# Patient Record
Sex: FEMALE | Race: WHITE | NOT HISPANIC OR LATINO | Employment: FULL TIME | URBAN - METROPOLITAN AREA
[De-identification: names, ages, dates, MRNs, and addresses within clinical notes are randomized per-mention and may not be internally consistent; named-entity substitution may affect disease eponyms.]

---

## 2019-01-18 ENCOUNTER — CLINICAL SUPPORT (OUTPATIENT)
Dept: URGENT CARE | Facility: CLINIC | Age: 49
End: 2019-01-18

## 2019-01-18 DIAGNOSIS — Z02.83 ENCOUNTER FOR DRUG SCREENING: Primary | ICD-10-CM

## 2019-01-18 PROCEDURE — 80305 PR COLLECTION ONLY DRUG SCREEN: ICD-10-PCS | Mod: S$GLB,,, | Performed by: EMERGENCY MEDICINE

## 2019-01-18 PROCEDURE — 80305 DRUG TEST PRSMV DIR OPT OBS: CPT | Mod: S$GLB,,, | Performed by: EMERGENCY MEDICINE

## 2019-01-25 ENCOUNTER — HOSPITAL ENCOUNTER (OUTPATIENT)
Dept: PSYCHIATRY | Facility: HOSPITAL | Age: 49
Discharge: HOME | End: 2019-01-25
Attending: PSYCHIATRY & NEUROLOGY
Payer: COMMERCIAL

## 2019-01-25 VITALS
DIASTOLIC BLOOD PRESSURE: 84 MMHG | BODY MASS INDEX: 31.34 KG/M2 | HEIGHT: 65 IN | WEIGHT: 188.13 LBS | TEMPERATURE: 98 F | HEART RATE: 88 BPM | SYSTOLIC BLOOD PRESSURE: 127 MMHG | RESPIRATION RATE: 16 BRPM

## 2019-01-25 DIAGNOSIS — F10.20 ALCOHOL USE DISORDER, SEVERE, DEPENDENCE: Primary | ICD-10-CM

## 2019-01-25 LAB
AMPHET+METHAMPHET UR QL: NEGATIVE
B-HCG UR QL: NEGATIVE
BARBITURATES UR QL SCN>200 NG/ML: NEGATIVE
BENZODIAZ UR QL SCN>200 NG/ML: NEGATIVE
BREATH ALCOHOL: 0
BZE UR QL SCN: NEGATIVE
CANNABINOIDS UR QL SCN: NEGATIVE
CREAT UR-MCNC: 38 MG/DL
ETHANOL UR-MCNC: <10 MG/DL
METHADONE UR QL SCN>300 NG/ML: NEGATIVE
OPIATES UR QL SCN: NEGATIVE
PCP UR QL SCN>25 NG/ML: NEGATIVE
TOXICOLOGY INFORMATION: NORMAL

## 2019-01-25 PROCEDURE — 80307 DRUG TEST PRSMV CHEM ANLYZR: CPT

## 2019-01-25 PROCEDURE — 99222 1ST HOSP IP/OBS MODERATE 55: CPT | Mod: ,,, | Performed by: PSYCHIATRY & NEUROLOGY

## 2019-01-25 PROCEDURE — 81025 URINE PREGNANCY TEST: CPT

## 2019-01-25 PROCEDURE — 90853 PR GROUP PSYCHOTHERAPY: ICD-10-PCS | Mod: ,,, | Performed by: PSYCHOLOGIST

## 2019-01-25 PROCEDURE — 90853 GROUP PSYCHOTHERAPY: CPT | Mod: ,,, | Performed by: PSYCHOLOGIST

## 2019-01-25 PROCEDURE — 90853 GROUP PSYCHOTHERAPY: CPT | Mod: 59,,, | Performed by: PSYCHOLOGIST

## 2019-01-25 PROCEDURE — 90853 PR GROUP PSYCHOTHERAPY: ICD-10-PCS | Mod: 59,,, | Performed by: PSYCHOLOGIST

## 2019-01-25 PROCEDURE — 90853 GROUP PSYCHOTHERAPY: CPT

## 2019-01-25 PROCEDURE — 90853 GROUP PSYCHOTHERAPY: CPT | Performed by: SOCIAL WORKER

## 2019-01-25 PROCEDURE — 99222 PR INITIAL HOSPITAL CARE,LEVL II: ICD-10-PCS | Mod: ,,, | Performed by: PSYCHIATRY & NEUROLOGY

## 2019-01-25 NOTE — PROGRESS NOTES
Group Psychotherapy (PhD/LCSW)    Site: [facility]    Clinical status of patient: Intensive Outpatient Program (IOP)    Date: 1/25/2019    Group Focus: Stress Management    Length of service: 31683 - 45-50 minutes    Number of patients in attendance: 10    Referred by: Addictive Behavior Unit Treatment Team    Target symptoms: Alcohol Abuse    Patient's response to treatment: Active Listening    Progress toward goals: Progressing adequately    Interval History: Group learned mindfulness techniques (breath, senses) to improve present-moment awareness, impulsive behavior tendencies, and tolerance of various emotional states.    Diagnosis: Alcohol Use Disorder    Plan: Continue treatment on ABU

## 2019-01-25 NOTE — PROGRESS NOTES
Group Psychotherapy (PhD/LCSW)    Site: [facility]    Clinical status of patient: Intensive Outpatient Program (IOP)    Date: 1/25/2019    Group Focus: The Dynamics of Relationship       Length of service: 38677 - 45-50 minutes    Number of patients in attendance: 6    Referred by: Addictive Behavior Unit Treatment Team    Target symptoms: Alcohol Abuse    Patient's response to treatment: Active Listening; Self-disclosure    Progress toward goals: Progressing adequately    Interval History: Discussed communication strategies for rebuilding trust with significant others.       Diagnosis: Alcohol Use Disorder, severe dependence     Plan: Continue treatment on ABU

## 2019-01-25 NOTE — PROGRESS NOTES
Group Psychotherapy (PhD/LCSW)    Site: [facility]    Clinical status of patient: Intensive Outpatient Program (IOP)    Date: 1/25/2019    Group Focus: Psychodynamic Group Therapy      Length of service: 80873 - 45-50 minutes    Number of patients in attendance: 6    Referred by: Addictive Behavior Unit Treatment Team    Target symptoms: Alcohol Abuse    Patient's response to treatment: Active Listening; Self-disclosure    Progress toward goals: Progressing adequately    Interval History: First day on the ABU program. Pt shared her story with the group.     Diagnosis: Alcohol Use Disorder, severe dependence     Plan: Continue treatment on ABU

## 2019-01-25 NOTE — PLAN OF CARE
01/25/19 1000   Activity/Group Therapy Checklist   Group Relapse Prevention   Attendance Attended   Follows Direction Followed directions   Group Interactions/Observations Interacted appropriately   Affect/Mood Range Normal range   Affect/Mood Display Appropriate   Goal Progression Progressing

## 2019-01-25 NOTE — H&P
1/25/2019 9:02 AM  Glo Flores  1970  [id number]    Addictive Behavior Unit Intensive Outpatient Program Admission History & Physical    STATUS:  Intensive Outpatient Program (IOP)    SUBJECTIVE:     History of Present Illness:   Glo Flores is a 48 y.o. female with a past psychiatric history of alcohol use, who presented to [facility] IOP due to same.     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 States that from age [age] had a partner that didn't drink etoh and neither did she, then their relationship ended. Mentions she was isolated during that time in her life from others. After that relationship ended, she made more social connections, developed a close group of friends, and her support system improved. She also notes that she began to have a glass of wine every so often, started going out with friends.     Prior to 2015, didn't drink except socially from age [age], about 3-4 glasses of wine a week, slowly increased over time.  At work in 2015, she experienced stress due to change in her supervisor. She felt her work performance remained the same, though in contrast to assessments with previous supervisor, she received negative evaluations. This was a large source of dissatisfaction and distress for her in the workplace.   Went on a cruise with her friends, met her current partner who drank heavily; per patient he has a problem with alcohol as well and has since stopped drinking.   Reports these two events correlated with when she started drinking more. 2 bottles of wine per night, though then noticed she would drink whatever was available.     She notes she began to experience dysfunction during that time, and friends told her she needed to stop drinking. Things came to a head when she was demoted from nursing educator in 2/2018; she was offered staff nurse position or severance package, went down to 32 hours a week but got a raise. Though states that she  "was "too far in" and started reporting to another supervisor.     End of July 2018 she was suspended from work due to issues with penmanship (due to tremulousness), missed work time and attendance; that day reported herself to the board of nursing. End of September went for her evaluation at [facility] addiction center and was recommended residential treatment. Started going to AA on [address] since beginning of September, then went to rehab 11/19/19.   Was able to stop drinking during interval from hospitalization to rehab admission by going to AA daily.     Was treated in the hospital with DTs and hypokalemia in 8/2018 when she tried to stop drinking, though had alcohol withdrawal, so presented to hospital tremulousness. Last drink 8/30/2018, was in hospital on morning of admission. Talks about being under PEC at the hospital, has vague memories of that time. Parents visited with her then, she reports her family is supportive.     Talks about a higher power during interview, and friends who offered her help; these are sources of strength.     Remarks that friends noticed that she was drinking more, isolating, tried to tell her to stop but she wouldn't listen. Impacted her worse performance, if she didn't feel like going she wouldn't. Wouldn't drink before work, though did call in sick once when she was intoxicated and went back to sleep. Had previously tried to stop drinking, though with withdrawal symptoms she would try to cut down and ended up drinking more again.     Did pay cash for rehab though not financial stressed presently.     Patient reports she completed program at [facility], believes she benefited from it greatly. Her partner stopped drinking the same day that she did, when she entered the hospital for withdrawal. He is also attending AA, though didn't go to rehab and doesn't yet have a sponsor; he is supportive.     Patient reports she experienced feeling anhedonic in the past, though this has " since resolved; she is on treatment with prozac that her ob/gyn started when she had perimenopausal symptoms and reports it has been well tolerated. Additionally started trazodone during rehab stay, helped her sleep well. Admits that she has dealt with worries about the next day, particularly when trying to sleep and some social anxiety though doesn't experience debilitating symptoms.       Substance Abuse History:  Substance of Choice: alcohol   Substances Used: none  History of IVDU?: No  Use of Alcohol: Yes - heavy  Average Consumption: 2 bottles of wine / day   Last Drink: 8/30/18  Tobacco: Yes - 1/2 PPD  History of Withdrawals: Yes - DTs in past 8/2018   History of Detox: Yes - in hospital 8/2018  Rehab History: [facility] in [place] re[facility], completed program until 1/16; 60 days   AA/NA: Has been attending daily, had in house and outside meetings at [facility], has a sponsor now at    Spouse/Partner Consumption: Yes - partner has the same sobriety date, they removed all alcohol from house and started going to meetings, though he doesn't have a sponsor yet   Patient Aware of Biomedical Complications: Yes    DSM-5 Substance Use Disorder Criteria:  1. Often take in larger amounts or over a longer period of time than was intended: Yes  2. Persistent desire or unsuccessful efforts to cut down or control use: Yes  3. Great deal of time spent in activities necessary to obtain substance, use, or recover from effects: Yes  4. Craving/strong desire for substance or urge to use: Yes  5. Use resulting in failure to fulfill major role obligations at home, work or school: Yes  6. Social, occupational, recreational activities decreased because of use: Yes  7. Continued use despite having persistent or recurrent social or interpersonal problems cause or exaserbated by the substance: Yes  8. Recurrent use in situations in which it is physically hazardous: Yes  9. Use despite physical or psychological problems that are  "likely to have been caused or exacerbated by the substance: Yes  10. Tolerance, as defined by either of the following: Yes   A. A need for markedly increased amounts of substance to achieve intoxication or desired effect. -OR-    B. A markedly diminished effect with continued use of the same amount of substance.  11. Withdrawal, as manifested by the following: Yes   A. The characteristic withdrawal syndrome for substance. -AND-   B. Substance is taken to relieve or avoid withdrawal symptoms.  Mild (1-3), Moderate (4-5), Severe (?6)    Psychiatric History:  Diagnoses: Depression, anxiety   Previous Medication Trials: yes, prozac 20mg po qdaily (3 years, prescribed by ob/gyn initially for perimenopausal symptoms ) and cymbalta (felt "loopy")  Previous Psychiatric Hospitalizations: no   Previous Suicide Attempts: no   History of Violence: no  Outpatient Psychiatrist: no  Had a therapist at [facility] in past. Had seen a therapist in past when she and her partner broke up, didn't see them for long, felt like she couldn't let relationship go and it helped somewhat.     Social History:  Marital Status: living with male partner   Children: 0    Employment Status: was on medical leave, that , now on short term disability, will bring paper work for long term disability   Education: college graduate  Special Ed: no   History: no  Housing Status: living with partner   Financial Status: secure, reports she has savings   Leisure/Recreation: just learned to M[employer]   Childhood History: parents alive, reports she had a good childhood, describes herself as an obedient child, good grades; one younger brother; close with her family   History of Physical/Sexual Abuse: no  Access to Gun: yes, recommend removing it from the home     Legal History:  Past Charges/Incarcerations: no   Pending Charges: no     Family Psychiatric History:   Maternal grandfather had alcohol use disorder (and his four siblings)  Maternal uncle " "had alcohol use disorder, both sober now  Maternal cousin with suicide attempt, alcohol and polysubstance use     Psychiatric Review Of Systems:  sleep: yes  appetite: no  weight: yes, gained 20 lbs   energy/anergy: yes  interest/pleasure/anhedonia: no  somatic symptoms: no  guilty/hopelessness: no  concentration: no  S.I.B.s/risky behavior: no  SI/SA:  no    anxiety/panic: no  Agoraphobia:  no  Social phobia: at times, though finds it manageable, does not endorse dysfunction as a result   Recurrent nightmares:  no  hyper startle response:  no  Avoidance: no  Recurrent thoughts:  no  Recurrent behaviors:  no    Irritability: no  Racing thoughts: no  Impulsive behaviors: no  Pressured speech:  no    Paranoia:no  Delusions: no  AVH:no    Medical Review Of Systems:  Negative    Collateral:   None obtained     Allergies:  Patient has no known allergies.  Medical/Surgical History:  HTN  Perimenopause   Peripheral neuropathy     Rx:   Metoprolol 12.5mg po qdaily  Prozac 20mg po qdaily  Trazodone 50mg po qhs prn for insomnia  Junel FE birth control pills   Alpha lipoic acid (dietary supplement, 1 month)   MVN     No past medical history on file.  No past surgical history on file.  OBJECTIVE:     Current Medications:  Infusions:    Scheduled:    PRN:    Home Medications:  Prior to Admission medications    Not on File     Vital Signs:  Vitals:    01/25/19 0830   BP: 127/84   Pulse: 88   Resp: 16   Temp: 98.3 °F (36.8 °C)     Physical Exam:    Neuro: regular gait, station    Mental Status Exam:  Appearance: unremarkable, age appropriate, casual clothing   Level of Consciousness: awake   Behavior/Cooperation: friendly and cooperative  Psychomotor: unremarkable   Speech: normal tone, normal rate, normal pitch, normal volume  Language: english, fluid  Orientation: person, place, situation, time/date  Attention Span/Concentration: spelled "WORLD" forwards and backwards  Memory: Registers and recalls 3/3 objects at 0 and 5 " "minutes  Mood: "happy"  Affect: mood congruent, reactive  Thought Process: linear, normal and logical  Associations: normal and logical  Thought Content: normal, no suicidality, no homicidality, delusions, or paranoia  Fund of Knowledge: Aware of current events  Abstraction: proverbs were abstract  Insight: good  Judgment: good    Labs/Imaging/Studies:   No results found for this or any previous visit (from the past 24 hour(s)).   ASSESSMENT     Glo Flores is a 48 y.o. female with a past psychiatric history of alcohol use disorder, who presented to ABU IOP due to same.     Ms. Flores has had significant dysfunction from alcohol use disorder causing occupational, social, and interpersonal dysfunction and failed attempts to maintain sobriety and complex withdrawal syndrome who has now completed residential rehab and would benefit from continued care in a supportive setting.     IMPRESSION  Alcohol use disorder, severe, in early remission   Hx of depression, unspecified, in remission   Hx OCPD    PLAN     · Start patient on ABU protocol.  · Breathalyzer and urine toxicology daily.  · VS daily x 3 days.  · CBC, CMP, GGT; will review patient records prior to reordering as she reports recently had labs at rehab   · Patient counseled on abstinence from alcohol.    Medications: Continue current medications.  · Trazodone 50mg po qdaily (since 11/2018) off label for insomnia   · Prozac 20mg po qdaily off label for perimenopausal symptoms per ob/gyn   · Metoprolol 12.5mg po qdaily for HTN    Status: Continue treatment on ABU    Twila Zaman MD  PGY II Psychiatry     "

## 2019-01-28 ENCOUNTER — OCCUPATIONAL HEALTH (OUTPATIENT)
Dept: URGENT CARE | Facility: CLINIC | Age: 49
End: 2019-01-28

## 2019-01-28 ENCOUNTER — HOSPITAL ENCOUNTER (OUTPATIENT)
Dept: PSYCHIATRY | Facility: HOSPITAL | Age: 49
Discharge: HOME | End: 2019-01-28
Attending: PSYCHIATRY & NEUROLOGY
Payer: COMMERCIAL

## 2019-01-28 VITALS — DIASTOLIC BLOOD PRESSURE: 62 MMHG | RESPIRATION RATE: 16 BRPM | HEART RATE: 82 BPM | SYSTOLIC BLOOD PRESSURE: 124 MMHG

## 2019-01-28 DIAGNOSIS — F10.20 ALCOHOL USE DISORDER, SEVERE, DEPENDENCE: Primary | ICD-10-CM

## 2019-01-28 DIAGNOSIS — Z76.89 ENCOUNTER FOR ASSESSMENT OF ALCOHOL AND DRUG USE: Primary | ICD-10-CM

## 2019-01-28 LAB
AMPHET+METHAMPHET UR QL: NEGATIVE
BARBITURATES UR QL SCN>200 NG/ML: NEGATIVE
BENZODIAZ UR QL SCN>200 NG/ML: NEGATIVE
BREATH ALCOHOL: 0
BZE UR QL SCN: NEGATIVE
CANNABINOIDS UR QL SCN: NEGATIVE
CREAT UR-MCNC: 28 MG/DL
ETHANOL UR-MCNC: <10 MG/DL
METHADONE UR QL SCN>300 NG/ML: NEGATIVE
OPIATES UR QL SCN: NEGATIVE
PCP UR QL SCN>25 NG/ML: NEGATIVE
TOXICOLOGY INFORMATION: NORMAL

## 2019-01-28 PROCEDURE — 90853 PR GROUP PSYCHOTHERAPY: ICD-10-PCS | Mod: 59,,, | Performed by: PSYCHOLOGIST

## 2019-01-28 PROCEDURE — 80305 DRUG TEST PRSMV DIR OPT OBS: CPT | Mod: S$GLB,,, | Performed by: EMERGENCY MEDICINE

## 2019-01-28 PROCEDURE — 80305 PR COLLECTION ONLY DRUG SCREEN: ICD-10-PCS | Mod: S$GLB,,, | Performed by: EMERGENCY MEDICINE

## 2019-01-28 PROCEDURE — 90853 GROUP PSYCHOTHERAPY: CPT | Performed by: SOCIAL WORKER

## 2019-01-28 PROCEDURE — 90853 GROUP PSYCHOTHERAPY: CPT

## 2019-01-28 PROCEDURE — 80307 DRUG TEST PRSMV CHEM ANLYZR: CPT

## 2019-01-28 PROCEDURE — 90853 GROUP PSYCHOTHERAPY: CPT | Mod: 59,,, | Performed by: PSYCHOLOGIST

## 2019-01-28 NOTE — TREATMENT PLAN
[facility]  ADDICTIVE BEHAVIOR UNIT  INTERDISCIPLINARY TREATMENT PLAN  INTENSIVE OUTPATIENT PROGRAM    INTERDISCIPLINARY  TREATMENT TEAM:    Nadira Stone M.D., Psychiatrist     Kia Victoria, Ph.D., Clinical Psychologist    Della Davis R.N., Registered Nurse    Salbador Live, [facility],     Krist[facility], [facility],     Kai Valdez, [facility],     Resident: Dr. Zaman          Signatures scanned into record separately.      ESTIMATED LOS:  4-6 weeks        The patient has reviewed the treatment plan with staff and has signed the Patient Responsibilities form.  Patient signature scanned into record separately        Dr. Thee Herron certifies that the patient would require inpatient psychiatric care if the Partial Hospitalization services were not provided, and services will be furnished under the care of a physician, and under a written Plan of Treatment.    Thee Herron M.D., Psychiatrist - Signature scanned into record separately.    TREATMENT PLAN    DIAGNOSIS: Alcohol Use Disorder, severe, in early remission; Depression             Patient/Family Education Needs/Barriers to Learning (i.e., Language, Reading, Comprehension): None       Support/Advocacy Services/Needs (i.e., Financial, Transportation, Medications): None       Community Resources (i.e., Alcoholics Anonymous, Al Anon, Cocaine Anonymous, Narcotics Anonymous): None           Strengths:  1. Intelligent   2. Good at problem solving   3. Ability to reflect/meditate        4. Willing for treatment         Limitations:  1. Not asking for help   2. Coping with depression    3. Work stress   4. Risk for relapse           Goals and Objectives:  1. Goal:  Abstain from alcohol and illicit drugs   Objective measure: Negative breathalyzer, negative urine screens   Time frame to reach goal: By discharge    2  Goal: Attend daily 12-step meetings   Objective measure: Signed attendance sheet daily   Time frame  to reach goal: Each day    3. Goal: Participate in group sessions    Objective measure: Progress notes indicating active listening, self-disclosure,   feedback   Time frame to reach goal: Each day    4. Goal: Obtain a 12-step sponsor   Objective measure: self-report   Time frame to reach goal: By discharge    5. Goal: Complete Life Story   Objective measure: Share story with group   Time frame to reach goal: Within first two weeks of treatment    6. Goal: Complete First Step   Objective measure: Share 1st step with group   Time frame to reach goal:  By discharge    7. Goal: Complete Relapse Prevention Plan   Objective measure: Share plan with group   Time frame to reach goal: By discharge    8.  Goal: Family involvement/participation   Objective measure: Family session documented in progress notes   Time frame to reach goal: By discharge    9. Goal:  Reduce depression   Objective measure: Physician progress note indicating depression is improved   Time frame to reach goal: By discharge    10.  Goal: Address licensing board issues   Objective measure: Contact with licensing board   Time frame to reach goal: By discharge              Group Interventions:  Psychodynamic Group Psychotherapy  1 hour, five times per week  Goals: 1. Utilize group empathy and support for problem solving; 2. Apply stress management, communication, and assertive skills to personal issues; 3. Discuss negative consequences of addictive behavior; 4. Discuss ways to change lifestyle to support sobriety; 5. Discuss addiction history    Addiction Education Group  1 hour, 2 times per week  Goals:  1. Verbalize increased knowledge of the process of recovery; 2. Understand basic concepts of addiction (denial, powerlessness, unmanageabiltiy, etc.); 3. Develop a consistent, positive image of self    Steps to Recovery Group  1 hour, 1 time per week  Goals:  1. Learn 12 steps; 2. Identify ways to incorporate 12 step principles into daily life; 3. Complete  first step; 4. Verbalize knowledge and understanding of the concept of a higher power    Living Sober Group  1 hour, 2 times per week  Goals:  1.  Reflect upon events of day/weekend, focusing on positive change; 2.  Discuss dynamics of 12 step meetings attended; 3. Discuss topics from book Living Sober     Stress Management Skills Group  1 hour, 3 times per week  Goals: 1. Identify types and levels of stress; 2. Identify and change maladaptive beliefs and behaviors; 3. Identify and practice techniques of stress management    Disease Concept Group  1 hour, 1 time per week  Goals: 1. Verbalize an understanding of the disease concept of addiction; 2. Increase familys understanding of the disease concept of addiction    Communication Skills Group  1 hour, 2 times per week  Goals: 1. Learn rules of effective communication; 2. Improve listening skills; 3. Practice clear communication    Promoting Healthy Lifestyles Group  1 hour, 1 time per week  Goals:  1. Understand the biopsychosocial model of health; 2. Develop insight into how substance abuse/dependency can impact dimensions of health; 3. Develop appropriate health promotion strategies    Relationship Dynamics Group  1 hour, 1 time per week  Goals:  1. Learn about factors that shape relationships; 2. Understand the central role of relationships in personal well-being; 3. Learn how to improve all relationships    Medical Complications Group  1 hour, 1 time per week  Goals:  1.  Increase knowledge of how addiction negatively affects the body; 2. Increase awareness of how abstinence can positively impact health

## 2019-01-28 NOTE — PLAN OF CARE
01/28/19 1000   Activity/Group Therapy Checklist   Group Educational  (stages of change )   Attendance Attended   Follows Direction Followed directions   Group Interactions/Observations Interacted appropriately   Affect/Mood Range Normal range   Affect/Mood Display Appropriate   Goal Progression Progressing

## 2019-01-28 NOTE — PROGRESS NOTES
"2019 5:28 PM  Name: Glo Flores  : 1970  Start Date: 19    [facility] Intensive Outpatient Program   Progress Note    Status: Intensive Outpatient Program (IOP)    HPI:  Glo Flores is a 48 y.o. female with a past psychiatric history of alcohol use, who presented to [facility] IOP due to same.      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 States that from age [age] had a partner that didn't drink etoh and neither did she, then their relationship ended. Mentions she was isolated during that time in her life from others. After that relationship ended, she made more social connections, developed a close group of friends, and her support system improved. She also notes that she began to have a glass of wine every so often, started going out with friends.      Prior to , didn't drink except socially from age [age], about 3-4 glasses of wine a week, slowly increased over time.  At work in , she experienced stress due to change in her supervisor. She felt her work performance remained the same, though in contrast to assessments with previous supervisor, she received negative evaluations. This was a large source of dissatisfaction and distress for her in the workplace.   Went on a cruise with her friends, met her current partner who drank heavily; per patient he has a problem with alcohol as well and has since stopped drinking.   Reports these two events correlated with when she started drinking more. 2 bottles of wine per night, though then noticed she would drink whatever was available.      She notes she began to experience dysfunction during that time, and friends told her she needed to stop drinking. 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 Though states that she was "too far in" and started reporting to " another supervisor.      End of July 2018 she was suspended from work due to issues with penmanship (due to tremulousness), missed work time and attendance; that day reported herself to the board of nursing. End of September went for her evaluation at [facility] addiction center and was recommended residential treatment. Started going to AA on [address] since beginning of September, then went to rehab 11/19/19.   Was able to stop drinking during interval from hospitalization to rehab admission by going to AA daily.      Was treated in the hospital with DTs and hypokalemia in 8/2018 when she tried to stop drinking, though had alcohol withdrawal, so presented to hospital tremulousness. Last drink 8/30/2018, was in hospital on morning of admission. Talks about being under PEC at the hospital, has vague memories of that time. Parents visited with her then, she reports her family is supportive.      Talks about a higher power during interview, and friends who offered her help; these are sources of strength.      Remarks that friends noticed that she was drinking more, isolating, tried to tell her to stop but she wouldn't listen. Impacted her worse performance, if she didn't feel like going she wouldn't. Wouldn't drink before work, though did call in sick once when she was intoxicated and went back to sleep. Had previously tried to stop drinking, though with withdrawal symptoms she would try to cut down and ended up drinking more again.      Did pay cash for rehab though not financial stressed presently.      Patient reports she completed program at [place], believes she benefited from it greatly. Her partner stopped drinking the same day that she did, when she entered the hospital for withdrawal. He is also attending AA, though didn't go to rehab and doesn't yet have a sponsor; he is supportive.      Patient reports she experienced feeling anhedonic in the past, though this has since resolved; she is on treatment  "with prozac that her ob/gyn started when she had perimenopausal symptoms and reports it has been well tolerated. Additionally started trazodone during rehab stay, helped her sleep well. Admits that she has dealt with worries about the next day, particularly when trying to sleep and some social anxiety though doesn't experience debilitating symptoms.    SUBJECTIVE:     Interval Hx:  1/28/19 Ms. Flores reports that she has been well, denies SE to medications or cravings. Continues to attend AA daily, changed group due to timing of IOP and notes that new group is particularly crowded though did not find experience off putting.     Toxicology Screen: 1/28 etoh breath test negative, etoh biomarkers in process, utox negative   Medication Side Effects: None  Cravings: no  No other acute psychiatric issues reported at this time.    Scheduled Meds:   No current outpatient medications on file prior to encounter.     No current facility-administered medications on file prior to encounter.      Allergies:  Patient has no known allergies.    Psychiatric Review Of Systems:  Denies    Medical ROS:  See H&P dated 1/25/19 for ROS.     OBJECTIVE:     Vital Signs (Most Recent):  Vitals:    01/28/19 1121   BP: 124/62   Pulse: 82   Resp: 16       Mental Status Exam:  Appearance: unremarkable, age appropriate, casual clothing   Level of Consciousness: awake   Behavior/Cooperation: friendly and cooperative  Psychomotor: unremarkable   Speech: normal tone, normal rate, normal pitch, normal volume  Language: english, fluid  Orientation: person, place, situation, time/date  Attention Span/Concentration: intact   Memory: intact to conversation  Mood: "good"  Affect: mood congruent, reactive  Thought Process: linear, normal and logical  Associations: normal and logical  Thought Content: normal, no suicidality, no homicidality, delusions, or paranoia  Fund of Knowledge: Aware of current events  Abstraction: proverbs were abstract on initial " assessment, did not re-assess   Insight: good  Judgment: good    Laboratory:  Recent Results (from the past 168 hour(s))   POCT BREATH ALCOHOL TEST    Collection Time: 01/25/19 11:07 AM   Result Value Ref Range    Breath Alcohol 0.000    Pregnancy, urine rapid    Collection Time: 01/25/19 11:07 AM   Result Value Ref Range    Preg Test, Ur Negative    Toxicology screen, urine    Collection Time: 01/25/19 11:07 AM   Result Value Ref Range    Alcohol, Urine <10 <10 mg/dL    Benzodiazepines Negative     Methadone metabolites Negative     Cocaine (Metab.) Negative     Opiate Scrn, Ur Negative     Barbiturate Screen, Ur Negative     Amphetamine Screen, Ur Negative     THC Negative     Phencyclidine Negative     Creatinine, Random Ur 38.0 15.0 - 325.0 mg/dL    Toxicology Information SEE COMMENT    Toxicology screen, urine    Collection Time: 01/28/19 11:21 AM   Result Value Ref Range    Alcohol, Urine <10 <10 mg/dL    Benzodiazepines Negative     Methadone metabolites Negative     Cocaine (Metab.) Negative     Opiate Scrn, Ur Negative     Barbiturate Screen, Ur Negative     Amphetamine Screen, Ur Negative     THC Negative     Phencyclidine Negative     Creatinine, Random Ur 28.0 15.0 - 325.0 mg/dL    Toxicology Information SEE COMMENT    POCT BREATH ALCOHOL TEST    Collection Time: 01/28/19 11:22 AM   Result Value Ref Range    Breath Alcohol 0.000      ASSESSMENT:     Glo Flores is a 48 y.o. female with a past psychiatric history of alcohol use disorder, [facility]      Ms. Flores has had significant dysfunction from alcohol use disorder causing occupational, social, and interpersonal dysfunction and failed attempts to maintain sobriety and complex withdrawal syndrome who has now completed residential rehab and would benefit from continued care in a supportive setting.      IMPRESSION  Alcohol use disorder, severe, in early remission   Hx of depression, unspecified, in remission   Hx  OCPD    PLAN:     · Start patient on ABU protocol.  · Breathalyzer and urine toxicology daily.  · VS daily x 3 days.  · CBC, CMP, GGT; will review patient records prior to reordering as she reports recently had labs at rehab   · Patient counseled on abstinence from alcohol.     Medications: Continue current medications.  · Trazodone 50mg po qdaily (since 11/2018) off label for insomnia   · Prozac 20mg po qdaily off label for perimenopausal symptoms per ob/gyn   · Metoprolol 12.5mg po qdaily for HTN     Status: Continue treatment on ABU     Twila Zaman MD  PGY II Psychiatry

## 2019-01-28 NOTE — PROGRESS NOTES
Group Psychotherapy (PhD/LCSW)    Site: [facility]    Clinical status of patient: Intensive Outpatient Program (IOP)    Date: 1/28/2019    Group Focus: Psychodynamic Group Therapy      Length of service: 16376 - 45-50 minutes    Number of patients in attendance: 7    Referred by: Addictive Behavior Unit Treatment Team    Target symptoms: Alcohol Abuse    Patient's response to treatment: Active Listening; Self-disclosure    Progress toward goals: Progressing adequately    Interval History: Pt shared her story with a new member of the group.     Diagnosis: Alcohol Use Disorder, severe dependence     Plan: Continue treatment on ABU

## 2019-01-28 NOTE — PROGRESS NOTES
Group Psychotherapy (PhD/LCSW)    Site: [facility]    Clinical status of patient: Intensive Outpatient Program (IOP)    Date: 1/28/2019    Group Focus: Communication Skills       Length of service: 85371 - 45-50 minutes    Number of patients in attendance: 11    Referred by: Addictive Behavior Unit Treatment Team    Target symptoms: Alcohol Abuse    Patient's response to treatment: Active Listening; Self-disclosure    Progress toward goals: Progressing adequately    Interval History: Discussed the value of I-messages for enhancing dialog, defining boundaries, and problem resolution. Illustrated how to formulate I-messages and gave examples of how to use them.      Diagnosis: Alcohol Use Disorder, severe dependence     Plan: Continue treatment on ABU

## 2019-01-29 ENCOUNTER — HOSPITAL ENCOUNTER (OUTPATIENT)
Dept: PSYCHIATRY | Facility: HOSPITAL | Age: 49
Discharge: HOME | End: 2019-01-29
Attending: PSYCHIATRY & NEUROLOGY
Payer: COMMERCIAL

## 2019-01-29 VITALS — RESPIRATION RATE: 16 BRPM | HEART RATE: 94 BPM | DIASTOLIC BLOOD PRESSURE: 66 MMHG | SYSTOLIC BLOOD PRESSURE: 121 MMHG

## 2019-01-29 DIAGNOSIS — F10.20 ALCOHOL USE DISORDER, SEVERE, DEPENDENCE: Primary | ICD-10-CM

## 2019-01-29 LAB — BREATH ALCOHOL: 0

## 2019-01-29 PROCEDURE — 90853 PR GROUP PSYCHOTHERAPY: ICD-10-PCS | Mod: ,,, | Performed by: PSYCHOLOGIST

## 2019-01-29 PROCEDURE — 90853 GROUP PSYCHOTHERAPY: CPT

## 2019-01-29 PROCEDURE — 90853 GROUP PSYCHOTHERAPY: CPT | Performed by: SOCIAL WORKER

## 2019-01-29 PROCEDURE — 90853 GROUP PSYCHOTHERAPY: CPT | Mod: ,,, | Performed by: PSYCHOLOGIST

## 2019-01-29 NOTE — PLAN OF CARE
01/29/19 1400   Activity/Group Therapy Checklist   Group Goals/Reflection   Attendance Attended   Follows Direction Followed directions   Group Interactions/Observations Interacted appropriately   Affect/Mood Range Normal range   Affect/Mood Display Appropriate   Goal Progression Progressing

## 2019-01-29 NOTE — PROGRESS NOTES
Group Psychotherapy (PhD/LCSW)    Site: [facility]    Clinical status of patient: Intensive Outpatient Program (IOP)    Date: 1/29/2019    Group Focus:  Disease Model of Addiction      Length of service: 77599 - 45-50 minutes    Number of patients in attendance: 9    Referred by: Addictive Behavior Unit Treatment Team    Target symptoms: Alcohol Abuse    Patient's response to treatment: Active Listening; Self-disclosure    Progress toward goals: Progressing adequately    Interval History:  Discussed the basic neuropsychological concepts of the Disease Model of Addiction and their relationship to the phenomena of addiction (powerlessness; euphoric recall; cravings; tolerance; relapse; etc). Noted the value of understanding the Disease Model for sustaining long-term sobriety.      Diagnosis: Alcohol Use Disorder, severe dependence     Plan: Continue treatment on ABU

## 2019-01-29 NOTE — PROGRESS NOTES
Group Psychotherapy (PhD/LCSW)    Site: [facility]    Clinical status of patient: Intensive Outpatient Program (IOP)    Date: 1/29/2019    Group Focus: Psychodynamic Group Psychotherapy    Length of service: 56770 - 45-50 minutes    Number of patients in attendance: 9    Referred by: Addictive Behavior Unit Treatment Team    Target symptoms: Alcohol Abuse    Patient's response to treatment: Active Listening and Self-disclosure    Progress toward goals: Progressing adequately    Interval History: Shared her story with the group.    Diagnosis: alcohol use disorder, severe, dependence    Plan: Continue treatment on ABU

## 2019-01-29 NOTE — PROGRESS NOTES
Group Psychotherapy (PhD/LCSW)    Site: [facility]    Clinical status of patient: Intensive Outpatient Program (IOP)    Date: 1/29/2019    Group Focus: ACT Group Therapy    Length of service: 56100 - 45-50 minutes    Number of patients in attendance: 12    Referred by: Addictive Behavior Unit Treatment Team    Target symptoms: Alcohol Abuse    Patient's response to treatment: Active Listening; Self-disclosure    Progress toward goals: Progressing adequately    Interval History: Session focus was Fusion and Defusion.  Patients were introduced to defusion, relational frame theory, and defusion exercises.    Diagnosis: Alcohol Use Disorder, severe dependence     Plan: Continue treatment on ABU

## 2019-01-30 ENCOUNTER — HOSPITAL ENCOUNTER (OUTPATIENT)
Dept: PSYCHIATRY | Facility: HOSPITAL | Age: 49
Discharge: HOME | End: 2019-01-30
Attending: PSYCHIATRY & NEUROLOGY
Payer: COMMERCIAL

## 2019-01-30 VITALS — DIASTOLIC BLOOD PRESSURE: 65 MMHG | SYSTOLIC BLOOD PRESSURE: 113 MMHG | HEART RATE: 85 BPM | RESPIRATION RATE: 16 BRPM

## 2019-01-30 DIAGNOSIS — F10.20 ALCOHOL USE DISORDER, SEVERE, DEPENDENCE: Primary | ICD-10-CM

## 2019-01-30 LAB
AMPHET+METHAMPHET UR QL: NEGATIVE
BARBITURATES UR QL SCN>200 NG/ML: NEGATIVE
BENZODIAZ UR QL SCN>200 NG/ML: NEGATIVE
BREATH ALCOHOL: 0
BZE UR QL SCN: NEGATIVE
CANNABINOIDS UR QL SCN: NEGATIVE
CREAT UR-MCNC: 87 MG/DL
ETHANOL UR-MCNC: <10 MG/DL
ETHYL GLUCURONIDE: NEGATIVE
METHADONE UR QL SCN>300 NG/ML: NEGATIVE
OPIATES UR QL SCN: NEGATIVE
PCP UR QL SCN>25 NG/ML: NEGATIVE
TOXICOLOGY INFORMATION: NORMAL

## 2019-01-30 PROCEDURE — 99232 PR SUBSEQUENT HOSPITAL CARE,LEVL II: ICD-10-PCS | Mod: ,,, | Performed by: PSYCHIATRY & NEUROLOGY

## 2019-01-30 PROCEDURE — 90853 GROUP PSYCHOTHERAPY: CPT | Mod: ,,, | Performed by: PSYCHIATRY & NEUROLOGY

## 2019-01-30 PROCEDURE — 80307 DRUG TEST PRSMV CHEM ANLYZR: CPT

## 2019-01-30 PROCEDURE — 90853 PR GROUP PSYCHOTHERAPY: ICD-10-PCS | Mod: ,,, | Performed by: PSYCHOLOGIST

## 2019-01-30 PROCEDURE — 90853 GROUP PSYCHOTHERAPY: CPT | Mod: ,,, | Performed by: PSYCHOLOGIST

## 2019-01-30 PROCEDURE — 90832 PSYTX W PT 30 MINUTES: CPT

## 2019-01-30 PROCEDURE — 90853 GROUP PSYCHOTHERAPY: CPT | Performed by: SOCIAL WORKER

## 2019-01-30 PROCEDURE — 99232 SBSQ HOSP IP/OBS MODERATE 35: CPT | Mod: ,,, | Performed by: PSYCHIATRY & NEUROLOGY

## 2019-01-30 PROCEDURE — 90853 PR GROUP PSYCHOTHERAPY: ICD-10-PCS | Mod: ,,, | Performed by: PSYCHIATRY & NEUROLOGY

## 2019-01-30 PROCEDURE — 90853 GROUP PSYCHOTHERAPY: CPT

## 2019-01-30 NOTE — PROGRESS NOTES
"Group Psychotherapy (MD)     Site: [facility]     Clinical status of patient: Intensive Outpatient Program (IOP)     Date: 1-30-19     Group Focus: Medical and Neuropsychiatric Bases of Psychiatric Disease and Addiction: anxiety     Length of service: 20445 - 45-50 minutes     Number of patients in attendance: 12     Referred by: Addictive Behavioral Unit Treatment Team     Target symptoms: Anxiety     Patient's response to treatment: Active Listening       Interval history: Discussed psychiatric symptoms of anxiety and neurochemical basis of anxiety, relating it to addiction and other psychiatric disorders. Provided rationale for use of various anxiety treatments, introduced concept of avoidance and discussed ways to gradually expose self to sources of anxiety. Explored responses to anxiety and helped patient separte those responses into either 'self care" or "self medication".      Progress towards goals: progressing adequately       Diagnosis: Alcohol Use Disorder Severe in early remission     Plan: Continue treatment on ABU             "

## 2019-01-30 NOTE — PROGRESS NOTES
Group Psychotherapy (PhD/LCSW)    Site: [facility]    Clinical status of patient: Intensive Outpatient Program (IOP)    Date: 1/30/2019    Group Focus: ACT Group Therapy    Length of service: 21669 - 45-50 minutes    Number of patients in attendance: 13    Referred by: Addictive Behavior Unit Treatment Team    Target symptoms: Alcohol Abuse    Patient's response to treatment: Active Listening; Self-disclosure    Progress toward goals: Progressing adequately    Interval History: Session focus was Fusion and Defusion.  Patients learned about snf words and snf conversations.  They were instructed to write and re-write their own snf conversations.    Diagnosis: Alcohol Use Disorder, severe dependence     Plan: Continue treatment on ABU

## 2019-01-30 NOTE — PROGRESS NOTES
Group Psychotherapy (PhD/LCSW)    Site: [facility]    Clinical status of patient: Intensive Outpatient Program (IOP)    Date: 1/30/2019    Group Focus: Psychodynamic Group Psychotherapy    Length of service: 07416 - 45-50 minutes    Number of patients in attendance: 9    Referred by: Addictive Behavior Unit Treatment Team    Target symptoms: Alcohol Abuse    Patient's response to treatment: Active Listening and Self-disclosure    Progress toward goals: Progressing adequately    Interval History: Discussed keeping family at arm's length due to mom's controlling nature.     Diagnosis: alcohol use disorder, severe, dependence    Plan: Continue treatment on ABU

## 2019-01-30 NOTE — PSYCH
"PRESENTING PROBLEM:    Date:  2019                  Time: 10:24 AM  Name: Glo Flores  Age: 48 y.o.             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 States that from age [age] had a partner that didn't drink etoh and neither did she, then their relationship ended. Mentions she was isolated during that time in her life from others. After that relationship ended, she made more social connections, developed a close group of friends, and her support system improved. She also notes that she began to have a glass of wine every so often, started going out with friends.      Prior to , didn't drink except socially from age [age], about 3-4 glasses of wine a week, slowly increased over time.  At work in , she experienced stress due to change in her supervisor. She felt her work performance remained the same, though in contrast to assessments with previous supervisor, she received negative evaluations. This was a large source of dissatisfaction and distress for her in the workplace.   Went on a cruise with her friends, met her current partner who drank heavily; per patient he has a problem with alcohol as well and has since stopped drinking.   Reports these two events correlated with when she started drinking more. 2 bottles of wine per night, though then noticed she would drink whatever was available.      She notes she began to experience dysfunction during that time, and friends told her she needed to stop drinking. 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 Though states that she was "too far in" and started reporting to another supervisor.      End of 2018 she was suspended from work due to issues with penmanship (due to tremulousness), missed " work time and attendance; that day reported herself to the board of nursing. End of September went for her evaluation at [facility] addiction center and was recommended residential treatment. Started going to AA on [address] since beginning of September, then went to rehab 11/19/19.   Was able to stop drinking during interval from hospitalization to rehab admission by going to AA daily.      Was treated in the hospital with DTs and hypokalemia in 8/2018 when she tried to stop drinking, though had alcohol withdrawal, so presented to hospital tremulousness. Last drink 8/30/2018, was in hospital on morning of admission. Talks about being under PEC at the hospital, has vague memories of that time. Parents visited with her then, she reports her family is supportive.      Talks about a higher power during interview, and friends who offered her help; these are sources of strength.      Remarks that friends noticed that she was drinking more, isolating, tried to tell her to stop but she wouldn't listen. Impacted her worse performance, if she didn't feel like going she wouldn't. Wouldn't drink before work, though did call in sick once when she was intoxicated and went back to sleep. Had previously tried to stop drinking, though with withdrawal symptoms she would try to cut down and ended up drinking more again.      Did pay cash for rehab though not financial stressed presently.      Patient reports she completed program at [facility], believes she benefited from it greatly. Her partner stopped drinking the same day that she did, when she entered the hospital for withdrawal. He is also attending AA, though didn't go to rehab and doesn't yet have a sponsor; he is supportive.      Patient reports she experienced feeling anhedonic in the past, though this has since resolved; she is on treatment with prozac that her ob/gyn started when she had perimenopausal symptoms and reports it has been well tolerated. Additionally started  "trazodone during rehab stay, helped her sleep well. Admits that she has dealt with worries about the next day, particularly when trying to sleep and some social anxiety though doesn't experience debilitating symptoms.      Consequences of Use (Explain):Family and Occupational  Biomedical complication of use: anxiety, shakes  Motivation for Change (Explain): Yes  Needed Skills to Achieve Goals: "My nursing license. I want to continue the work that I started there([facility])"    CHILDHOOD and FAMILY HISTORY    Issue or Concerns Related to Childhood: parents alive, reports she had a good childhood,   Childhood/Adolescent Behavior Problems: describes herself as an obedient child, good grades  Family History:   - Father (name and age): Renae" age 70   - Paternal History of Substance Abuse/Mental Health: Denies   - Mother (name and age): Vira age 68   - Maternal History of Substance Abuse/Mental Health: Maternal grandfather had alcohol use disorder (and his four siblings)  Maternal uncle had alcohol use disorder, both sober now  Maternal cousin with suicide attempt, alcohol and polysubstance use    - Siblings (name[s] and age[s]): Blas age 46   -Siblings Substance Abuse/Mental Health: drinks socially  Relationship with family members: "good""very supportive"  Marital Status: Lives with partner pt met on cruise.   Relationship History: Ended a 24 year relationship apprx age 40. Did not   Children: none   -Substance Abuse/Mental Health Concerns: n/a   -Relationship with children: n/a  Living Situation: Living [facility] with significant other dating x 3 years.  Support System: "My family, significant other, AA friends"  Psychosocial Stressors related to interpersonal relationships: "I had some separation and isolation, self inflicted "- friends    EDUCATION and OCCUPATIONAL    Education Level: College  Occupation Status: on leave through [employer]  Previous/Current Occupation: OR nurse  Financial " "Status: secure  Psychosocial Stressors related to work or finances: Needs to complete recovery program to regain certification    MENTAL HEALTH AND SUBSTANCE ABUSE    Psychiatric History/Previous Substance Abuse: Evaluation at [facility] for evaluation through board. [facility] x 60 days. Currently in [facility] with monitoring for 5 years.   Substance Abuse History:Substance of Choice: alcohol   Substances Used: none  History of IVDU?: No  Use of Alcohol: Yes - heavy  Average Consumption: 2 bottles of wine / day   Last Drink: 8/30/18  Tobacco: Yes - 1/2 PPD  History of Withdrawals: Yes - DTs in past 8/2018   History of Detox: Yes - in hospital 8/2018  Rehab History: [facility] in [place] rehab, completed program until 1/16; 60 days   AA/NA: Has been attending daily, had in house and outside meetings at [facility], has a sponsor now at    Spouse/Partner Consumption: Yes - partner has the same sobriety date, they removed all alcohol from house and started going to meetings, though he doesn't have a sponsor yet       Other Addictive Behaviors: Denies  History of Detoxification Treatment/ Residential Treatment Facility:Detox in a hospital at [facility]  History of Inpatient Psychiatric Care: denies  HIV/AIDS/Sexually Transmitted Disease Risk (unsafe sex/needle sharing): Denies  Suicidal/Homicidal Ideation and/or Plan (Explain): Denies SI. Denies HI   Current Risk: low  Psychosocial Stressors related to mental health or substance abuse: Discussed increase in use of ETOH after end of 24 year relationship.     OTHER RELATED HISTORY    Current Health Concerns: Reports HTN under control and taking trazodone for sleep  Physical/Emotional/Sexual Abuse: Denies  Trauma History: [place], had a long term relationship that ended after 24 years.    History: No  Yarsanism/Spiritual Orientation: Raised Zoroastrian  Spiritual impact on treatment: "The universe" as higher power  Current/Past Legal History: " "Denies   -Probation/: N/A  Access To Guns: Yes   -Secured: Recommended by MD to remove from home  Psychosocial Stressors related to other health history: Reports current health issues under control. Reports motivation to discontinue trazodone for sleep.     No past medical history on file.    No past surgical history on file.    No family history on file.    Social History     Socioeconomic History    Marital status: Single     Spouse name: Not on file    Number of children: Not on file    Years of education: Not on file    Highest education level: Not on file   Social Needs    Financial resource strain: Not on file    Food insecurity - worry: Not on file    Food insecurity - inability: Not on file    Transportation needs - medical: Not on file    Transportation needs - non-medical: Not on file   Occupational History    Not on file   Tobacco Use    Smoking status: Not on file   Substance and Sexual Activity    Alcohol use: Not on file    Drug use: Not on file    Sexual activity: Not on file   Other Topics Concern    Not on file   Social History Narrative    Not on file       No current outpatient medications on file.     No current facility-administered medications for this encounter.        Review of patient's allergies indicates:  No Known Allergies    DIAGNOSIS AND IMPRESSIONS    Diagnosis: Alcohol use disorder, severe, in early remission   Hx of depression, unspecified, in remission   Hx OCPD    Baseline: " Generally happy, content, outgoing person."  Impressions: talkative and needed some redirection. Friendly and open.    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 States that from age [age] had a partner that didn't drink etoh and neither did she, then their relationship ended. Mentions she was isolated during that time in her life from others. After that relationship ended, she made more social connections, developed a close " "group of friends, and her support system improved. She also notes that she began to have a glass of wine every so often, started going out with friends.      Prior to 2015, didn't drink except socially from age [age], about 3-4 glasses of wine a week, slowly increased over time.  At work in 2015, she experienced stress due to change in her supervisor. She felt her work performance remained the same, though in contrast to assessments with previous supervisor, she received negative evaluations. This was a large source of dissatisfaction and distress for her in the workplace.   Went on a cruise with her friends, met her current partner who drank heavily; per patient he has a problem with alcohol as well and has since stopped drinking.   Reports these two events correlated with when she started drinking more. 2 bottles of wine per night, though then noticed she would drink whatever was available.      She notes she began to experience dysfunction during that time, and friends told her she needed to stop drinking. Things came to a head when she was demoted from nursing educator in 2/2018; she was offered staff nurse position or severance package, went down to 32 hours a week but got a raise. Though states that she was "too far in" and started reporting to another supervisor.      End of July 2018 she was suspended from work due to issues with penmanship (due to tremulousness), missed work time and attendance; that day reported herself to the board of nursing. End of September went for her evaluation at [facility] addiction center and was recommended residential treatment. Started going to AA on [address] since beginning of September, then went to rehab 11/19/19.   Was able to stop drinking during interval from hospitalization to rehab admission by going to AA daily.      Was treated in the hospital with DTs and hypokalemia in 8/2018 when she tried to stop drinking, though had alcohol withdrawal, so presented to " hospital tremulousness. Last drink 8/30/2018, was in hospital on morning of admission. Talks about being under PEC at the hospital, has vague memories of that time. Parents visited with her then, she reports her family is supportive.      Talks about a higher power during interview, and friends who offered her help; these are sources of strength.      Remarks that friends noticed that she was drinking more, isolating, tried to tell her to stop but she wouldn't listen. Impacted her worse performance, if she didn't feel like going she wouldn't. Wouldn't drink before work, though did call in sick once when she was intoxicated and went back to sleep. Had previously tried to stop drinking, though with withdrawal symptoms she would try to cut down and ended up drinking more again.      Did pay cash for rehab though not financial stressed presently.      Patient reports she completed program at [facility], believes she benefited from it greatly. Her partner stopped drinking the same day that she did, when she entered the hospital for withdrawal. He is also attending AA, though didn't go to rehab and doesn't yet have a sponsor; he is supportive.      Patient reports she experienced feeling anhedonic in the past, though this has since resolved; she is on treatment with prozac that her ob/gyn started when she had perimenopausal symptoms and reports it has been well tolerated. Additionally started trazodone during rehab stay, helped her sleep well. Admits that she has dealt with worries about the next day, particularly when trying to sleep and some social anxiety though doesn't experience debilitating symptoms.

## 2019-01-30 NOTE — PROGRESS NOTES
"2019 5:28 PM  Name: Glo Flores  : 1970  Start Date: 19    ABU Intensive Outpatient Program   Progress Note    Status: Intensive Outpatient Program (IOP)    HPI:  Glo Flores is a 48 y.o. female with a past psychiatric history of alcohol use, who presented to ABU IOP due to same.      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 States that from age [age] had a partner that didn't drink etoh and neither did she, then their relationship ended. Mentions she was isolated during that time in her life from others. After that relationship ended, she made more social connections, developed a close group of friends, and her support system improved. She also notes that she began to have a glass of wine every so often, started going out with friends.      Prior to , didn't drink except socially from age [age], about 3-4 glasses of wine a week, slowly increased over time.  At work in , she experienced stress due to change in her supervisor. She felt her work performance remained the same, though in contrast to assessments with previous supervisor, she received negative evaluations. This was a large source of dissatisfaction and distress for her in the workplace.   Went on a cruise with her friends, met her current partner who drank heavily; per patient he has a problem with alcohol as well and has since stopped drinking.   Reports these two events correlated with when she started drinking more. 2 bottles of wine per night, though then noticed she would drink whatever was available.      She notes she began to experience dysfunction during that time, and friends told her she needed to stop drinking. 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 Though states that she was "too far in" and started reporting to " another supervisor.      End of July 2018 she was suspended from work due to issues with penmanship (due to tremulousness), missed work time and attendance; that day reported herself to the board of nursing. End of September went for her evaluation at [facility] addiction center and was recommended residential treatment. Started going to AA on [address] since beginning of September, then went to rehab 11/19/19.   Was able to stop drinking during interval from hospitalization to rehab admission by going to AA daily.      Was treated in the hospital with DTs and hypokalemia in 8/2018 when she tried to stop drinking, though had alcohol withdrawal, so presented to hospital tremulousness. Last drink 8/30/2018, was in hospital on morning of admission. Talks about being under PEC at the hospital, has vague memories of that time. Parents visited with her then, she reports her family is supportive.      Talks about a higher power during interview, and friends who offered her help; these are sources of strength.      Remarks that friends noticed that she was drinking more, isolating, tried to tell her to stop but she wouldn't listen. Impacted her worse performance, if she didn't feel like going she wouldn't. Wouldn't drink before work, though did call in sick once when she was intoxicated and went back to sleep. Had previously tried to stop drinking, though with withdrawal symptoms she would try to cut down and ended up drinking more again.      Did pay cash for rehab though not financial stressed presently.      Patient reports she completed program at [facility], believes she benefited from it greatly. Her partner stopped drinking the same day that she did, when she entered the hospital for withdrawal. He is also attending AA, though didn't go to rehab and doesn't yet have a sponsor; he is supportive.      Patient reports she experienced feeling anhedonic in the past, though this has since resolved; she is on treatment  with prozac that her ob/gyn started when she had perimenopausal symptoms and reports it has been well tolerated. Additionally started trazodone during rehab stay, helped her sleep well. Admits that she has dealt with worries about the next day, particularly when trying to sleep and some social anxiety though doesn't experience debilitating symptoms.    SUBJECTIVE:     Interval Hx:  1/28/19 Ms. Flores reports that she has been well, denies SE to medications or cravings. Continues to attend AA daily, changed group due to timing of IOP and notes that new group is particularly crowded though did not find experience off putting.     Toxicology Screen: 1/28 etoh breath test negative, etoh biomarkers in process, utox negative   Medication Side Effects: None  Cravings: no  No other acute psychiatric issues reported at this time.    1/30/19 Reports she has been well, AA daily, no SE to medications. No cravings reported.     Toxicology Screen: 1/28 etoh breath test negative, etoh biomarkers negative, utox negative; 1/30 breath test negative, utox in process   Medication Side Effects: None  Cravings: no  No other acute psychiatric issues reported at this time.    Scheduled Meds:   No current outpatient medications on file prior to encounter.     No current facility-administered medications on file prior to encounter.      Allergies:  Patient has no known allergies.    Psychiatric Review Of Systems:  Denies    Medical ROS:  See H&P dated 1/25/19 for ROS.     OBJECTIVE:     Vital Signs (Most Recent):  Vitals:    01/30/19 1115   BP: 113/65   Pulse: 85   Resp: 16       Mental Status Exam:  Appearance: unremarkable, age appropriate, casual clothing   Level of Consciousness: awake   Behavior/Cooperation: friendly and cooperative  Psychomotor: unremarkable   Speech: normal tone, normal rate, normal pitch, normal volume  Language: english, fluid  Orientation: person, place, situation, time/date  Attention Span/Concentration: intact  "  Memory: intact to conversation  Mood: "good"  Affect: mood congruent, reactive  Thought Process: linear, normal and logical  Associations: normal and logical  Thought Content: normal, no suicidality, no homicidality, delusions, or paranoia  Fund of Knowledge: Aware of current events  Abstraction: proverbs were abstract on initial assessment, did not re-assess   Insight: good  Judgment: good    Laboratory:  Recent Results (from the past 168 hour(s))   POCT BREATH ALCOHOL TEST    Collection Time: 01/25/19 11:07 AM   Result Value Ref Range    Breath Alcohol 0.000    Pregnancy, urine rapid    Collection Time: 01/25/19 11:07 AM   Result Value Ref Range    Preg Test, Ur Negative    Toxicology screen, urine    Collection Time: 01/25/19 11:07 AM   Result Value Ref Range    Alcohol, Urine <10 <10 mg/dL    Benzodiazepines Negative     Methadone metabolites Negative     Cocaine (Metab.) Negative     Opiate Scrn, Ur Negative     Barbiturate Screen, Ur Negative     Amphetamine Screen, Ur Negative     THC Negative     Phencyclidine Negative     Creatinine, Random Ur 38.0 15.0 - 325.0 mg/dL    Toxicology Information SEE COMMENT    Toxicology screen, urine    Collection Time: 01/28/19 11:21 AM   Result Value Ref Range    Alcohol, Urine <10 <10 mg/dL    Benzodiazepines Negative     Methadone metabolites Negative     Cocaine (Metab.) Negative     Opiate Scrn, Ur Negative     Barbiturate Screen, Ur Negative     Amphetamine Screen, Ur Negative     THC Negative     Phencyclidine Negative     Creatinine, Random Ur 28.0 15.0 - 325.0 mg/dL    Toxicology Information SEE COMMENT    Alcohol Biomarkers, urine    Collection Time: 01/28/19 11:21 AM   Result Value Ref Range    Ethyl Glucuronide NEGATIVE    POCT BREATH ALCOHOL TEST    Collection Time: 01/28/19 11:22 AM   Result Value Ref Range    Breath Alcohol 0.000    POCT BREATH ALCOHOL TEST    Collection Time: 01/29/19 11:46 AM   Result Value Ref Range    Breath Alcohol 0.000    POCT BREATH " ALCOHOL TEST    Collection Time: 01/30/19 11:16 AM   Result Value Ref Range    Breath Alcohol 0.000      ASSESSMENT:     Glo Flores is a 48 y.o. female with a past psychiatric history of alcohol use disorder, who presented to ABU IOP due to same.      Ms. Flores has had significant dysfunction from alcohol use disorder causing occupational, social, and interpersonal dysfunction and failed attempts to maintain sobriety and complex withdrawal syndrome who has now completed residential rehab and would benefit from continued care in a supportive setting.      IMPRESSION  Alcohol use disorder, severe, in early remission   Hx of depression, unspecified, in remission   Hx OCPD    PLAN:     · Start patient on ABU protocol.  · Breathalyzer and urine toxicology daily.  · VS daily x 3 days.  · CBC, CMP, GGT; will review patient records prior to reordering as she reports recently had labs at rehab, requested labs from outside facility, awaiting fax   · Patient counseled on abstinence from alcohol.     Medications: Continue current medications.  · Trazodone 50mg po qdaily (since 11/2018) off label for insomnia   · Prozac 20mg po qdaily off label for perimenopausal symptoms per ob/gyn   · Metoprolol 12.5mg po qdaily for HTN     Status: Continue treatment on ABU     Twila Zaman MD  PGY II Psychiatry

## 2019-01-30 NOTE — PATIENT CARE CONFERENCE
ABU Staffing:   Alcohol use Disorder, Severe, in early remission  Hx of depression, unspecified in remission  Hx [employer]          1. Pt is attending all groups    2. Pt is attending all meetings  3. Pt 's has minimally supportive family  4. Pt has completed spiritual assessment    5. Pt will present life story    6. Pt will present Step One assignment    7. Pt is exploring issues related to relapse  prevention; spirituality; stress management; improved communication skills; assertiveness training; poor self-esteem; disease concepts; cross addictions; and, work related issues    8. D/C date:TBD     Staff discussed pt's enrollment in ABU program after completing residential tx for ETOH use. Staffing discussed the RNP board monitoring pt as well as pt losing job after staff reporting pt to HR and pt self disclosing use to work. Staffing discussed pt's mental health concerns, strange affect, and motivation for tx.     Problem: Alcohol use Disorder, Severe, in early remission  Goal: Address in 12 step meetings and group and individual sessions    Objective Measure: participation in groups, self report, length of sobriety, and relapse prevention plan  Time: Prior to discharge    Progress: Pt is attending groups and sessions       Problem: Hx of depression, unspecified in remission  Goal: Address in 12 step meetings and group and individual sessions    Objective Measure: participation in groups, self report, length of sobriety, and relapse prevention plan  Time: Prior to discharge    Progress: Pt is attending groups and sessions       Problem: Hx [employer]  Goal: Address in 12 step meetings and group and individual sessions    Objective Measure: participation in groups, self report, length of sobriety, and relapse prevention plan  Time: Prior to discharge    Progress: Pt is attending groups and sessions           Staff members present:    MD Jb Resident  Danyell Alonso.SALAS Live, [facility]  Joyce Sanchez, [facility]  Courtney, CHERW  Della Davis RN

## 2019-01-30 NOTE — PLAN OF CARE
01/30/19 1400   Activity/Group Therapy Checklist   Group Educational  (denial )   Attendance Attended   Follows Direction Followed directions   Group Interactions/Observations Interacted appropriately   Affect/Mood Range Normal range   Affect/Mood Display Appropriate   Goal Progression Progressing

## 2019-01-31 ENCOUNTER — HOSPITAL ENCOUNTER (OUTPATIENT)
Dept: PSYCHIATRY | Facility: HOSPITAL | Age: 49
Discharge: HOME | End: 2019-01-31
Attending: PSYCHIATRY & NEUROLOGY
Payer: COMMERCIAL

## 2019-01-31 DIAGNOSIS — F10.20 ALCOHOL USE DISORDER, SEVERE, DEPENDENCE: Primary | ICD-10-CM

## 2019-01-31 LAB — BREATH ALCOHOL: 0

## 2019-01-31 PROCEDURE — 90853 GROUP PSYCHOTHERAPY: CPT

## 2019-01-31 PROCEDURE — 90853 GROUP PSYCHOTHERAPY: CPT | Mod: ,,, | Performed by: PSYCHOLOGIST

## 2019-01-31 PROCEDURE — 90853 GROUP PSYCHOTHERAPY: CPT | Performed by: SOCIAL WORKER

## 2019-01-31 PROCEDURE — 90853 PR GROUP PSYCHOTHERAPY: ICD-10-PCS | Mod: ,,, | Performed by: PSYCHOLOGIST

## 2019-01-31 NOTE — PROGRESS NOTES
Group Psychotherapy (PhD/LCSW)    Site: [facility]    Clinical status of patient: Intensive Outpatient Program (IOP)    Date: 1/31/2019    Group Focus: CBT Group Therapy    Length of service: 46113 - 45-50 minutes    Number of patients in attendance: 12    Referred by: Addictive Behavior Unit Treatment Team    Target symptoms: Alcohol Abuse    Patient's response to treatment: Active Listening; Self-disclosure    Progress toward goals: Progressing adequately    Interval History: Session focus was Cognitive Restructuring:  Identifying unhelpful and helpful thoughts.  Patients were provided with a worksheet on unhelpful thinking styles and how to identify helpful thoughts.  Patient identified wanting to work on the unhelpful thinking style(s) of all-or-nothing.     Diagnosis: Alcohol Use Disorder, severe dependence     Plan: Continue treatment on ABU

## 2019-01-31 NOTE — PLAN OF CARE
01/31/19 1000   Activity/Group Therapy Checklist   Group Relapse Prevention   Attendance Attended   Follows Direction Followed directions   Group Interactions/Observations Interacted appropriately   Affect/Mood Range Normal range   Affect/Mood Display Appropriate   Goal Progression Progressing

## 2019-01-31 NOTE — PLAN OF CARE
01/31/19 1400   Activity/Group Therapy Checklist   Group Relapse Prevention  (Step Work )   Attendance Attended   Follows Direction Followed directions   Group Interactions/Observations Interacted appropriately   Affect/Mood Range Normal range   Affect/Mood Display Appropriate   Goal Progression Progressing

## 2019-01-31 NOTE — PROGRESS NOTES
Group Psychotherapy (PhD/LCSW)    Site: [facility]    Clinical status of patient: Intensive Outpatient Program (IOP)    Date: 1/31/2019    Group Focus: Psychodynamic Group Psychotherapy    Length of service: 45762 - 45-50 minutes    Number of patients in attendance: 9    Referred by: Addictive Behavior Unit Treatment Team    Target symptoms: Alcohol Abuse    Patient's response to treatment: Active Listening and Self-disclosure    Progress toward goals: Progressing adequately    Interval History: Discussed how she got her sponsor.    Diagnosis: alcohol use disorder, severe, dependence    Plan: Continue treatment on ABU

## 2019-02-01 ENCOUNTER — OCCUPATIONAL HEALTH (OUTPATIENT)
Dept: URGENT CARE | Facility: CLINIC | Age: 49
End: 2019-02-01

## 2019-02-01 ENCOUNTER — HOSPITAL ENCOUNTER (OUTPATIENT)
Dept: PSYCHIATRY | Facility: HOSPITAL | Age: 49
Discharge: HOME | End: 2019-02-01
Attending: PSYCHIATRY & NEUROLOGY
Payer: MEDICAID

## 2019-02-01 VITALS — DIASTOLIC BLOOD PRESSURE: 74 MMHG | RESPIRATION RATE: 16 BRPM | HEART RATE: 90 BPM | SYSTOLIC BLOOD PRESSURE: 125 MMHG

## 2019-02-01 DIAGNOSIS — Z02.83 ENCOUNTER FOR DRUG SCREENING: Primary | ICD-10-CM

## 2019-02-01 DIAGNOSIS — F10.20 ALCOHOL USE DISORDER, SEVERE, DEPENDENCE: Primary | ICD-10-CM

## 2019-02-01 LAB
AMPHET+METHAMPHET UR QL: NEGATIVE
BARBITURATES UR QL SCN>200 NG/ML: NEGATIVE
BENZODIAZ UR QL SCN>200 NG/ML: NEGATIVE
BREATH ALCOHOL: 0
BZE UR QL SCN: NEGATIVE
CANNABINOIDS UR QL SCN: NEGATIVE
CREAT UR-MCNC: 55 MG/DL
ETHANOL UR-MCNC: <10 MG/DL
METHADONE UR QL SCN>300 NG/ML: NEGATIVE
OPIATES UR QL SCN: NEGATIVE
PCP UR QL SCN>25 NG/ML: NEGATIVE
TOXICOLOGY INFORMATION: NORMAL

## 2019-02-01 PROCEDURE — 99232 SBSQ HOSP IP/OBS MODERATE 35: CPT | Mod: AF,HB,, | Performed by: PSYCHIATRY & NEUROLOGY

## 2019-02-01 PROCEDURE — 80307 DRUG TEST PRSMV CHEM ANLYZR: CPT

## 2019-02-01 PROCEDURE — 80305 DRUG TEST PRSMV DIR OPT OBS: CPT | Mod: S$GLB,,, | Performed by: EMERGENCY MEDICINE

## 2019-02-01 PROCEDURE — 99232 PR SUBSEQUENT HOSPITAL CARE,LEVL II: ICD-10-PCS | Mod: AF,HB,, | Performed by: PSYCHIATRY & NEUROLOGY

## 2019-02-01 PROCEDURE — 90853 GROUP PSYCHOTHERAPY: CPT | Mod: HP,HB,, | Performed by: PSYCHOLOGIST

## 2019-02-01 PROCEDURE — 80305 PR COLLECTION ONLY DRUG SCREEN: ICD-10-PCS | Mod: S$GLB,,, | Performed by: EMERGENCY MEDICINE

## 2019-02-01 PROCEDURE — 90853 GROUP PSYCHOTHERAPY: CPT

## 2019-02-01 PROCEDURE — 90853 PR GROUP PSYCHOTHERAPY: ICD-10-PCS | Mod: HP,HB,, | Performed by: PSYCHOLOGIST

## 2019-02-01 PROCEDURE — 90853 GROUP PSYCHOTHERAPY: CPT | Performed by: SOCIAL WORKER

## 2019-02-01 NOTE — PROGRESS NOTES
"Group Psychotherapy (PhD/LCSW)    Site: [facility]    Clinical status of patient: Intensive Outpatient Program (IOP)    Date: 2/1/2019    Group Focus: Psychodynamic Group Psychotherapy    Length of service: 59103 - 45-50 minutes    Number of patients in attendance: 9    Referred by: Addictive Behavior Unit Treatment Team    Target symptoms: Alcohol Abuse    Patient's response to treatment: Active Listening and Self-disclosure    Progress toward goals: Progressing adequately    Interval History: Discussed the "pink cloud" phenomenon. Also addressed the importance of self-care and putting Recovery first.    Diagnosis: alcohol use disorder, severe, dependence    Plan: Continue treatment on ABU        "

## 2019-02-01 NOTE — PROGRESS NOTES
Subjective:       Patient ID: Glo Flores is a 48 y.o. female.    Chief Complaint: Drug Screen-[facility]    [facility] urine drug screen collection completed by [facility]    Objective:      Physical Exam    Assessment:       No diagnosis found.    Plan:                   No Follow-up on file.

## 2019-02-01 NOTE — PROGRESS NOTES
"2019 5:28 PM  Name: Glo Flores  : 1970  Start Date: 19    ABU Intensive Outpatient Program   Progress Note    Status: Intensive Outpatient Program (IOP)    HPI:  Glo Flores is a 48 y.o. female with a past psychiatric history of alcohol use, who presented to ABU IOP due to same.      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 States that from age [age] had a partner that didn't drink etoh and neither did she, then their relationship ended. Mentions she was isolated during that time in her life from others. After that relationship ended, she made more social connections, developed a close group of friends, and her support system improved. She also notes that she began to have a glass of wine every so often, started going out with friends.      Prior to , didn't drink except socially from age [age], about 3-4 glasses of wine a week, slowly increased over time.  At work in , she experienced stress due to change in her supervisor. She felt her work performance remained the same, though in contrast to assessments with previous supervisor, she received negative evaluations. This was a large source of dissatisfaction and distress for her in the workplace.   Went on a cruise with her friends, met her current partner who drank heavily; per patient he has a problem with alcohol as well and has since stopped drinking.   Reports these two events correlated with when she started drinking more. 2 bottles of wine per night, though then noticed she would drink whatever was available.      She notes she began to experience dysfunction during that time, and friends told her she needed to stop drinking. 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 Though states that she was "too far in" and started reporting to " another supervisor.      End of July 2018 she was suspended from work due to issues with penmanship (due to tremulousness), missed work time and attendance; that day reported herself to the board of nursing. End of September went for her evaluation at [facility] addiction [facility] and was recommended residential treatment. Started going to AA on [address] since beginning of September, then went to rehab 11/19/19.   Was able to stop drinking during interval from hospitalization to rehab admission by going to AA daily.      Was treated in the hospital with DTs and hypokalemia in 8/2018 when she tried to stop drinking, though had alcohol withdrawal, so presented to hospital tremulousness. Last drink 8/30/2018, was in hospital on morning of admission. Talks about being under PEC at the hospital, has vague memories of that time. Parents visited with her then, she reports her family is supportive.      Talks about a higher power during interview, and friends who offered her help; these are sources of strength.      Remarks that friends noticed that she was drinking more, isolating, tried to tell her to stop but she wouldn't listen. Impacted her worse performance, if she didn't feel like going she wouldn't. Wouldn't drink before work, though did call in sick once when she was intoxicated and went back to sleep. Had previously tried to stop drinking, though with withdrawal symptoms she would try to cut down and ended up drinking more again.      Did pay cash for rehab though not financial stressed presently.      Patient reports she completed program at [facility], believes she benefited from it greatly. Her partner stopped drinking the same day that she did, when she entered the hospital for withdrawal. He is also attending AA, though didn't go to rehab and doesn't yet have a sponsor; he is supportive.      Patient reports she experienced feeling anhedonic in the past, though this has since resolved; she is on treatment  with prozac that her ob/gyn started when she had perimenopausal symptoms and reports it has been well tolerated. Additionally started trazodone during rehab stay, helped her sleep well. Admits that she has dealt with worries about the next day, particularly when trying to sleep and some social anxiety though doesn't experience debilitating symptoms.    SUBJECTIVE:     Interval Hx:  1/28/19 Ms. Flores reports that she has been well, denies SE to medications or cravings. Continues to attend AA daily, changed group due to timing of IOP and notes that new group is particularly crowded though did not find experience off putting.     Toxicology Screen: 1/28 etoh breath test negative, etoh biomarkers in process, utox negative   Medication Side Effects: None  Cravings: no  No other acute psychiatric issues reported at this time.    1/30/19 Reports she has been well, AA daily, no SE to medications. No cravings reported.     Toxicology Screen: 1/28 etoh breath test negative, etoh biomarkers negative, utox negative; 1/30 breath test negative, utox in process   Medication Side Effects: None  Cravings: no  No other acute psychiatric issues reported at this time.    2/1/19  Pt hyperverbal, laughing throughout interview.  Pt reports that she is doing well today.  Recounts events preceding her admission to IOP program in detail.  Endorses good sleep, good appetite.  Denies racing thoughts or increased energy.  Attending AA daily.  Denies SE to medication.  Denies cravings.  Wishes to continue current medication regimen.      Toxicology Screen: UDS 1/30 negative, breath alcohol 2/1 negative, UDS 2/1 negative  Medication Side Effects: denies  Cravings: denies  No other acute psychiatric issues reported at this time.    Scheduled Meds:   No current outpatient medications on file prior to encounter.     No current facility-administered medications on file prior to encounter.      Allergies:  Patient has no known  "allergies.    Psychiatric Review Of Systems:  Denies    Medical ROS:  See H&P dated 1/25/19 for ROS.     OBJECTIVE:     Vital Signs (Most Recent):  Vitals:    02/01/19 1139   BP: 125/74   Pulse: 90   Resp: 16       Mental Status Exam:  Appearance: unremarkable, age appropriate, casual clothing   Level of Consciousness: awake   Behavior/Cooperation: friendly and cooperative  Psychomotor: unremarkable   Speech: normal tone, hyperverbal, normal pitch, normal volume  Language: english, fluid  Orientation: person, place, situation, time/date  Attention Span/Concentration: intact   Memory: intact to conversation  Mood: "good"  Affect: mood congruent, reactive  Thought Process: linear, normal and logical  Associations: normal and logical  Thought Content: normal, no suicidality, no homicidality, delusions, or paranoia  Fund of Knowledge: Aware of current events  Abstraction: proverbs were abstract on initial assessment, did not re-assess   Insight: good  Judgment: good    Laboratory:  Recent Results (from the past 168 hour(s))   Toxicology screen, urine    Collection Time: 01/28/19 11:21 AM   Result Value Ref Range    Alcohol, Urine <10 <10 mg/dL    Benzodiazepines Negative     Methadone metabolites Negative     Cocaine (Metab.) Negative     Opiate Scrn, Ur Negative     Barbiturate Screen, Ur Negative     Amphetamine Screen, Ur Negative     THC Negative     Phencyclidine Negative     Creatinine, Random Ur 28.0 15.0 - 325.0 mg/dL    Toxicology Information SEE COMMENT    Alcohol Biomarkers, urine    Collection Time: 01/28/19 11:21 AM   Result Value Ref Range    Ethyl Glucuronide NEGATIVE    POCT BREATH ALCOHOL TEST    Collection Time: 01/28/19 11:22 AM   Result Value Ref Range    Breath Alcohol 0.000    POCT BREATH ALCOHOL TEST    Collection Time: 01/29/19 11:46 AM   Result Value Ref Range    Breath Alcohol 0.000    Toxicology screen, urine    Collection Time: 01/30/19 11:15 AM   Result Value Ref Range    Alcohol, Urine <10 " <10 mg/dL    Benzodiazepines Negative     Methadone metabolites Negative     Cocaine (Metab.) Negative     Opiate Scrn, Ur Negative     Barbiturate Screen, Ur Negative     Amphetamine Screen, Ur Negative     THC Negative     Phencyclidine Negative     Creatinine, Random Ur 87.0 15.0 - 325.0 mg/dL    Toxicology Information SEE COMMENT    POCT BREATH ALCOHOL TEST    Collection Time: 01/30/19 11:16 AM   Result Value Ref Range    Breath Alcohol 0.000    POCT BREATH ALCOHOL TEST    Collection Time: 01/31/19 11:19 AM   Result Value Ref Range    Breath Alcohol 0.000    POCT BREATH ALCOHOL TEST    Collection Time: 02/01/19 11:32 AM   Result Value Ref Range    Breath Alcohol 0.000      ASSESSMENT:     Glo Flores is a 48 y.o. female with a past psychiatric history of alcohol use disorder, who presented to ABU IOP due to same.      Ms. Flores has had significant dysfunction from alcohol use disorder causing occupational, social, and interpersonal dysfunction and failed attempts to maintain sobriety and complex withdrawal syndrome who has now completed residential rehab and would benefit from continued care in a supportive setting.  Pt hyperverbal throughout interview 2/1, unclear if demonstrating evidence of hypomania vs personality/personal norm.  Pt denies any current symptom of wilma.  Will continue to monitor closely.  Pt requires IOP level of care due to interpersonal and occupation dysfunction and negative health effects from alcohol use in addition to lack of clarity regarding primary psychiatric illness.     IMPRESSION  Alcohol use disorder, severe, in early remission   Hx of depression, unspecified, in remission   Hx [facility]D    PLAN:     · Continue patient on ABU protocol.  · Breathalyzer and urine toxicology daily.  · VS daily x 3 days.  · CBC, CMP, GGT; will review patient records prior to reordering as she reports recently had labs at rehab, requested labs from outside facility, awaiting fax   · Patient  counseled on abstinence from alcohol.     Medications: Continue current medications.  · Trazodone 50mg po qdaily (since 11/2018) off label for insomnia   · Prozac 20mg po qdaily off label for perimenopausal symptoms per ob/gyn   · Metoprolol 12.5mg po qdaily for HTN  · For Alcohol Use Disorder, will encourage patient to start campral or natlrexone once we are able to review labs from outside facility or order them here.     Status: Continue treatment on ABU     Alexis Roberts MD  Psychiatry PGY-2  02/01/2019, 5:05 PM  Pager: [phone]    I have independently evaluated the patient. Case was discussed with resident and above note was reviewed and edited when appropriate. I agree with its current contents and plan.

## 2019-02-01 NOTE — PROGRESS NOTES
Group Psychotherapy (PhD/LCSW)    Site: [facility]    Clinical status of patient: Intensive Outpatient Program (IOP)    Date: 2/1/2019    Group Focus: Stress Management    Length of service: 74191 - 45-50 minutes    Number of patients in attendance: 12    Referred by: Addictive Behavior Unit Treatment Team    Target symptoms: Alcohol Abuse    Patient's response to treatment: Active Listening    Progress toward goals: Progressing adequately    Interval History: Group learned mindfulness techniques (breath, sound) to improve present-moment awareness, impulsive behavior tendencies, and tolerance of various emotional states.    Diagnosis: Alcohol Use Disorder    Plan: Continue treatment on ABU

## 2019-02-04 ENCOUNTER — HOSPITAL ENCOUNTER (OUTPATIENT)
Dept: PSYCHIATRY | Facility: HOSPITAL | Age: 49
Discharge: HOME | End: 2019-02-04
Attending: PSYCHIATRY & NEUROLOGY
Payer: COMMERCIAL

## 2019-02-04 ENCOUNTER — OCCUPATIONAL HEALTH (OUTPATIENT)
Dept: URGENT CARE | Facility: CLINIC | Age: 49
End: 2019-02-04

## 2019-02-04 VITALS — SYSTOLIC BLOOD PRESSURE: 127 MMHG | DIASTOLIC BLOOD PRESSURE: 75 MMHG | HEART RATE: 88 BPM | RESPIRATION RATE: 16 BRPM

## 2019-02-04 DIAGNOSIS — F10.20 ALCOHOL USE DISORDER, SEVERE, DEPENDENCE: Primary | ICD-10-CM

## 2019-02-04 DIAGNOSIS — Z02.83 ENCOUNTER FOR DRUG SCREENING: Primary | ICD-10-CM

## 2019-02-04 LAB — BREATH ALCOHOL: 0

## 2019-02-04 PROCEDURE — 90853 PR GROUP PSYCHOTHERAPY: ICD-10-PCS | Mod: 59,HP,HB, | Performed by: PSYCHOLOGIST

## 2019-02-04 PROCEDURE — 80305 DRUG TEST PRSMV DIR OPT OBS: CPT | Mod: S$GLB,,, | Performed by: EMERGENCY MEDICINE

## 2019-02-04 PROCEDURE — 90853 GROUP PSYCHOTHERAPY: CPT | Performed by: SOCIAL WORKER

## 2019-02-04 PROCEDURE — 80307 DRUG TEST PRSMV CHEM ANLYZR: CPT

## 2019-02-04 PROCEDURE — 90853 GROUP PSYCHOTHERAPY: CPT | Mod: HP,HB,, | Performed by: PSYCHOLOGIST

## 2019-02-04 PROCEDURE — 90853 GROUP PSYCHOTHERAPY: CPT

## 2019-02-04 PROCEDURE — 80305 PR COLLECTION ONLY DRUG SCREEN: ICD-10-PCS | Mod: S$GLB,,, | Performed by: EMERGENCY MEDICINE

## 2019-02-04 NOTE — PROGRESS NOTES
Group Psychotherapy (PhD/LCSW)    Site: [facility]    Clinical status of patient: Intensive Outpatient Program (IOP)    Date: 2/4/2019    Group Focus: Communication Skills       Length of service: 51781 - 45-50 minutes    Number of patients in attendance: 9    Referred by: Addictive Behavior Unit Treatment Team    Target symptoms: Alcohol Abuse    Patient's response to treatment: Active Listening and Self-disclosure    Progress toward goals: Progressing adequately    Interval History: Discussed both the non-verbal components of listening skills and the concept of Reflective Listening. Demonstrated their value through vignettes and role play.     Diagnosis: alcohol use disorder, severe, dependence    Plan: Continue treatment on ABU

## 2019-02-04 NOTE — PROGRESS NOTES
Subjective:       Patient ID: Glo Flores is a 48 y.o. female.    Chief Complaint: No chief complaint on file.    [facility] blood collection. [facility]      MAMI    Objective:      Physical Exam    Assessment:       No diagnosis found.    Plan:                   No Follow-up on file.

## 2019-02-04 NOTE — PLAN OF CARE
02/01/19 1000   Activity/Group Therapy Checklist   Group Educational  (grief/loss )   Attendance Attended   Follows Direction Followed directions   Group Interactions/Observations Interacted appropriately   Affect/Mood Range Normal range   Affect/Mood Display Appropriate   Goal Progression Progressing

## 2019-02-04 NOTE — PLAN OF CARE
02/04/19 1400   Activity/Group Therapy Checklist   Group Addiction Education   Attendance Attended   Follows Direction Followed directions   Group Interactions/Observations Interacted appropriately   Affect/Mood Range Normal range   Affect/Mood Display Appropriate   Goal Progression Progressing

## 2019-02-04 NOTE — PROGRESS NOTES
"2019 5:28 PM  Name: Glo Flores  : 1970  Start Date: 19    [facility] Intensive Outpatient Program   Progress Note    Status: Intensive Outpatient Program (IOP)    HPI:  Glo Flores is a 48 y.o. female with a past psychiatric history of alcohol use, who presented to [facility]BU IOP due to same.      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 States that from age [age] had a partner that didn't drink etoh and neither did she, then their relationship ended. Mentions she was isolated during that time in her life from others. After that relationship ended, she made more social connections, developed a close group of friends, and her support system improved. She also notes that she began to have a glass of wine every so often, started going out with friends.      Prior to , didn't drink except socially from age [age], about 3-4 glasses of wine a week, slowly increased over time.  At work in , she experienced stress due to change in her supervisor. She felt her work performance remained the same, though in contrast to assessments with previous supervisor, she received negative evaluations. This was a large source of dissatisfaction and distress for her in the workplace.   Went on a cruise with her friends, met her current partner who drank heavily; per patient he has a problem with alcohol as well and has since stopped drinking.   Reports these two events correlated with when she started drinking more. 2 bottles of wine per night, though then noticed she would drink whatever was available.      She notes she began to experience dysfunction during that time, and friends told her she needed to stop drinking. 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 Though states that she was "too far in" and started reporting to " another supervisor.      End of July 2018 she was suspended from work due to issues with penmanship (due to tremulousness), missed work time and attendance; that day reported herself to the board of nursing. End of September went for her evaluation at [facility] addiction center and was recommended residential treatment. Started going to AA on [address] since beginning of September, then went to rehab 11/19/19.   Was able to stop drinking during interval from hospitalization to rehab admission by going to AA daily.      Was treated in the hospital with DTs and hypokalemia in 8/2018 when she tried to stop drinking, though had alcohol withdrawal, so presented to hospital tremulousness. Last drink 8/30/2018, was in hospital on morning of admission. Talks about being under PEC at the hospital, has vague memories of that time. Parents visited with her then, she reports her family is supportive.      Talks about a higher power during interview, and friends who offered her help; these are sources of strength.      Remarks that friends noticed that she was drinking more, isolating, tried to tell her to stop but she wouldn't listen. Impacted her worse performance, if she didn't feel like going she wouldn't. Wouldn't drink before work, though did call in sick once when she was intoxicated and went back to sleep. Had previously tried to stop drinking, though with withdrawal symptoms she would try to cut down and ended up drinking more again.      Did pay cash for rehab though not financial stressed presently.      Patient reports she completed program at [facility], believes she benefited from it greatly. Her partner stopped drinking the same day that she did, when she entered the hospital for withdrawal. He is also attending AA, though didn't go to rehab and doesn't yet have a sponsor; he is supportive.      Patient reports she experienced feeling anhedonic in the past, though this has since resolved; she is on treatment  with prozac that her ob/gyn started when she had perimenopausal symptoms and reports it has been well tolerated. Additionally started trazodone during rehab stay, helped her sleep well. Admits that she has dealt with worries about the next day, particularly when trying to sleep and some social anxiety though doesn't experience debilitating symptoms.    SUBJECTIVE:     Interval Hx:  1/28/19 Ms. Flores reports that she has been well, denies SE to medications or cravings. Continues to attend AA daily, changed group due to timing of IOP and notes that new group is particularly crowded though did not find experience off putting.     Toxicology Screen: 1/28 etoh breath test negative, etoh biomarkers in process, utox negative   Medication Side Effects: None  Cravings: no  No other acute psychiatric issues reported at this time.    1/30/19 Reports she has been well, AA daily, no SE to medications. No cravings reported.     Toxicology Screen: 1/28 etoh breath test negative, etoh biomarkers negative, utox negative; 1/30 breath test negative, utox in process   Medication Side Effects: None  Cravings: no  No other acute psychiatric issues reported at this time.    2/1/19  Pt hyperverbal, laughing throughout interview.  Pt reports that she is doing well today.  Recounts events preceding her admission to IOP program in detail.  Endorses good sleep, good appetite.  Denies racing thoughts or increased energy.  Attending AA daily.  Denies SE to medication.  Denies cravings.  Wishes to continue current medication regimen.      Toxicology Screen: UDS 1/30 negative, breath alcohol 2/1 negative, UDS 2/1 negative  Medication Side Effects: denies  Cravings: denies  No other acute psychiatric issues reported at this time.    2/4/19   Reports having a good weekend. Got some alone time at home, watched TV, denied urges to drink. Sleeping well. Mood has been good. One of her tests from [facility] came back as positive, was reportedly due to  "fluoxetine, needed updated med list for RNP. Going to meetings at [place], has gotten a sponsor. Feeling eager to get back to work but acknowledges she needs to prioritize sobriety.    Toxicology Screen: UDS 2/4 negative, breath alcohol 2/4 negative, UDS 2/1 negative  Medication Side Effects: denies  Cravings: denies  No other acute psychiatric issues reported at this time.  Scheduled Meds:   No current outpatient medications on file prior to encounter.     No current facility-administered medications on file prior to encounter.      Allergies:  Patient has no known allergies.    Psychiatric Review Of Systems:  Denies    Medical ROS:  See H&P dated 1/25/19 for ROS.     OBJECTIVE:     Vital Signs (Most Recent):  Vitals:    02/04/19 1119   BP: 127/75   Pulse: 88   Resp: 16       Mental Status Exam:  Appearance: unremarkable, age appropriate, casual clothing   Level of Consciousness: awake   Behavior/Cooperation: friendly and cooperative  Psychomotor: unremarkable   Speech: normal tone, talkative but interruptable, normal pitch, normal volume  Language: english, fluid  Orientation: person, place, situation, time/date  Attention Span/Concentration: intact   Memory: intact to conversation  Mood: "good"  Affect: mood congruent, reactive  Thought Process: linear, normal and logical  Associations: normal and logical  Thought Content: normal, no suicidality, no homicidality, delusions, or paranoia  Fund of Knowledge: Aware of current events  Abstraction: proverbs were abstract on initial assessment, did not re-assess   Insight: good  Judgment: good    Laboratory:  Recent Results (from the past 168 hour(s))   POCT BREATH ALCOHOL TEST    Collection Time: 01/29/19 11:46 AM   Result Value Ref Range    Breath Alcohol 0.000    Toxicology screen, urine    Collection Time: 01/30/19 11:15 AM   Result Value Ref Range    Alcohol, Urine <10 <10 mg/dL    Benzodiazepines Negative     Methadone metabolites Negative     Cocaine (Metab.) " "Negative     Opiate Scrn, Ur Negative     Barbiturate Screen, Ur Negative     Amphetamine Screen, Ur Negative     THC Negative     Phencyclidine Negative     Creatinine, Random Ur 87.0 15.0 - 325.0 mg/dL    Toxicology Information SEE COMMENT    POCT BREATH ALCOHOL TEST    Collection Time: 01/30/19 11:16 AM   Result Value Ref Range    Breath Alcohol 0.000    POCT BREATH ALCOHOL TEST    Collection Time: 01/31/19 11:19 AM   Result Value Ref Range    Breath Alcohol 0.000    POCT BREATH ALCOHOL TEST    Collection Time: 02/01/19 11:32 AM   Result Value Ref Range    Breath Alcohol 0.000    Toxicology screen, urine    Collection Time: 02/01/19 11:32 AM   Result Value Ref Range    Alcohol, Urine <10 <10 mg/dL    Benzodiazepines Negative     Methadone metabolites Negative     Cocaine (Metab.) Negative     Opiate Scrn, Ur Negative     Barbiturate Screen, Ur Negative     Amphetamine Screen, Ur Negative     THC Negative     Phencyclidine Negative     Creatinine, Random Ur 55.0 15.0 - 325.0 mg/dL    Toxicology Information SEE COMMENT    POCT BREATH ALCOHOL TEST    Collection Time: 02/04/19 11:20 AM   Result Value Ref Range    Breath Alcohol 0.000      ASSESSMENT:     Glo Flores is a 48 y.o. female with a past psychiatric history of alcohol use disorder, who presented to [facility]BU IOP due to same.      Ms. Flores has had significant dysfunction from alcohol use disorder causing occupational, social, and interpersonal dysfunction and failed attempts to maintain sobriety and complex withdrawal syndrome who has now completed residential rehab and would benefit from continued care in a supportive setting.  Pt hyperverbal throughout interview 2/1, unclear if demonstrating evidence of hypomania vs personality/personal norm. Today talkative, but interruptable, describes being a talker, often talking to "fill the air" at baseline. Pt denies any current symptom of wilma.  Will continue to monitor closely.  Pt requires IOP level of care due " to interpersonal and occupation dysfunction and negative health effects from alcohol use in addition to lack of clarity regarding primary psychiatric illness.     IMPRESSION  Alcohol use disorder, severe, in early remission   Hx of depression, unspecified, in remission   Hx OCPD    PLAN:     · Continue patient on ABU protocol.  · Breathalyzer and urine toxicology daily.  · VS daily x 3 days.  · CBC, CMP, GGT; will review patient records prior to reordering as she reports recently had labs at rehab, requested labs from outside facility, awaiting fax   · Patient counseled on abstinence from alcohol.     Medications: Continue current medications.  · Trazodone 50mg po qdaily (since 11/2018) off label for insomnia   · Prozac 20mg po qdaily off label for perimenopausal symptoms per ob/gyn   · Metoprolol 12.5mg po qdaily for HTN  · For Alcohol Use Disorder, will encourage patient to start campral or natlrexone once we are able to review labs from outside facility or order them here.     Status: Continue treatment on ABU     Nadira Juarez MD  Addiction Psychiatry Fellow

## 2019-02-04 NOTE — PLAN OF CARE
02/04/19 1000   Activity/Group Therapy Checklist   Group Educational  (family roles )   Attendance Attended   Follows Direction Followed directions   Group Interactions/Observations Interacted appropriately   Affect/Mood Range Normal range   Affect/Mood Display Appropriate   Goal Progression Progressing

## 2019-02-05 ENCOUNTER — HOSPITAL ENCOUNTER (OUTPATIENT)
Dept: PSYCHIATRY | Facility: HOSPITAL | Age: 49
Discharge: HOME | End: 2019-02-05
Attending: PSYCHIATRY & NEUROLOGY
Payer: COMMERCIAL

## 2019-02-05 DIAGNOSIS — F10.20 ALCOHOL USE DISORDER, SEVERE, DEPENDENCE: Primary | ICD-10-CM

## 2019-02-05 LAB
AMPHET+METHAMPHET UR QL: NEGATIVE
BARBITURATES UR QL SCN>200 NG/ML: NEGATIVE
BENZODIAZ UR QL SCN>200 NG/ML: NEGATIVE
BREATH ALCOHOL: 0
BZE UR QL SCN: NEGATIVE
CANNABINOIDS UR QL SCN: NEGATIVE
CREAT UR-MCNC: 116 MG/DL
ETHANOL UR-MCNC: <10 MG/DL
METHADONE UR QL SCN>300 NG/ML: NEGATIVE
OPIATES UR QL SCN: NEGATIVE
PCP UR QL SCN>25 NG/ML: NEGATIVE
TOXICOLOGY INFORMATION: NORMAL

## 2019-02-05 PROCEDURE — 90853 PR GROUP PSYCHOTHERAPY: ICD-10-PCS | Mod: HP,HB,, | Performed by: PSYCHOLOGIST

## 2019-02-05 PROCEDURE — 90853 GROUP PSYCHOTHERAPY: CPT | Mod: HP,HB,, | Performed by: PSYCHOLOGIST

## 2019-02-05 PROCEDURE — 90853 GROUP PSYCHOTHERAPY: CPT | Performed by: SOCIAL WORKER

## 2019-02-05 PROCEDURE — 90853 GROUP PSYCHOTHERAPY: CPT

## 2019-02-05 NOTE — PLAN OF CARE
02/05/19 1400   Activity/Group Therapy Checklist   Group Goals/Reflection   Attendance Attended   Follows Direction Followed directions   Group Interactions/Observations Interacted appropriately   Affect/Mood Range Normal range   Affect/Mood Display Appropriate   Goal Progression Progressing

## 2019-02-05 NOTE — PROGRESS NOTES
Group Psychotherapy (PhD/LCSW)    Site: [facility]    Clinical status of patient: Intensive Outpatient Program (IOP)    Date: 2/5/2019    Group Focus: CBT Group Therapy    Length of service: 87612 - 45-50 minutes    Number of patients in attendance: 10    Referred by: Addictive Behavior Unit Treatment Team    Target symptoms: Alcohol Abuse    Patient's response to treatment: Active Listening; Self-disclosure    Progress toward goals: Progressing adequately    Interval History: Session focus was Cognitive Restructuring (Part 2):  Identifying unhelpful and helpful thoughts.  Patients were provided with a worksheet on unhelpful thinking styles and how to identify helpful thoughts.  Patients read personal examples aloud of unhelpful and helpful thoughts.      Diagnosis: Alcohol Use Disorder, severe dependence     Plan: Continue treatment on ABU

## 2019-02-05 NOTE — PROGRESS NOTES
Group Psychotherapy (PhD/LCSW)    Site: [facility]    Clinical status of patient: Intensive Outpatient Program (IOP)    Date: 2/5/2019    Group Focus: Psychodynamic Group Psychotherapy    Length of service: 55726 - 45-50 minutes    Number of patients in attendance: 7    Referred by: Addictive Behavior Unit Treatment Team    Target symptoms: Alcohol Abuse    Patient's response to treatment: Active Listening and Self-disclosure    Progress toward goals: Progressing adequately    Interval History: Discussed deep breathing exercise that helps her. Discussed following all the RNP rules.    Diagnosis: alcohol use disorder, severe, dependence    Plan: Continue treatment on ABU

## 2019-02-05 NOTE — PROGRESS NOTES
"Group Psychotherapy (PhD/LCSW)    Site: [facility]    Clinical status of patient: Intensive Outpatient Program (IOP)    Date: 2/4/2019    Group Focus: Psychodynamic Group Psychotherapy    Length of service: 91485 - 45-50 minutes    Number of patients in attendance: 9    Referred by: Addictive Behavior Unit Treatment Team    Target symptoms: Alcohol Abuse    Patient's response to treatment: Active Listening and Self-disclosure    Progress toward goals: Progressing adequately    Interval History: Pt offered empathic support to a departing group member. She discussed having a good weekend and enjoying her "down time."     Diagnosis: alcohol use disorder, severe, dependence    Plan: Continue treatment on ABU        "

## 2019-02-06 ENCOUNTER — HOSPITAL ENCOUNTER (OUTPATIENT)
Dept: PSYCHIATRY | Facility: HOSPITAL | Age: 49
Discharge: HOME | End: 2019-02-06
Attending: PSYCHIATRY & NEUROLOGY
Payer: MEDICAID

## 2019-02-06 VITALS — RESPIRATION RATE: 18 BRPM | DIASTOLIC BLOOD PRESSURE: 73 MMHG | HEART RATE: 96 BPM | SYSTOLIC BLOOD PRESSURE: 110 MMHG

## 2019-02-06 DIAGNOSIS — F10.20 ALCOHOL USE DISORDER, SEVERE, DEPENDENCE: Primary | ICD-10-CM

## 2019-02-06 LAB
AMPHET+METHAMPHET UR QL: NEGATIVE
BARBITURATES UR QL SCN>200 NG/ML: NEGATIVE
BENZODIAZ UR QL SCN>200 NG/ML: NEGATIVE
BREATH ALCOHOL: 0
BZE UR QL SCN: NEGATIVE
CANNABINOIDS UR QL SCN: NEGATIVE
CREAT UR-MCNC: 25 MG/DL
ETHANOL UR-MCNC: <10 MG/DL
METHADONE UR QL SCN>300 NG/ML: NEGATIVE
OPIATES UR QL SCN: NEGATIVE
PCP UR QL SCN>25 NG/ML: NEGATIVE
TOXICOLOGY INFORMATION: NORMAL

## 2019-02-06 PROCEDURE — 90853 GROUP PSYCHOTHERAPY: CPT | Mod: HP,HB,, | Performed by: PSYCHOLOGIST

## 2019-02-06 PROCEDURE — 90853 PR GROUP PSYCHOTHERAPY: ICD-10-PCS | Mod: HP,HB,, | Performed by: PSYCHOLOGIST

## 2019-02-06 PROCEDURE — 90853 GROUP PSYCHOTHERAPY: CPT

## 2019-02-06 PROCEDURE — 80307 DRUG TEST PRSMV CHEM ANLYZR: CPT

## 2019-02-06 PROCEDURE — 90853 GROUP PSYCHOTHERAPY: CPT | Performed by: SOCIAL WORKER

## 2019-02-06 NOTE — PATIENT CARE CONFERENCE
ABU Staffing:   Alcohol use Disorder, Severe, in early remission  Hx of depression, unspecified in remission  Hx OCPD          1. Pt is attending all groups    2. Pt is attending all meetings  3. Pt 's has minimally supportive family  4. Pt has completed spiritual assessment    5. Pt will present life story    6. Pt will present Step One assignment    7. Pt is exploring issues related to relapse  prevention; spirituality; stress management; improved communication skills; assertiveness training; poor self-esteem; disease concepts; cross addictions; and, work related issues    8. D/C date:2/21     Staff discussed pt's boyfriend attending family day. Staff discussed boyfriend's non receptive behavior towards M.D. during family meeting. Staff discussed pt's anxiety regarding returning to work. Staff discussed pt's active participation in groups. Staff discussed pt's monopolizing behavior in group sessions. Staff discussed pt's dc date tentatively set for 2/21/19.     Problem: Alcohol use Disorder, Severe, in early remission  Goal: Address in 12 step meetings and group and individual sessions    Objective Measure: participation in groups, self report, length of sobriety, and relapse prevention plan  Time: Prior to discharge    Progress: Pt is attending groups and sessions       Problem: Hx of depression, unspecified in remission  Goal: Address in 12 step meetings and group and individual sessions    Objective Measure: participation in groups, self report, length of sobriety, and relapse prevention plan  Time: Prior to discharge    Progress: Pt is attending groups and sessions       Problem: Hx OCPD  Goal: Address in 12 step meetings and group and individual sessions    Objective Measure: participation in groups, self report, length of sobriety, and relapse prevention plan  Time: Prior to discharge    Progress: Pt is attending groups and sessions           Staff members present:    MD Dr. Ann Carranza MD  Fellow  MD Jessica Resident  Dr. Victoria, Chucky.[email or web address] Live, [facility]  Joyce Sanchez, [facility]  Kai Valdez, [facility]  Della Davis RN

## 2019-02-06 NOTE — PLAN OF CARE
02/06/19 1400   Activity/Group Therapy Checklist   Group Educational  (rigorous honesty )   Attendance Attended   Follows Direction Followed directions   Group Interactions/Observations Interacted appropriately   Affect/Mood Range Normal range   Affect/Mood Display Appropriate   Goal Progression Progressing

## 2019-02-06 NOTE — PROGRESS NOTES
"2019 5:28 PM  Name: Glo Flores  : 1970  Start Date: 19    ABU Intensive Outpatient Program   Progress Note    Status: Intensive Outpatient Program (IOP)    HPI:  Glo Flores is a 48 y.o. female with a past psychiatric history of alcohol use, who presented to ABU IOP due to same.      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 States that from age [age] had a partner that didn't drink etoh and neither did she, then their relationship ended. Mentions she was isolated during that time in her life from others. After that relationship ended, she made more social connections, developed a close group of friends, and her support system improved. She also notes that she began to have a glass of wine every so often, started going out with friends.      Prior to , didn't drink except socially from age [age], about 3-4 glasses of wine a week, slowly increased over time.  At work in , she experienced stress due to change in her supervisor. She felt her work performance remained the same, though in contrast to assessments with previous supervisor, she received negative evaluations. This was a large source of dissatisfaction and distress for her in the workplace.   Went on a cruise with her friends, met her current partner who drank heavily; per patient he has a problem with alcohol as well and has since stopped drinking.   Reports these two events correlated with when she started drinking more. 2 bottles of wine per night, though then noticed she would drink whatever was available.      She notes she began to experience dysfunction during that time, and friends told her she needed to stop drinking. 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 Though states that she was "too far in" and started reporting to " another supervisor.      End of July 2018 she was suspended from work due to issues with penmanship (due to tremulousness), missed work time and attendance; that day reported herself to the board of nursing. End of September went for her evaluation at [facility] addiction center and was recommended residential treatment. Started going to AA on [address] since beginning of September, then went to rehab 11/19/19.   Was able to stop drinking during interval from hospitalization to rehab admission by going to AA daily.      Was treated in the hospital with DTs and hypokalemia in 8/2018 when she tried to stop drinking, though had alcohol withdrawal, so presented to hospital tremulousness. Last drink 8/30/2018, was in hospital on morning of admission. Talks about being under PEC at the hospital, has vague memories of that time. Parents visited with her then, she reports her family is supportive.      Talks about a higher power during interview, and friends who offered her help; these are sources of strength.      Remarks that friends noticed that she was drinking more, isolating, tried to tell her to stop but she wouldn't listen. Impacted her worse performance, if she didn't feel like going she wouldn't. Wouldn't drink before work, though did call in sick once when she was intoxicated and went back to sleep. Had previously tried to stop drinking, though with withdrawal symptoms she would try to cut down and ended up drinking more again.      Did pay cash for rehab though not financial stressed presently.      Patient reports she completed program at [facility], believes she benefited from it greatly. Her partner stopped drinking the same day that she did, when she entered the hospital for withdrawal. He is also attending AA, though didn't go to rehab and doesn't yet have a sponsor; he is supportive.      Patient reports she experienced feeling anhedonic in the past, though this has since resolved; she is on treatment  with prozac that her ob/gyn started when she had perimenopausal symptoms and reports it has been well tolerated. Additionally started trazodone during rehab stay, helped her sleep well. Admits that she has dealt with worries about the next day, particularly when trying to sleep and some social anxiety though doesn't experience debilitating symptoms.    SUBJECTIVE:     Interval Hx:  1/28/19 Ms. Flores reports that she has been well, denies SE to medications or cravings. Continues to attend AA daily, changed group due to timing of IOP and notes that new group is particularly crowded though did not find experience off putting.     Toxicology Screen: 1/28 etoh breath test negative, etoh biomarkers in process, utox negative   Medication Side Effects: None  Cravings: no  No other acute psychiatric issues reported at this time.    1/30/19 Reports she has been well, AA daily, no SE to medications. No cravings reported.     Toxicology Screen: 1/28 etoh breath test negative, etoh biomarkers negative, utox negative; 1/30 breath test negative, utox in process   Medication Side Effects: None  Cravings: no  No other acute psychiatric issues reported at this time.    2/1/19  Pt hyperverbal, laughing throughout interview.  Pt reports that she is doing well today.  Recounts events preceding her admission to IOP program in detail.  Endorses good sleep, good appetite.  Denies racing thoughts or increased energy.  Attending AA daily.  Denies SE to medication.  Denies cravings.  Wishes to continue current medication regimen.      Toxicology Screen: UDS 1/30 negative, breath alcohol 2/1 negative, UDS 2/1 negative  Medication Side Effects: denies  Cravings: denies  No other acute psychiatric issues reported at this time.    2/4/19   Reports having a good weekend. Got some alone time at home, watched TV, denied urges to drink. Sleeping well. Mood has been good. One of her tests from [facility] came back as positive, was reportedly due to  fluoxetine, needed updated med list for RNP. Going to meetings at [facility], has gotten a sponsor. Feeling eager to get back to work but acknowledges she needs to prioritize sobriety.    Toxicology Screen: UDS 2/4 negative, breath alcohol 2/4 negative, UDS 2/1 negative  Medication Side Effects: denies  Cravings: denies  No other acute psychiatric issues reported at this time.    2/6/19  Partner:  Family day was yesterday and Kellie boyfriend came to the session yesterday. She and her boyfriend attend AA together and they are maintaining their sobriety. She mentioned that she has learned from mistakes of past relationships and is working on not repeating the same mistakes with her current partner.    Work:  She is taking steps to get back to work. Speaking with future supervisor to work out her starting schedule. She needs to find out what the RNP requires. Her meetings for RNP will be Monday afternoons at [facility]. 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 We discussed her tendency to procrastinate and she is working on improving this trait.    Mood: Doing great.  Sleep: Sleeping well. In bed by 8-9pm last night  Appetite: Unchanged    Toxicology Screen: UDS 2/4 negative, breath alcohol 2/5 negative, UDS 2/1 negative  Medication Side Effects: denies  Cravings: denies. Sometimes Glo is reminded of drinking when she goes through the aisle at [employer], so she tries her best to ignore this.  No other acute psychiatric issues reported at this time.      Scheduled Meds:   No current outpatient medications on file prior to encounter.     No current facility-administered medications on file prior to encounter.      Allergies:  Patient has no known allergies.    Psychiatric Review Of Systems:  Denies    Medical ROS:  See H&P dated 1/25/19 for ROS.     OBJECTIVE:     Vital Signs (Most Recent):  There were no  "vitals filed for this visit.    Mental Status Exam:  Appearance: unremarkable, age appropriate, casual clothing   Level of Consciousness: awake   Behavior/Cooperation: friendly and cooperative  Psychomotor: unremarkable   Speech: normal tone, talkative but interruptable, normal pitch, normal volume  Language: english, fluid  Orientation: person, place, situation, time/date  Attention Span/Concentration: intact   Memory: intact to conversation  Mood: "good"  Affect: mood congruent, reactive  Thought Process: linear, normal and logical  Associations: normal and logical  Thought Content: normal, no suicidality, no homicidality, delusions, or paranoia  Fund of Knowledge: Aware of current events  Abstraction: proverbs were abstract on initial assessment, did not re-assess   Insight: good  Judgment: good    Laboratory:  Recent Results (from the past 168 hour(s))   Toxicology screen, urine    Collection Time: 01/30/19 11:15 AM   Result Value Ref Range    Alcohol, Urine <10 <10 mg/dL    Benzodiazepines Negative     Methadone metabolites Negative     Cocaine (Metab.) Negative     Opiate Scrn, Ur Negative     Barbiturate Screen, Ur Negative     Amphetamine Screen, Ur Negative     THC Negative     Phencyclidine Negative     Creatinine, Random Ur 87.0 15.0 - 325.0 mg/dL    Toxicology Information SEE COMMENT    POCT BREATH ALCOHOL TEST    Collection Time: 01/30/19 11:16 AM   Result Value Ref Range    Breath Alcohol 0.000    POCT BREATH ALCOHOL TEST    Collection Time: 01/31/19 11:19 AM   Result Value Ref Range    Breath Alcohol 0.000    POCT BREATH ALCOHOL TEST    Collection Time: 02/01/19 11:32 AM   Result Value Ref Range    Breath Alcohol 0.000    Toxicology screen, urine    Collection Time: 02/01/19 11:32 AM   Result Value Ref Range    Alcohol, Urine <10 <10 mg/dL    Benzodiazepines Negative     Methadone metabolites Negative     Cocaine (Metab.) Negative     Opiate Scrn, Ur Negative     Barbiturate Screen, Ur Negative     " "Amphetamine Screen, Ur Negative     THC Negative     Phencyclidine Negative     Creatinine, Random Ur 55.0 15.0 - 325.0 mg/dL    Toxicology Information SEE COMMENT    Toxicology screen, urine    Collection Time: 02/04/19 11:19 AM   Result Value Ref Range    Alcohol, Urine <10 <10 mg/dL    Benzodiazepines Negative     Methadone metabolites Negative     Cocaine (Metab.) Negative     Opiate Scrn, Ur Negative     Barbiturate Screen, Ur Negative     Amphetamine Screen, Ur Negative     THC Negative     Phencyclidine Negative     Creatinine, Random Ur 116.0 15.0 - 325.0 mg/dL    Toxicology Information SEE COMMENT    POCT BREATH ALCOHOL TEST    Collection Time: 02/04/19 11:20 AM   Result Value Ref Range    Breath Alcohol 0.000    POCT BREATH ALCOHOL TEST    Collection Time: 02/05/19 10:46 AM   Result Value Ref Range    Breath Alcohol 0.000      ASSESSMENT:     Glo Flores is a 48 y.o. female with a past psychiatric history of alcohol use disorder, who presented to ABU IOP due to same.      Ms. Flores has had significant dysfunction from alcohol use disorder causing occupational, social, and interpersonal dysfunction and failed attempts to maintain sobriety and complex withdrawal syndrome who has now completed residential rehab and would benefit from continued care in a supportive setting.  Pt hyperverbal throughout interview 2/1, unclear if demonstrating evidence of hypomania vs personality/personal norm. Today talkative, but interruptable, describes being a talker, often talking to "fill the air" at baseline. Pt denies any current symptom of wilma.  Will continue to monitor closely.  Pt requires IOP level of care due to interpersonal and occupation dysfunction and negative health effects from alcohol use in addition to lack of clarity regarding primary psychiatric illness.     IMPRESSION  Alcohol use disorder, severe, in early remission   Hx of depression, unspecified, in remission   Hx OCPD    PLAN:     · Continue " patient on ABU protocol.  · Breathalyzer and urine toxicology daily.  · VS daily x 3 days.  · CBC, CMP, GGT, B12/Folate TSH pending.  · Patient counseled on abstinence from alcohol.     Medications: Continue current medications.  · Trazodone 50mg po qdaily (since 11/2018) off label for insomnia   · Prozac 20mg po qdaily off label for perimenopausal symptoms per ob/gyn   · Metoprolol 12.5mg po qdaily for HTN  · For Alcohol Use Disorder, will encourage patient to start campral or natlrexone once labs are back.     Status: Continue treatment on ABU     Nadira Juarez MD  Addiction Psychiatry Fellow

## 2019-02-06 NOTE — PROGRESS NOTES
Group Psychotherapy (PhD/LCSW)    Site: [facility]    Clinical status of patient: Intensive Outpatient Program (IOP)    Date: 2/6/2019    Group Focus: Psychodynamic Group Psychotherapy    Length of service: 63514 - 45-50 minutes    Number of patients in attendance: 6    Referred by: Addictive Behavior Unit Treatment Team    Target symptoms: Alcohol Abuse    Patient's response to treatment: Active Listening and Self-disclosure    Progress toward goals: Progressing adequately    Interval History: Discussed plans to get everything organized so she can return to work right after discharge.    Diagnosis: alcohol use disorder, severe, dependence    Plan: Continue treatment on ABU

## 2019-02-06 NOTE — PROGRESS NOTES
Group Psychotherapy (PhD/LCSW)    Site: [facility]    Clinical status of patient: Intensive Outpatient Program (IOP)    Date: 2/5/2019    Group Focus: Disease Model of Addiction      Length of service: 09575 - 45-50 minutes    Number of patients in attendance: 8    Referred by: Addictive Behavior Unit Treatment Team    Target symptoms: Alcohol Abuse    Patient's response to treatment: Active Listening and Self-disclosure    Progress toward goals: Progressing adequately    Interval History: Discussed basic neuropsychological concepts of the Disease Model of Addiction and their relationship to the phenomena of addiction (eg, powerlessness, euphoric recall, craving, using dreams, tolerance, relapse, etc). Noted the value of understanding the Disease Model for sustaining long-term sobriety.     Diagnosis: alcohol use disorder, severe, dependence    Plan: Continue treatment on ABU

## 2019-02-06 NOTE — PROGRESS NOTES
Group Psychotherapy (PhD/LCSW)    Site: [facility]    Clinical status of patient: Intensive Outpatient Program (IOP)    Date: 2/6/2019    Group Focus: CBT Group Therapy    Length of service: 04365 - 45-50 minutes    Number of patients in attendance: 9    Referred by: Addictive Behavior Unit Treatment Team    Target symptoms: Alcohol Abuse    Patient's response to treatment: Active Listening; Self-disclosure    Progress toward goals: Progressing adequately    Interval History:Session focus was Cognitive Restructuring (Part 3):  Using the ABCD model to help identify helpful thoughts in situations.  Patients were encouraged to identify antecedents (triggers), beliefs, consequences/feelings, and different (helpful) thoughts.    Diagnosis: Alcohol Use Disorder, severe dependence     Plan: Continue treatment on ABU

## 2019-02-07 ENCOUNTER — HOSPITAL ENCOUNTER (OUTPATIENT)
Dept: PSYCHIATRY | Facility: HOSPITAL | Age: 49
Discharge: HOME | End: 2019-02-07
Attending: PSYCHIATRY & NEUROLOGY
Payer: MEDICAID

## 2019-02-07 DIAGNOSIS — F10.20 ALCOHOL USE DISORDER, SEVERE, DEPENDENCE: Primary | ICD-10-CM

## 2019-02-07 LAB — BREATH ALCOHOL: 0

## 2019-02-07 PROCEDURE — 90853 GROUP PSYCHOTHERAPY: CPT

## 2019-02-07 PROCEDURE — 90853 GROUP PSYCHOTHERAPY: CPT | Mod: HP,HB,, | Performed by: PSYCHOLOGIST

## 2019-02-07 PROCEDURE — 90853 PR GROUP PSYCHOTHERAPY: ICD-10-PCS | Mod: HP,HB,, | Performed by: PSYCHOLOGIST

## 2019-02-07 PROCEDURE — 90853 GROUP PSYCHOTHERAPY: CPT | Performed by: SOCIAL WORKER

## 2019-02-07 NOTE — PROGRESS NOTES
"Group Psychotherapy (PhD/LCSW)    Site: [facility]    Clinical status of patient: Intensive Outpatient Program (IOP)    Date: 2/7/2019    Group Focus: Stress Management       Length of service: 58385 - 45-50 minutes    Number of patients in attendance: 9    Referred by: Addictive Behavior Unit Treatment Team    Target symptoms: Alcohol Abuse    Patient's response to treatment: Active Listening and Self-disclosure    Progress toward goals: Progressing adequately    Interval History:  Discussed the dangers posed by expectations when they are excessively high or excessively low. Gave examples of the way excessively negative expectations can become "self-fulfilling prophecies."     Diagnosis: alcohol use disorder, severe, dependence    Plan: Continue treatment on ABU        "

## 2019-02-07 NOTE — PROGRESS NOTES
Group Psychotherapy (PhD/LCSW)    Site: [facility]    Clinical status of patient: Intensive Outpatient Program (IOP)    Date: 2/7/2019    Group Focus: Psychodynamic Group Psychotherapy    Length of service: 30154 - 45-50 minutes    Number of patients in attendance: 7    Referred by: Addictive Behavior Unit Treatment Team    Target symptoms: Alcohol Abuse    Patient's response to treatment: Active Listening and Self-disclosure    Progress toward goals: Progressing adequately    Interval History: Discussed boyfriend attending AA with her.    Diagnosis: alcohol use disorder, severe, dependence    Plan: Continue treatment on ABU

## 2019-02-07 NOTE — PLAN OF CARE
02/07/19 1000   Activity/Group Therapy Checklist   Group Relapse Prevention   Attendance Attended   Follows Direction Followed directions   Group Interactions/Observations Interacted appropriately   Affect/Mood Range Normal range   Affect/Mood Display Appropriate   Goal Progression Progressing

## 2019-02-07 NOTE — PLAN OF CARE
02/07/19 1400   Activity/Group Therapy Checklist   Group Addiction Education  (1st step)   Attendance Attended   Follows Direction Followed directions   Group Interactions/Observations Interacted appropriately;Sharing;Supportive   Affect/Mood Range Normal range   Affect/Mood Display Appropriate   Goal Progression Progressing

## 2019-02-08 ENCOUNTER — HOSPITAL ENCOUNTER (OUTPATIENT)
Dept: PSYCHIATRY | Facility: HOSPITAL | Age: 49
Discharge: HOME | End: 2019-02-08
Attending: PSYCHIATRY & NEUROLOGY
Payer: MEDICAID

## 2019-02-08 VITALS — DIASTOLIC BLOOD PRESSURE: 77 MMHG | SYSTOLIC BLOOD PRESSURE: 113 MMHG | RESPIRATION RATE: 16 BRPM | HEART RATE: 91 BPM

## 2019-02-08 DIAGNOSIS — F10.20 ALCOHOL USE DISORDER, SEVERE, DEPENDENCE: Primary | ICD-10-CM

## 2019-02-08 LAB
AMPHET+METHAMPHET UR QL: NEGATIVE
BARBITURATES UR QL SCN>200 NG/ML: NEGATIVE
BENZODIAZ UR QL SCN>200 NG/ML: NEGATIVE
BREATH ALCOHOL: 0
BREATH ALCOHOL: 0
BZE UR QL SCN: NEGATIVE
CANNABINOIDS UR QL SCN: NEGATIVE
CREAT UR-MCNC: 27 MG/DL
ETHANOL UR-MCNC: <10 MG/DL
ETHYL GLUCURONIDE: NEGATIVE
METHADONE UR QL SCN>300 NG/ML: NEGATIVE
OPIATES UR QL SCN: NEGATIVE
PCP UR QL SCN>25 NG/ML: NEGATIVE
TOXICOLOGY INFORMATION: NORMAL

## 2019-02-08 PROCEDURE — 90853 GROUP PSYCHOTHERAPY: CPT | Performed by: SOCIAL WORKER

## 2019-02-08 PROCEDURE — 90853 GROUP PSYCHOTHERAPY: CPT | Mod: HP,HB,, | Performed by: PSYCHOLOGIST

## 2019-02-08 PROCEDURE — 80307 DRUG TEST PRSMV CHEM ANLYZR: CPT

## 2019-02-08 PROCEDURE — 90853 PR GROUP PSYCHOTHERAPY: ICD-10-PCS | Mod: HP,HB,, | Performed by: PSYCHOLOGIST

## 2019-02-08 PROCEDURE — 90853 GROUP PSYCHOTHERAPY: CPT

## 2019-02-08 NOTE — PROGRESS NOTES
Group Psychotherapy (PhD/LCSW)    Site: [facility]    Clinical status of patient: Intensive Outpatient Program (IOP)    Date: 2/8/2019    Group Focus: The Dynamics of Relationship       Length of service: 70639 - 45-50 minutes    Number of patients in attendance: 12    Referred by: Addictive Behavior Unit Treatment Team    Target symptoms: Alcohol Abuse    Patient's response to treatment: Active Listening and Self-disclosure    Progress toward goals: Progressing adequately    Interval History:  Discussed strategies for ameliorating dysfunctional patterns of interaction by shifting the focus from trying to change the other person's behavior to changing one's own behavior.     Diagnosis: alcohol use disorder, severe, dependence    Plan: Continue treatment on ABU

## 2019-02-08 NOTE — PROGRESS NOTES
"2019 5:28 PM  Name: Glo Flores  : 1970  Start Date: 19    [facility]U Intensive Outpatient Program   Progress Note    Status: Intensive Outpatient Program (IOP)    HPI:  Glo Flores is a 48 y.o. female with a past psychiatric history of alcohol use, who presented to ABU IOP due to same.      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 States that from age [age] had a partner that didn't drink etoh and neither did she, then their relationship ended. Mentions she was isolated during that time in her life from others. After that relationship ended, she made more social connections, developed a close group of friends, and her support system improved. She also notes that she began to have a glass of wine every so often, started going out with friends.      Prior to , didn't drink except socially from age [age], about 3-4 glasses of wine a week, slowly increased over time.  At work in , she experienced stress due to change in her supervisor. She felt her work performance remained the same, though in contrast to assessments with previous supervisor, she received negative evaluations. This was a large source of dissatisfaction and distress for her in the workplace.   Went on a cruise with her friends, met her current partner who drank heavily; per patient he has a problem with alcohol as well and has since stopped drinking.   Reports these two events correlated with when she started drinking more. 2 bottles of wine per night, though then noticed she would drink whatever was available.      She notes she began to experience dysfunction during that time, and friends told her she needed to stop drinking. 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 Though states that she was "too far in" and started reporting to " another supervisor.      End of July 2018 she was suspended from work due to issues with penmanship (due to tremulousness), missed work time and attendance; that day reported herself to the board of nursing. End of September went for her evaluation at [facility] addiction center and was recommended residential treatment. Started going to AA on [address] since beginning of September, then went to rehab 11/19/19.   Was able to stop drinking during interval from hospitalization to rehab admission by going to AA daily.      Was treated in the hospital with DTs and hypokalemia in 8/2018 when she tried to stop drinking, though had alcohol withdrawal, so presented to hospital tremulousness. Last drink 8/30/2018, was in hospital on morning of admission. Talks about being under PEC at the hospital, has vague memories of that time. Parents visited with her then, she reports her family is supportive.      Talks about a higher power during interview, and friends who offered her help; these are sources of strength.      Remarks that friends noticed that she was drinking more, isolating, tried to tell her to stop but she wouldn't listen. Impacted her worse performance, if she didn't feel like going she wouldn't. Wouldn't drink before work, though did call in sick once when she was intoxicated and went back to sleep. Had previously tried to stop drinking, though with withdrawal symptoms she would try to cut down and ended up drinking more again.      Did pay cash for rehab though not financial stressed presently.      Patient reports she completed program at [facility], believes she benefited from it greatly. Her partner stopped drinking the same day that she did, when she entered the hospital for withdrawal. He is also attending AA, though didn't go to rehab and doesn't yet have a sponsor; he is supportive.      Patient reports she experienced feeling anhedonic in the past, though this has since resolved; she is on treatment  with prozac that her ob/gyn started when she had perimenopausal symptoms and reports it has been well tolerated. Additionally started trazodone during rehab stay, helped her sleep well. Admits that she has dealt with worries about the next day, particularly when trying to sleep and some social anxiety though doesn't experience debilitating symptoms.    SUBJECTIVE:     Interval Hx:  1/28/19 Ms. Flores reports that she has been well, denies SE to medications or cravings. Continues to attend AA daily, changed group due to timing of IOP and notes that new group is particularly crowded though did not find experience off putting.     Toxicology Screen: 1/28 etoh breath test negative, etoh biomarkers in process, utox negative   Medication Side Effects: None  Cravings: no  No other acute psychiatric issues reported at this time.    1/30/19 Reports she has been well, AA daily, no SE to medications. No cravings reported.     Toxicology Screen: 1/28 etoh breath test negative, etoh biomarkers negative, utox negative; 1/30 breath test negative, utox in process   Medication Side Effects: None  Cravings: no  No other acute psychiatric issues reported at this time.    2/1/19  Pt hyperverbal, laughing throughout interview.  Pt reports that she is doing well today.  Recounts events preceding her admission to IOP program in detail.  Endorses good sleep, good appetite.  Denies racing thoughts or increased energy.  Attending AA daily.  Denies SE to medication.  Denies cravings.  Wishes to continue current medication regimen.      Toxicology Screen: UDS 1/30 negative, breath alcohol 2/1 negative, UDS 2/1 negative  Medication Side Effects: denies  Cravings: denies  No other acute psychiatric issues reported at this time.    2/4/19   Reports having a good weekend. Got some alone time at home, watched TV, denied urges to drink. Sleeping well. Mood has been good. One of her tests from [facility] came back as positive, was reportedly due to  fluoxetine, needed updated med list for RNP. Going [place] meetings [place] [facility], has gotten a sponsor. Feeling eager to get back to work but acknowledges she needs to prioritize sobriety.    Toxicology Screen: UDS 2/4 negative, breath alcohol 2/4 negative, UDS 2/1 negative  Medication Side Effects: denies  Cravings: denies  No other acute psychiatric issues reported at this time.    2/6/19  Partner:  Family day was yesterday and Kellie boyfriend came to the session yesterday. She and her boyfriend attend AA together and they are maintaining their sobriety. She mentioned that she has learned from mistakes of past relationships and is working on not repeating the same mistakes with her current partner.    Work:  She is taking steps to get back to work. Speaking with future supervisor to work out her starting schedule. She needs to find out what the RNP requires. Her meetings for RNP will be Monday afternoons at [facility]. 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 We discussed her tendency to procrastinate and she is working on improving this trait.    Mood: Doing great.  Sleep: Sleeping well. In bed by 8-9pm last night  Appetite: Unchanged    Toxicology Screen: UDS 2/4 negative, breath alcohol 2/5 negative, UDS 2/1 negative  Medication Side Effects: denies  Cravings: denies. Sometimes Glo is reminded of drinking when she goes through the aisle at [email or web address], so she tries her best to ignore this.  No other acute psychiatric issues reported at this time.      2/8/19  Reports doing well. Met with supervisor at [facility], discussed working prn for first 6 months of her RNP contract. Feels good about this plan. Saw a lot of colleagues, reports reception from them was universally positive. Mood is good, sleeping well. Denies cravings. Reports that meeting with supervisor did bring up what she had done when she was  "using, and doesn't want to go back to that.   Discussed naltrexone, Glo declines this treatment at this point, says she doesn't have any desire to use, doesn't really see a role for the medication at this point. Discussed how it is something to keep on the table as an option moving forward.     Toxicology Screen: UDS 2/8 pending, breath alcohol today negative, UDS 2/6 negative  Medication Side Effects: denies  Cravings: denies.   No other acute psychiatric issues reported at this time.    Scheduled Meds:   No current outpatient medications on file prior to encounter.     No current facility-administered medications on file prior to encounter.      Allergies:  Patient has no known allergies.    Psychiatric Review Of Systems:  Denies    Medical ROS:  See H&P dated 1/25/19 for ROS.     OBJECTIVE:     Vital Signs (Most Recent):  Vitals:    02/08/19 1117   BP: 113/77   Pulse: 91   Resp: 16       Mental Status Exam:  Appearance: unremarkable, age appropriate, casual clothing   Level of Consciousness: awake   Behavior/Cooperation: friendly and cooperative  Psychomotor: unremarkable   Speech: normal tone, talkative but interruptable, normal pitch, normal volume  Language: english, fluid  Orientation: person, place, situation, time/date  Attention Span/Concentration: intact   Memory: intact to conversation  Mood: "good"  Affect: mood congruent, reactive  Thought Process: linear, normal and logical  Associations: normal and logical  Thought Content: normal, no suicidality, no homicidality, delusions, or paranoia  Fund of Knowledge: Aware of current events  Abstraction: proverbs were abstract on initial assessment, did not re-assess   Insight: good  Judgment: good    Laboratory:  Recent Results (from the past 168 hour(s))   Toxicology screen, urine    Collection Time: 02/04/19 11:19 AM   Result Value Ref Range    Alcohol, Urine <10 <10 mg/dL    Benzodiazepines Negative     Methadone metabolites Negative     Cocaine (Metab.) " Negative     Opiate Scrn, Ur Negative     Barbiturate Screen, Ur Negative     Amphetamine Screen, Ur Negative     THC Negative     Phencyclidine Negative     Creatinine, Random Ur 116.0 15.0 - 325.0 mg/dL    Toxicology Information SEE COMMENT    Alcohol Biomarkers, urine    Collection Time: 02/04/19 11:19 AM   Result Value Ref Range    Ethyl Glucuronide NEGATIVE    POCT BREATH ALCOHOL TEST    Collection Time: 02/04/19 11:20 AM   Result Value Ref Range    Breath Alcohol 0.000    POCT BREATH ALCOHOL TEST    Collection Time: 02/05/19 10:46 AM   Result Value Ref Range    Breath Alcohol 0.000    Toxicology screen, urine    Collection Time: 02/06/19 10:59 AM   Result Value Ref Range    Alcohol, Urine <10 <10 mg/dL    Benzodiazepines Negative     Methadone metabolites Negative     Cocaine (Metab.) Negative     Opiate Scrn, Ur Negative     Barbiturate Screen, Ur Negative     Amphetamine Screen, Ur Negative     THC Negative     Phencyclidine Negative     Creatinine, Random Ur 25.0 15.0 - 325.0 mg/dL    Toxicology Information SEE COMMENT    POCT BREATH ALCOHOL TEST    Collection Time: 02/06/19 10:59 AM   Result Value Ref Range    Breath Alcohol 0.000    VITAMIN B12    Collection Time: 02/07/19  9:30 AM   Result Value Ref Range    Vitamin B-12 250 210 - 950 pg/mL   FOLATE    Collection Time: 02/07/19  9:30 AM   Result Value Ref Range    Folate 9.6 4.0 - 24.0 ng/mL   TSH    Collection Time: 02/07/19  9:30 AM   Result Value Ref Range    TSH 1.157 0.400 - 4.000 uIU/mL   COMPREHENSIVE METABOLIC PANEL    Collection Time: 02/07/19  9:30 AM   Result Value Ref Range    Sodium 137 136 - 145 mmol/L    Potassium 4.0 3.5 - 5.1 mmol/L    Chloride 108 95 - 110 mmol/L    CO2 20 (L) 23 - 29 mmol/L    Glucose 109 70 - 110 mg/dL    BUN, Bld 8 6 - 20 mg/dL    Creatinine 0.8 0.5 - 1.4 mg/dL    Calcium 9.3 8.7 - 10.5 mg/dL    Total Protein 6.9 6.0 - 8.4 g/dL    Albumin 3.6 3.5 - 5.2 g/dL    Total Bilirubin 0.7 0.1 - 1.0 mg/dL    Alkaline  Phosphatase 51 (L) 55 - 135 U/L    AST 18 10 - 40 U/L    ALT 28 10 - 44 U/L    Anion Gap 9 8 - 16 mmol/L    eGFR if African American >60.0 >60 mL/min/1.73 m^2    eGFR if non African American >60.0 >60 mL/min/1.73 m^2   CBC W/ AUTO DIFFERENTIAL    Collection Time: 02/07/19  9:30 AM   Result Value Ref Range    WBC 6.40 3.90 - 12.70 K/uL    RBC 4.36 4.00 - 5.40 M/uL    Hemoglobin 14.0 12.0 - 16.0 g/dL    Hematocrit 42.7 37.0 - 48.5 %    MCV 98 82 - 98 fL    MCH 32.1 (H) 27.0 - 31.0 pg    MCHC 32.8 32.0 - 36.0 g/dL    RDW 12.8 11.5 - 14.5 %    Platelets 308 150 - 350 K/uL    MPV 9.9 9.2 - 12.9 fL    Immature Granulocytes 0.3 0.0 - 0.5 %    Gran # (ANC) 3.6 1.8 - 7.7 K/uL    Immature Grans (Abs) 0.02 0.00 - 0.04 K/uL    Lymph # 2.3 1.0 - 4.8 K/uL    Mono # 0.4 0.3 - 1.0 K/uL    Eos # 0.1 0.0 - 0.5 K/uL    Baso # 0.04 0.00 - 0.20 K/uL    nRBC 0 0 /100 WBC    Gran% 56.8 38.0 - 73.0 %    Lymph% 35.2 18.0 - 48.0 %    Mono% 5.8 4.0 - 15.0 %    Eosinophil% 1.3 0.0 - 8.0 %    Basophil% 0.6 0.0 - 1.9 %    Differential Method Automated    GAMMA GT    Collection Time: 02/07/19  9:30 AM   Result Value Ref Range    GGT 12 8 - 55 U/L   POCT BREATH ALCOHOL TEST    Collection Time: 02/07/19 11:07 AM   Result Value Ref Range    Breath Alcohol 0.000    POCT BREATH ALCOHOL TEST    Collection Time: 02/08/19 11:16 AM   Result Value Ref Range    Breath Alcohol 0.000    POCT BREATH ALCOHOL TEST    Collection Time: 02/08/19 11:17 AM   Result Value Ref Range    Breath Alcohol 0.000      ASSESSMENT:     Glo Flores is a 48 y.o. female with a past psychiatric history of alcohol use disorder, who presented to [facility] due to same.      Ms. Flores has had significant dysfunction from alcohol use disorder causing occupational, social, and interpersonal dysfunction and failed attempts to maintain sobriety and complex withdrawal syndrome who has now completed residential rehab and would benefit from continued care in a supportive setting.  Pt  "hyperverbal throughout interview 2/1, unclear if demonstrating evidence of hypomania vs personality/personal norm. Today talkative, but interruptable, describes being a talker, often talking to "fill the air" at baseline. Pt denies any current symptom of wilma.  Will continue to monitor closely.  Pt requires IOP level of care due to interpersonal and occupation dysfunction and negative health effects from alcohol use in addition to lack of clarity regarding primary psychiatric illness.     IMPRESSION  Alcohol use disorder, severe, in early remission   Hx of depression, unspecified, in remission   Hx OCPD    PLAN:     · Continue patient on ABU protocol.  · Breathalyzer and urine toxicology daily.  · VS daily x 3 days.  · CBC, CMP, GGT, B12/Folate TSH back today, all WNL  · Patient counseled on abstinence from alcohol.     Medications: Continue current medications.  · Trazodone 50mg po qdaily (since 11/2018) off label for insomnia   · Prozac 20mg po qdaily off label for perimenopausal symptoms per ob/gyn   · Metoprolol 12.5mg po qdaily for HTN  · For Alcohol Use Disorder, discussed naltrexone for cravings, pt declines at this time but will revisit, ramona as stakes are high regarding consequences with relapse and RNP     Status: Continue treatment on ABU     Nadira Juarez MD  Addiction Psychiatry Fellow  "

## 2019-02-08 NOTE — PLAN OF CARE
02/08/19 1300   Activity/Group Therapy Checklist   Group Goals/Reflection  (Identifying Values)   Attendance Attended   Follows Direction Followed directions   Group Interactions/Observations Interacted appropriately   Affect/Mood Range Normal range   Affect/Mood Display Appropriate   Goal Progression Progressing

## 2019-02-08 NOTE — PLAN OF CARE
02/08/19 1400   Activity/Group Therapy Checklist   Group Goals/Reflection   Attendance Attended   Follows Direction Followed directions   Group Interactions/Observations Interacted appropriately   Affect/Mood Range Normal range   Affect/Mood Display Appropriate   Goal Progression Progressing

## 2019-02-09 NOTE — PROGRESS NOTES
Group Psychotherapy (PhD/LCSW)    Site: [facility]    Clinical status of patient: Intensive Outpatient Program (IOP)    Date: 2/8/2019    Group Focus: Psychodynamic Group Psychotherapy    Length of service: 90644 - 45-50 minutes    Number of patients in attendance: 7    Referred by: Addictive Behavior Unit Treatment Team    Target symptoms: Alcohol Abuse    Patient's response to treatment: Active Listening and Self-disclosure    Progress toward goals: Progressing adequately    Interval History: Discussed feeling reassured, grateful, and relieved by the welcome she got when she visited her job site and was told she was missed and they were looking forward to her return.     Diagnosis: alcohol use disorder, severe, dependence    Plan: Continue treatment on ABU

## 2019-02-11 ENCOUNTER — HOSPITAL ENCOUNTER (OUTPATIENT)
Dept: PSYCHIATRY | Facility: HOSPITAL | Age: 49
Discharge: HOME | End: 2019-02-11
Attending: PSYCHIATRY & NEUROLOGY
Payer: MEDICAID

## 2019-02-11 VITALS — SYSTOLIC BLOOD PRESSURE: 113 MMHG | HEART RATE: 86 BPM | DIASTOLIC BLOOD PRESSURE: 73 MMHG | RESPIRATION RATE: 16 BRPM

## 2019-02-11 DIAGNOSIS — F10.20 ALCOHOL USE DISORDER, SEVERE, DEPENDENCE: Primary | ICD-10-CM

## 2019-02-11 PROCEDURE — 90853 GROUP PSYCHOTHERAPY: CPT

## 2019-02-11 PROCEDURE — 80307 DRUG TEST PRSMV CHEM ANLYZR: CPT

## 2019-02-11 PROCEDURE — 90853 GROUP PSYCHOTHERAPY: CPT | Performed by: SOCIAL WORKER

## 2019-02-11 PROCEDURE — 90853 PR GROUP PSYCHOTHERAPY: ICD-10-PCS | Mod: 59,HP,HB, | Performed by: PSYCHOLOGIST

## 2019-02-11 PROCEDURE — 90853 GROUP PSYCHOTHERAPY: CPT | Mod: HP,HB,, | Performed by: PSYCHOLOGIST

## 2019-02-11 NOTE — PROGRESS NOTES
Group Psychotherapy (PhD/LCSW)    Site: [facility]    Clinical status of patient: Intensive Outpatient Program (IOP)    Date: 2/11/2019    Group Focus: Psychodynamic Group Psychotherapy    Length of service: 10948 - 45-50 minutes    Number of patients in attendance: 9    Referred by: Addictive Behavior Unit Treatment Team    Target symptoms: Alcohol Abuse    Patient's response to treatment: Active Listening and Self-disclosure    Progress toward goals: Progressing adequately    Interval History: Pt shared her story with two new group members.      Diagnosis: alcohol use disorder, severe, dependence    Plan: Continue treatment on ABU

## 2019-02-11 NOTE — PLAN OF CARE
02/11/19 1000   Activity/Group Therapy Checklist   Group Goals/Reflection  (miracle question   )   Attendance Attended   Follows Direction Followed directions   Group Interactions/Observations Interacted appropriately   Affect/Mood Range Normal range   Affect/Mood Display Appropriate   Goal Progression Progressing

## 2019-02-11 NOTE — PROGRESS NOTES
Group Psychotherapy (PhD/LCSW)    Site: [facility]    Clinical status of patient: Intensive Outpatient Program (IOP)    Date: 2/11/2019    Group Focus:Communication Skills       Length of service: 55828 - 45-50 minutes    Number of patients in attendance: 11    Referred by: Addictive Behavior Unit Treatment Team    Target symptoms: Alcohol Abuse    Patient's response to treatment: Active Listening and Self-disclosure    Progress toward goals: Progressing adequately    Interval History: Discussed basic communications skills (I-messages and Reflective Listening). Illustrated through modeling and role play how to interact with I-messages and Reflective Listening and demonstrated their value for enhancing communication and resolving conflict.       Diagnosis: alcohol use disorder, severe, dependence    Plan: Continue treatment on ABU

## 2019-02-12 ENCOUNTER — HOSPITAL ENCOUNTER (OUTPATIENT)
Dept: PSYCHIATRY | Facility: HOSPITAL | Age: 49
Discharge: HOME | End: 2019-02-12
Attending: PSYCHIATRY & NEUROLOGY
Payer: MEDICAID

## 2019-02-12 DIAGNOSIS — F10.20 ALCOHOL USE DISORDER, SEVERE, DEPENDENCE: Primary | ICD-10-CM

## 2019-02-12 LAB — BREATH ALCOHOL: 0

## 2019-02-12 PROCEDURE — 90853 GROUP PSYCHOTHERAPY: CPT | Performed by: SOCIAL WORKER

## 2019-02-12 PROCEDURE — 90853 GROUP PSYCHOTHERAPY: CPT | Mod: HP,HB,, | Performed by: PSYCHOLOGIST

## 2019-02-12 PROCEDURE — 90853 PR GROUP PSYCHOTHERAPY: ICD-10-PCS | Mod: HP,HB,, | Performed by: PSYCHOLOGIST

## 2019-02-12 PROCEDURE — 90853 GROUP PSYCHOTHERAPY: CPT

## 2019-02-12 PROCEDURE — 99232 PR SUBSEQUENT HOSPITAL CARE,LEVL II: ICD-10-PCS | Mod: HP,HB,, | Performed by: PSYCHIATRY & NEUROLOGY

## 2019-02-12 PROCEDURE — 99232 SBSQ HOSP IP/OBS MODERATE 35: CPT | Mod: HP,HB,, | Performed by: PSYCHIATRY & NEUROLOGY

## 2019-02-12 NOTE — PROGRESS NOTES
"  2019 5:28 PM  Name: Glo Flores  : 1970  Start Date: 19    ABU Intensive Outpatient Program   Progress Note    Status: Intensive Outpatient Program (IOP)    HPI:  Glo Flores is a 48 y.o. female with a past psychiatric history of alcohol use, who presented to ABU IOP due to same.      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 States that from age [age] had a partner that didn't drink etoh and neither did she, then their relationship ended. Mentions she was isolated during that time in her life from others. After that relationship ended, she made more social connections, developed a close group of friends, and her support system improved. She also notes that she began to have a glass of wine every so often, started going out with friends.      Prior to , didn't drink except socially from age [age], about 3-4 glasses of wine a week, slowly increased over time.  At work in , she experienced stress due to change in her supervisor. She felt her work performance remained the same, though in contrast to assessments with previous supervisor, she received negative evaluations. This was a large source of dissatisfaction and distress for her in the workplace.   Went on a cruise with her friends, met her current partner who drank heavily; per patient he has a problem with alcohol as well and has since stopped drinking.   Reports these two events correlated with when she started drinking more. 2 bottles of wine per night, though then noticed she would drink whatever was available.      She notes she began to experience dysfunction during that time, and friends told her she needed to stop drinking. 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 Though states that she was "too far in" and started reporting to " another supervisor.      End of July 2018 she was suspended from work due to issues with penmanship (due to tremulousness), missed work time and attendance; that day reported herself to the board of nursing. End of September went for her evaluation at [facility] addiction center and was recommended residential treatment. Started going to AA on [address] since beginning of September, then went to rehab 11/19/19.   Was able to stop drinking during interval from hospitalization to rehab admission by going to AA daily.      Was treated in the hospital with DTs and hypokalemia in 8/2018 when she tried to stop drinking, though had alcohol withdrawal, so presented to hospital tremulousness. Last drink 8/30/2018, was in hospital on morning of admission. Talks about being under PEC at the hospital, has vague memories of that time. Parents visited with her then, she reports her family is supportive.      Talks about a higher power during interview, and friends who offered her help; these are sources of strength.      Remarks that friends noticed that she was drinking more, isolating, tried to tell her to stop but she wouldn't listen. Impacted her worse performance, if she didn't feel like going she wouldn't. Wouldn't drink before work, though did call in sick once when she was intoxicated and went back to sleep. Had previously tried to stop drinking, though with withdrawal symptoms she would try to cut down and ended up drinking more again.      Did pay cash for rehab though not financial stressed presently.      Patient reports she completed program at [facility], believes she benefited from it greatly. Her partner stopped drinking the same day that she did, when she entered the hospital for withdrawal. He is also attending AA, though didn't go to rehab and doesn't yet have a sponsor; he is supportive.      Patient reports she experienced feeling anhedonic in the past, though this has since resolved; she is on treatment  with prozac that her ob/gyn started when she had perimenopausal symptoms and reports it has been well tolerated. Additionally started trazodone during rehab stay, helped her sleep well. Admits that she has dealt with worries about the next day, particularly when trying to sleep and some social anxiety though doesn't experience debilitating symptoms.    SUBJECTIVE:     Interval Hx:  1/28/19 Ms. Flores reports that she has been well, denies SE to medications or cravings. Continues to attend AA daily, changed group due to timing of IOP and notes that new group is particularly crowded though did not find experience off putting.     Toxicology Screen: 1/28 etoh breath test negative, etoh biomarkers in process, utox negative   Medication Side Effects: None  Cravings: no  No other acute psychiatric issues reported at this time.    1/30/19 Reports she has been well, AA daily, no SE to medications. No cravings reported.     Toxicology Screen: 1/28 etoh breath test negative, etoh biomarkers negative, utox negative; 1/30 breath test negative, utox in process   Medication Side Effects: None  Cravings: no  No other acute psychiatric issues reported at this time.    2/1/19  Pt hyperverbal, laughing throughout interview.  Pt reports that she is doing well today.  Recounts events preceding her admission to IOP program in detail.  Endorses good sleep, good appetite.  Denies racing thoughts or increased energy.  Attending AA daily.  Denies SE to medication.  Denies cravings.  Wishes to continue current medication regimen.      Toxicology Screen: UDS 1/30 negative, breath alcohol 2/1 negative, UDS 2/1 negative  Medication Side Effects: denies  Cravings: denies  No other acute psychiatric issues reported at this time.    2/4/19   Reports having a good weekend. Got some alone time at home, watched TV, denied urges to drink. Sleeping well. Mood has been good. One of her tests from [facility] came back as positive, was reportedly due to  fluoxetine, needed updated med list for RNP. Going to meetings at [facility], has gotten a sponsor. Feeling eager to get back to work but acknowledges she needs to prioritize sobriety.    Toxicology Screen: UDS 2/4 negative, breath alcohol 2/4 negative, UDS 2/1 negative  Medication Side Effects: denies  Cravings: denies  No other acute psychiatric issues reported at this time.    2/6/19  Partner:  Family day was yesterday and Kellie boyfriend came to the session yesterday. She and her boyfriend attend AA together and they are maintaining their sobriety. She mentioned that she has learned from mistakes of past relationships and is working on not repeating the same mistakes with her current partner.    Work:  She is taking steps to get back to work. Speaking with future supervisor to work out her starting schedule. She needs to find out what the RNP requires. Her meetings for RNP will be Monday afternoons at [facility]. 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 We discussed her tendency to procrastinate and she is working on improving this trait.    Mood: Doing great.  Sleep: Sleeping well. In bed by 8-9pm last night  Appetite: Unchanged    Toxicology Screen: UDS 2/4 negative, breath alcohol 2/5 negative, UDS 2/1 negative  Medication Side Effects: denies  Cravings: denies. Sometimes Glo is reminded of drinking when she goes through the aisle at [employer], so she tries her best to ignore this.  No other acute psychiatric issues reported at this time.      2/8/19  Reports doing well. Met with supervisor at [facility], discussed working prn for first 6 months of her RNP contract. Feels good about this plan. Saw a lot of colleagues, reports reception from them was universally positive. Mood is good, sleeping well. Denies cravings. Reports that meeting with supervisor did bring up what she had done when she was  "using, and doesn't want to go back to that.   Discussed naltrexone, Glo declines this treatment at this point, says she doesn't have any desire to use, doesn't really see a role for the medication at this point. Discussed how it is something to keep on the table as an option moving forward.     Toxicology Screen: UDS 2/8 pending, breath alcohol today negative, UDS 2/6 negative  Medication Side Effects: denies  Cravings: denies.   No other acute psychiatric issues reported at this time.    2/12/19  Reports doing well, had a low key weekend. Going to meetings regularly, has a sponsor, also has connected with other women in the program. Going to speak at [employer] next week. Denies cravings. Working on getting back to work, plans to work prn shifts 3 days/week. Thinks that this will be a good set up for her. Groups going well, d/c date is next Thursday.      Toxicology Screen: UDS 2/11 negative, breath alcohol today negative, UDS 2/8 negative  Medication Side Effects: denies  Cravings: denies.   No other acute psychiatric issues reported at this time.    Scheduled Meds:   No current outpatient medications on file prior to encounter.     No current facility-administered medications on file prior to encounter.      Allergies:  Patient has no known allergies.    Psychiatric Review Of Systems:  Denies    Medical ROS:  See H&P dated 1/25/19 for ROS.     OBJECTIVE:     Vital Signs (Most Recent):  There were no vitals filed for this visit.    Mental Status Exam:  Appearance: unremarkable, age appropriate, casual clothing   Level of Consciousness: awake   Behavior/Cooperation: friendly and cooperative  Psychomotor: unremarkable   Speech: normal tone, talkative but interruptable, normal pitch, normal volume  Language: english, fluid  Orientation: person, place, situation, time/date  Attention Span/Concentration: intact   Memory: intact to conversation  Mood: "good"  Affect: mood congruent, reactive  Thought Process: linear, " normal and logical  Associations: normal and logical  Thought Content: normal, no suicidality, no homicidality, delusions, or paranoia  Fund of Knowledge: Aware of current events  Abstraction: proverbs were abstract on initial assessment, did not re-assess   Insight: good  Judgment: good    Laboratory:  Recent Results (from the past 168 hour(s))   Toxicology screen, urine    Collection Time: 02/06/19 10:59 AM   Result Value Ref Range    Alcohol, Urine <10 <10 mg/dL    Benzodiazepines Negative     Methadone metabolites Negative     Cocaine (Metab.) Negative     Opiate Scrn, Ur Negative     Barbiturate Screen, Ur Negative     Amphetamine Screen, Ur Negative     THC Negative     Phencyclidine Negative     Creatinine, Random Ur 25.0 15.0 - 325.0 mg/dL    Toxicology Information SEE COMMENT    POCT BREATH ALCOHOL TEST    Collection Time: 02/06/19 10:59 AM   Result Value Ref Range    Breath Alcohol 0.000    VITAMIN B12    Collection Time: 02/07/19  9:30 AM   Result Value Ref Range    Vitamin B-12 250 210 - 950 pg/mL   FOLATE    Collection Time: 02/07/19  9:30 AM   Result Value Ref Range    Folate 9.6 4.0 - 24.0 ng/mL   TSH    Collection Time: 02/07/19  9:30 AM   Result Value Ref Range    TSH 1.157 0.400 - 4.000 uIU/mL   COMPREHENSIVE METABOLIC PANEL    Collection Time: 02/07/19  9:30 AM   Result Value Ref Range    Sodium 137 136 - 145 mmol/L    Potassium 4.0 3.5 - 5.1 mmol/L    Chloride 108 95 - 110 mmol/L    CO2 20 (L) 23 - 29 mmol/L    Glucose 109 70 - 110 mg/dL    BUN, Bld 8 6 - 20 mg/dL    Creatinine 0.8 0.5 - 1.4 mg/dL    Calcium 9.3 8.7 - 10.5 mg/dL    Total Protein 6.9 6.0 - 8.4 g/dL    Albumin 3.6 3.5 - 5.2 g/dL    Total Bilirubin 0.7 0.1 - 1.0 mg/dL    Alkaline Phosphatase 51 (L) 55 - 135 U/L    AST 18 10 - 40 U/L    ALT 28 10 - 44 U/L    Anion Gap 9 8 - 16 mmol/L    eGFR if African American >60.0 >60 mL/min/1.73 m^2    eGFR if non African American >60.0 >60 mL/min/1.73 m^2   CBC W/ AUTO DIFFERENTIAL    Collection  Time: 02/07/19  9:30 AM   Result Value Ref Range    WBC 6.40 3.90 - 12.70 K/uL    RBC 4.36 4.00 - 5.40 M/uL    Hemoglobin 14.0 12.0 - 16.0 g/dL    Hematocrit 42.7 37.0 - 48.5 %    MCV 98 82 - 98 fL    MCH 32.1 (H) 27.0 - 31.0 pg    MCHC 32.8 32.0 - 36.0 g/dL    RDW 12.8 11.5 - 14.5 %    Platelets 308 150 - 350 K/uL    MPV 9.9 9.2 - 12.9 fL    Immature Granulocytes 0.3 0.0 - 0.5 %    Gran # (ANC) 3.6 1.8 - 7.7 K/uL    Immature Grans (Abs) 0.02 0.00 - 0.04 K/uL    Lymph # 2.3 1.0 - 4.8 K/uL    Mono # 0.4 0.3 - 1.0 K/uL    Eos # 0.1 0.0 - 0.5 K/uL    Baso # 0.04 0.00 - 0.20 K/uL    nRBC 0 0 /100 WBC    Gran% 56.8 38.0 - 73.0 %    Lymph% 35.2 18.0 - 48.0 %    Mono% 5.8 4.0 - 15.0 %    Eosinophil% 1.3 0.0 - 8.0 %    Basophil% 0.6 0.0 - 1.9 %    Differential Method Automated    GAMMA GT    Collection Time: 02/07/19  9:30 AM   Result Value Ref Range    GGT 12 8 - 55 U/L   POCT BREATH ALCOHOL TEST    Collection Time: 02/07/19 11:07 AM   Result Value Ref Range    Breath Alcohol 0.000    Toxicology screen, urine    Collection Time: 02/08/19 11:16 AM   Result Value Ref Range    Alcohol, Urine <10 <10 mg/dL    Benzodiazepines Negative     Methadone metabolites Negative     Cocaine (Metab.) Negative     Opiate Scrn, Ur Negative     Barbiturate Screen, Ur Negative     Amphetamine Screen, Ur Negative     THC Negative     Phencyclidine Negative     Creatinine, Random Ur 27.0 15.0 - 325.0 mg/dL    Toxicology Information SEE COMMENT    POCT BREATH ALCOHOL TEST    Collection Time: 02/08/19 11:16 AM   Result Value Ref Range    Breath Alcohol 0.000    POCT BREATH ALCOHOL TEST    Collection Time: 02/08/19 11:17 AM   Result Value Ref Range    Breath Alcohol 0.000    Toxicology screen, urine    Collection Time: 02/11/19 10:56 AM   Result Value Ref Range    Alcohol, Urine <10 <10 mg/dL    Benzodiazepines Negative     Methadone metabolites Negative     Cocaine (Metab.) Negative     Opiate Scrn, Ur Negative     Barbiturate Screen, Ur Negative  "    Amphetamine Screen, Ur Negative     THC Negative     Phencyclidine Negative     Creatinine, Random Ur 28.0 15.0 - 325.0 mg/dL    Toxicology Information SEE COMMENT    POCT BREATH ALCOHOL TEST    Collection Time: 02/11/19 11:09 AM   Result Value Ref Range    Breath Alcohol 0.000      ASSESSMENT:     Glo Flores is a 48 y.o. female with a past psychiatric history of alcohol use disorder, who presented to ABU IOP due to same.      Ms. Flores has had significant dysfunction from alcohol use disorder causing occupational, social, and interpersonal dysfunction and failed attempts to maintain sobriety and complex withdrawal syndrome who has now completed residential rehab and would benefit from continued care in a supportive setting.  Pt hyperverbal throughout interview 2/1, unclear if demonstrating evidence of hypomania vs personality/personal norm. On subsequent interviews, is talkative, but interruptable, describes being a talker, often talking to "fill the air" at baseline. Pt denies any current symptom of wilma.  Will continue to monitor closely.  Pt requires IOP level of care due to interpersonal and occupation dysfunction and negative health effects from alcohol use in addition to lack of clarity regarding primary psychiatric illness.     IMPRESSION  Alcohol use disorder, severe, in early remission   Hx of depression, unspecified, in remission   Hx OCPD    PLAN:     · Continue patient on ABU protocol.  · Breathalyzer and urine toxicology daily.  · VS daily x 3 days.  · Labs last week all WNL  · Patient counseled on abstinence from alcohol.     Medications: Continue current medications.  · Trazodone 50mg po qdaily (since 11/2018) off label for insomnia   · Prozac 20mg po qdaily off label for perimenopausal symptoms per ob/gyn   · Metoprolol 12.5mg po qdaily for HTN  · For Alcohol Use Disorder, discussed naltrexone for cravings, pt declines at this time but will revisit, ramona as stakes are high regarding " consequences with relapse and RNP     Status: Continue treatment on ABU     Nadria Juarez MD  Addiction Psychiatry Fellow

## 2019-02-12 NOTE — PROGRESS NOTES
Group Psychotherapy (PhD/LCSW)    Site: [facility]    Clinical status of patient: Intensive Outpatient Program (IOP)    Date: 2/12/2019    Group Focus: DBT-Based Group Therapy    Length of service: 68314 - 45-50 minutes    Number of patients in attendance: 11    Referred by: Addictive Behavior Unit Treatment Team    Target symptoms: Alcohol Abuse    Patient's response to treatment: Active Listening; Self-disclosure    Progress toward goals: Progressing adequately    Interval History: Session focus was Emotion Regulation:  Understanding Emotions.  Patients were encouraged to understand what their emotions do for them (motivate them to action, communicate to themselves and others).      Diagnosis: Alcohol Use Disorder, severe dependence     Plan: Continue treatment on ABU

## 2019-02-12 NOTE — PROGRESS NOTES
Group Psychotherapy (PhD/LCSW)    Site: [facility]    Clinical status of patient: Intensive Outpatient Program (IOP)    Date: 2/12/2019    Group Focus: Disease Model of Addiction    Length of service: 01544 - 45-50 minutes    Number of patients in attendance: 7    Referred by: Addictive Behavior Unit Treatment Team    Target symptoms: Alcohol Abuse    Patient's response to treatment: Active Listening and Self-disclosure    Progress toward goals: Progressing adequately    Interval History: Discussed the basic neuropsychological concepts of the Disease Model of Addiction and their relationship to the phenimena of addiction (eg, powerlessness, euphoric recall, cravings, tolerance, relapse, recovery, etc). Noted the value of understanding the Disease Model for sustaining long-term recovery.      Diagnosis: alcohol use disorder, severe, dependence    Plan: Continue treatment on ABU

## 2019-02-12 NOTE — PLAN OF CARE
02/12/19 1400   Activity/Group Therapy Checklist   Group Relapse Prevention   Attendance Attended   Follows Direction Followed directions   Group Interactions/Observations Interacted appropriately   Affect/Mood Range Normal range   Affect/Mood Display Appropriate   Goal Progression Progressing

## 2019-02-12 NOTE — PROGRESS NOTES
Group Psychotherapy (PhD/LCSW)    Site: [facility]    Clinical status of patient: Intensive Outpatient Program (IOP)    Date: 2/12/2019    Group Focus: Psychodynamic Group Psychotherapy    Length of service: 90220 - 45-50 minutes    Number of patients in attendance: 8    Referred by: Addictive Behavior Unit Treatment Team    Target symptoms: Alcohol Abuse    Patient's response to treatment: Active Listening and Self-disclosure    Progress toward goals: Progressing adequately    Interval History: Discussed seeing [place] counselor at . 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    Diagnosis: alcohol use disorder, severe, dependence    Plan: Continue treatment on ABU

## 2019-02-12 NOTE — PLAN OF CARE
02/11/19 1400   Activity/Group Therapy Checklist   Group Relapse Prevention   Attendance Attended   Follows Direction Followed directions   Group Interactions/Observations Interacted appropriately;Sharing;Supportive   Affect/Mood Range Normal range   Affect/Mood Display Appropriate   Goal Progression Progressing

## 2019-02-13 ENCOUNTER — HOSPITAL ENCOUNTER (OUTPATIENT)
Dept: PSYCHIATRY | Facility: HOSPITAL | Age: 49
Discharge: HOME | End: 2019-02-13
Attending: PSYCHIATRY & NEUROLOGY
Payer: MEDICAID

## 2019-02-13 VITALS — RESPIRATION RATE: 16 BRPM | SYSTOLIC BLOOD PRESSURE: 125 MMHG | DIASTOLIC BLOOD PRESSURE: 67 MMHG | HEART RATE: 85 BPM

## 2019-02-13 DIAGNOSIS — F10.20 ALCOHOL USE DISORDER, SEVERE, DEPENDENCE: Primary | ICD-10-CM

## 2019-02-13 LAB
AMPHET+METHAMPHET UR QL: NEGATIVE
BARBITURATES UR QL SCN>200 NG/ML: NEGATIVE
BENZODIAZ UR QL SCN>200 NG/ML: NEGATIVE
BREATH ALCOHOL: 0
BZE UR QL SCN: NEGATIVE
CANNABINOIDS UR QL SCN: NEGATIVE
CREAT UR-MCNC: 93 MG/DL
ETHANOL UR-MCNC: <10 MG/DL
ETHYL GLUCURONIDE: NEGATIVE
METHADONE UR QL SCN>300 NG/ML: NEGATIVE
OPIATES UR QL SCN: NEGATIVE
PCP UR QL SCN>25 NG/ML: NEGATIVE
TOXICOLOGY INFORMATION: NORMAL

## 2019-02-13 PROCEDURE — 90853 GROUP PSYCHOTHERAPY: CPT

## 2019-02-13 PROCEDURE — 90853 PR GROUP PSYCHOTHERAPY: ICD-10-PCS | Mod: HP,HB,, | Performed by: PSYCHOLOGIST

## 2019-02-13 PROCEDURE — 90853 GROUP PSYCHOTHERAPY: CPT | Mod: HP,HB,, | Performed by: PSYCHOLOGIST

## 2019-02-13 PROCEDURE — 80307 DRUG TEST PRSMV CHEM ANLYZR: CPT

## 2019-02-13 PROCEDURE — 90853 GROUP PSYCHOTHERAPY: CPT | Performed by: SOCIAL WORKER

## 2019-02-13 NOTE — PLAN OF CARE
02/13/19 1400   Activity/Group Therapy Checklist   Group Music   Attendance Attended   Follows Direction Followed directions   Group Interactions/Observations Interacted appropriately   Affect/Mood Range Normal range   Affect/Mood Display Appropriate   Goal Progression Progressing

## 2019-02-13 NOTE — PROGRESS NOTES
Group Psychotherapy (PhD/LCSW)    Site: [facility]    Clinical status of patient: Intensive Outpatient Program (IOP)    Date: 2/13/2019    Group Focus: Psychodynamic Group Psychotherapy    Length of service: 60967 - 45-50 minutes    Number of patients in attendance: 8    Referred by: Addictive Behavior Unit Treatment Team    Target symptoms: Alcohol Abuse    Patient's response to treatment: Active Listening and Self-disclosure    Progress toward goals: Progressing adequately    Interval History: Discussed conversations with the board and with her employer regarding return to work. Continues to be active in AA.    Diagnosis: alcohol use disorder, severe, dependence    Plan: Continue treatment on ABU

## 2019-02-13 NOTE — PROGRESS NOTES
Group Psychotherapy (PhD/LCSW)    Site: [facility]    Clinical status of patient: Intensive Outpatient Program (IOP)    Date: 2/13/2019    Group Focus: DBT-Based Group Therapy    Length of service: 55645 - 45-50 minutes    Number of patients in attendance: 11    Referred by: Addictive Behavior Unit Treatment Team    Target symptoms: Alcohol Abuse    Patient's response to treatment: Active Listening; Self-disclosure    Progress toward goals: Progressing adequately    Interval History: Session focus was Emotion Regulation:  Opposite Action.  Patients identified action urges for each emotion and learned how to engage in opposite action when the emotions are not justified or unhelpful.     Diagnosis: Alcohol Use Disorder, severe dependence     Plan: Continue treatment on ABU

## 2019-02-13 NOTE — PATIENT CARE CONFERENCE
ABU Staffing:   Alcohol use Disorder, Severe, in early remission  Hx of depression, unspecified in remission  Hx OCPD          1. Pt is attending all groups    2. Pt is attending all meetings  3. Pt 's has minimally supportive family  4. Pt has completed spiritual assessment    5. Pt will present life story    6. Pt will present Step One assignment    7. Pt is exploring issues related to relapse  prevention; spirituality; stress management; improved communication skills; assertiveness training; poor self-esteem; disease concepts; cross addictions; and, work related issues    8. 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      Problem: Alcohol use Disorder, Severe, in early remission  Goal: Address in 12 step meetings and group and individual sessions    Objective Measure: participation in groups, self report, length of sobriety, and relapse prevention plan  Time: Prior to discharge    Progress: Pt is attending groups and sessions       Problem: Hx of depression, unspecified in remission  Goal: Address in 12 step meetings and group and individual sessions    Objective Measure: participation in groups, self report, length of sobriety, and relapse prevention plan  Time: Prior to discharge    Progress: Pt is attending groups and sessions       Problem: Hx OCPD  Goal: Address in 12 step meetings and group and individual sessions    Objective Measure: participation in groups, self report, length of sobriety, and relapse prevention plan  Time: Prior to discharge    Progress: Pt is attending groups and sessions           Staff members present:    MD Dr. Ann Carranza MD Fellow  MD Jessica Resident  Danyell Alonso.SALAS Sanchez, [facility]  Kai Valdez, [facility]  Della Davis RN

## 2019-02-14 ENCOUNTER — HOSPITAL ENCOUNTER (OUTPATIENT)
Dept: PSYCHIATRY | Facility: HOSPITAL | Age: 49
Discharge: HOME | End: 2019-02-14
Attending: PSYCHIATRY & NEUROLOGY
Payer: MEDICAID

## 2019-02-14 DIAGNOSIS — F10.20 ALCOHOL USE DISORDER, SEVERE, DEPENDENCE: Primary | ICD-10-CM

## 2019-02-14 LAB — BREATH ALCOHOL: 0

## 2019-02-14 PROCEDURE — 90853 GROUP PSYCHOTHERAPY: CPT

## 2019-02-14 PROCEDURE — 90853 GROUP PSYCHOTHERAPY: CPT | Performed by: SOCIAL WORKER

## 2019-02-14 PROCEDURE — 90853 PR GROUP PSYCHOTHERAPY: ICD-10-PCS | Mod: HP,HB,, | Performed by: PSYCHOLOGIST

## 2019-02-14 PROCEDURE — 90853 GROUP PSYCHOTHERAPY: CPT | Mod: HP,HB,, | Performed by: PSYCHOLOGIST

## 2019-02-14 NOTE — PROGRESS NOTES
Group Psychotherapy (PhD/LCSW)    Site: [facility]    Clinical status of patient: Intensive Outpatient Program (IOP)    Date: 2/14/2019    Group Focus: DBT-Based Group Therapy    Length of service: 11198 - 45-50 minutes    Number of patients in attendance: 10    Referred by: Addictive Behavior Unit Treatment Team    Target symptoms: Alcohol Abuse    Patient's response to treatment: Active Listening; Self-disclosure    Progress toward goals: Progressing adequately    Interval History: Session focus was Emotion Regulation:  ABC PLEASE.  Patients were encouraged to reduce vulnerability to distress by accumulating positive emotions, building mastery, coping ahead, and by attending to physical well-being.  Each patient identified a way to accumulate positive emotions and build mastery.    Diagnosis: Alcohol Use Disorder, severe dependence     Plan: Continue treatment on ABU

## 2019-02-14 NOTE — PROGRESS NOTES
Group Psychotherapy (PhD/LCSW)    Site: [facility]    Clinical status of patient: Intensive Outpatient Program (IOP)    Date: 2/14/2019    Group Focus: Psychodynamic Group Psychotherapy    Length of service: 19921 - 45-50 minutes    Number of patients in attendance: 8    Referred by: Addictive Behavior Unit Treatment Team    Target symptoms: Alcohol Abuse    Patient's response to treatment: Active Listening and Self-disclosure    Progress toward goals: Progressing adequately    Interval History: Shared her story with new group member.    Diagnosis: alcohol use disorder, severe, dependence    Plan: Continue treatment on ABU

## 2019-02-14 NOTE — PLAN OF CARE
02/14/19 1000   Activity/Group Therapy Checklist   Group Other (Comments)  (art therapy )   Attendance Attended   Follows Direction Followed directions   Group Interactions/Observations Interacted appropriately   Affect/Mood Range Normal range   Affect/Mood Display Appropriate   Goal Progression Progressing

## 2019-02-14 NOTE — PLAN OF CARE
02/14/19 1400   Activity/Group Therapy Checklist   Group Relapse Prevention  (Step Work )   Attendance Attended   Follows Direction Followed directions   Group Interactions/Observations Interacted appropriately   Affect/Mood Range Normal range   Affect/Mood Display Appropriate   Goal Progression Progressing

## 2019-02-15 ENCOUNTER — HOSPITAL ENCOUNTER (OUTPATIENT)
Dept: PSYCHIATRY | Facility: HOSPITAL | Age: 49
Discharge: HOME | End: 2019-02-15
Attending: PSYCHIATRY & NEUROLOGY
Payer: MEDICAID

## 2019-02-15 VITALS — SYSTOLIC BLOOD PRESSURE: 108 MMHG | HEART RATE: 93 BPM | DIASTOLIC BLOOD PRESSURE: 74 MMHG | RESPIRATION RATE: 1 BRPM

## 2019-02-15 DIAGNOSIS — F10.20 ALCOHOL USE DISORDER, SEVERE, DEPENDENCE: Primary | ICD-10-CM

## 2019-02-15 PROCEDURE — 90853 PR GROUP PSYCHOTHERAPY: ICD-10-PCS | Mod: 59,HB,HP, | Performed by: PSYCHOLOGIST

## 2019-02-15 PROCEDURE — 80307 DRUG TEST PRSMV CHEM ANLYZR: CPT

## 2019-02-15 PROCEDURE — 90853 GROUP PSYCHOTHERAPY: CPT | Performed by: SOCIAL WORKER

## 2019-02-15 PROCEDURE — 90853 GROUP PSYCHOTHERAPY: CPT

## 2019-02-15 PROCEDURE — 90853 GROUP PSYCHOTHERAPY: CPT | Mod: HP,HB,, | Performed by: PSYCHOLOGIST

## 2019-02-15 NOTE — PLAN OF CARE
02/15/19 1400   Activity/Group Therapy Checklist   Group Relapse Prevention  (Step Work )   Attendance Attended   Follows Direction Followed directions   Group Interactions/Observations Interacted appropriately   Affect/Mood Range Normal range   Affect/Mood Display Appropriate   Goal Progression Progressing

## 2019-02-15 NOTE — PROGRESS NOTES
Group Psychotherapy (PhD/LCSW)    Site: [facility]    Clinical status of patient: Intensive Outpatient Program (IOP)    Date: 2/15/2019    Group Focus: Psychodynamic Group Psychotherapy    Length of service: 61959 - 45-50 minutes    Number of patients in attendance: 8    Referred by: Addictive Behavior Unit Treatment Team    Target symptoms: Alcohol Abuse    Patient's response to treatment: Active Listening and Self-disclosure    Progress toward goals: Progressing adequately    Interval History: Discussed the issue of how much to reveal about oneself in AA meetings and ABU groups. Pt felt it was important to be as open as possible. She expects to have a quiet weekend.     Diagnosis: alcohol use disorder, severe, dependence    Plan: Continue treatment on ABU

## 2019-02-15 NOTE — PLAN OF CARE
02/15/19 1000   Activity/Group Therapy Checklist   Group Goals/Reflection   Attendance Attended   Follows Direction Followed directions   Group Interactions/Observations Interacted appropriately   Affect/Mood Range Normal range   Affect/Mood Display Appropriate   Goal Progression Progressing

## 2019-02-15 NOTE — PROGRESS NOTES
Group Psychotherapy (PhD/LCSW)    Site: [facility]    Clinical status of patient: Intensive Outpatient Program (IOP)    Date: 2/15/2019    Group Focus: Stress Management       Length of service: 98383 - 45-50 minutes    Number of patients in attendance: 8    Referred by: Addictive Behavior Unit Treatment Team    Target symptoms: Alcohol Abuse    Patient's response to treatment: Active Listening and Self-disclosure    Progress toward goals: Progressing adequately    Interval History: Discussed the Relaxation Response technique for mediation: the procedure and its value. Group practiced the it for 5 minutes. Pt's response was positive.      Diagnosis: alcohol use disorder, severe, dependence    Plan: Continue treatment on ABU

## 2019-02-15 NOTE — PROGRESS NOTES
"  2/15/2019 5:28 PM  Name: Glo Flores  : 1970  Start Date: 19    ABU Intensive Outpatient Program   Progress Note    Status: Intensive Outpatient Program (IOP)    HPI:  Glo Flores is a 48 y.o. female with a past psychiatric history of alcohol use, who presented to ABU IOP due to same.      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 States that from age [age] had a partner that didn't drink etoh and neither did she, then their relationship ended. Mentions she was isolated during that time in her life from others. After that relationship ended, she made more social connections, developed a close group of friends, and her support system improved. She also notes that she began to have a glass of wine every so often, started going out with friends.      Prior to , didn't drink except socially from age [age], about 3-4 glasses of wine a week, slowly increased over time.  At work in , she experienced stress due to change in her supervisor. She felt her work performance remained the same, though in contrast to assessments with previous supervisor, she received negative evaluations. This was a large source of dissatisfaction and distress for her in the workplace.   Went on a cruise with her friends, met her current partner who drank heavily; per patient he has a problem with alcohol as well and has since stopped drinking.   Reports these two events correlated with when she started drinking more. 2 bottles of wine per night, though then noticed she would drink whatever was available.      She notes she began to experience dysfunction during that time, and friends told her she needed to stop drinking. 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 Though states that she was "too far in" and started reporting to " another supervisor.      End of July 2018 she was suspended from work due to issues with penmanship (due to tremulousness), missed work time and attendance; that day reported herself to the board of nursing. End of September went for her evaluation at [facility] addiction [facility] and was recommended residential treatment. Started going to AA on [address] since beginning of September, then went to rehab 11/19/19.   Was able to stop drinking during interval from hospitalization to rehab admission by going to AA daily.      Was treated in the hospital with DTs and hypokalemia in 8/2018 when she tried to stop drinking, though had alcohol withdrawal, so presented to hospital tremulousness. Last drink 8/30/2018, was in hospital on morning of admission. Talks about being under PEC at the hospital, has vague memories of that time. Parents visited with her then, she reports her family is supportive.      Talks about a higher power during interview, and friends who offered her help; these are sources of strength.      Remarks that friends noticed that she was drinking more, isolating, tried to tell her to stop but she wouldn't listen. Impacted her worse performance, if she didn't feel like going she wouldn't. Wouldn't drink before work, though did call in sick once when she was intoxicated and went back to sleep. Had previously tried to stop drinking, though with withdrawal symptoms she would try to cut down and ended up drinking more again.      Did pay cash for rehab though not financial stressed presently.      Patient reports she completed program at [place], believes she benefited from it greatly. Her partner stopped drinking the same day that she did, when she entered the hospital for withdrawal. He is also attending AA, though didn't go to rehab and doesn't yet have a sponsor; he is supportive.      Patient reports she experienced feeling anhedonic in the past, though this has since resolved; she is on treatment  with prozac that her ob/gyn started when she had perimenopausal symptoms and reports it has been well tolerated. Additionally started trazodone during rehab stay, helped her sleep well. Admits that she has dealt with worries about the next day, particularly when trying to sleep and some social anxiety though doesn't experience debilitating symptoms.    SUBJECTIVE:     Interval Hx:  1/28/19 Ms. Flores reports that she has been well, denies SE to medications or cravings. Continues to attend AA daily, changed group due to timing of IOP and notes that new group is particularly crowded though did not find experience off putting.     Toxicology Screen: 1/28 etoh breath test negative, etoh biomarkers in process, utox negative   Medication Side Effects: None  Cravings: no  No other acute psychiatric issues reported at this time.    1/30/19 Reports she has been well, AA daily, no SE to medications. No cravings reported.     Toxicology Screen: 1/28 etoh breath test negative, etoh biomarkers negative, utox negative; 1/30 breath test negative, utox in process   Medication Side Effects: None  Cravings: no  No other acute psychiatric issues reported at this time.    2/1/19  Pt hyperverbal, laughing throughout interview.  Pt reports that she is doing well today.  Recounts events preceding her admission to IOP program in detail.  Endorses good sleep, good appetite.  Denies racing thoughts or increased energy.  Attending AA daily.  Denies SE to medication.  Denies cravings.  Wishes to continue current medication regimen.      Toxicology Screen: UDS 1/30 negative, breath alcohol 2/1 negative, UDS 2/1 negative  Medication Side Effects: denies  Cravings: denies  No other acute psychiatric issues reported at this time.    2/4/19   Reports having a good weekend. Got some alone time at home, watched TV, denied urges to drink. Sleeping well. Mood has been good. One of her tests from [facility] came back as positive, was reportedly due to  fluoxetine, needed updated med list for RNP. Going to meetings at [facility], has gotten a sponsor. Feeling eager to get back to work but acknowledges she needs to prioritize sobriety.    Toxicology Screen: UDS 2/4 negative, breath alcohol 2/4 negative, UDS 2/1 negative  Medication Side Effects: denies  Cravings: denies  No other acute psychiatric issues reported at this time.    2/6/19  Partner:  Family day was yesterday and Kellie boyfriend came to the session yesterday. She and her boyfriend attend AA together and they are maintaining their sobriety. She mentioned that she has learned from mistakes of past relationships and is working on not repeating the same mistakes with her current partner.    Work:  She is taking steps to get back to work. Speaking with future supervisor to work out her starting schedule. She needs to find out what the RNP requires. Her meetings for RNP will be Monday afternoons at [facility]. 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 We discussed her tendency to procrastinate and she is working on improving this trait.    Mood: Doing great.  Sleep: Sleeping well. In bed by 8-9pm last night  Appetite: Unchanged    Toxicology Screen: UDS 2/4 negative, breath alcohol 2/5 negative, UDS 2/1 negative  Medication Side Effects: denies  Cravings: denies. Sometimes Glo is reminded of drinking when she goes through the aisle at [employer], so she tries her best to ignore this.  No other acute psychiatric issues reported at this time.      2/8/19  Reports doing well. Met with supervisor at [facility], discussed working prn for first 6 months of her RNP contract. Feels good about this plan. Saw a lot of colleagues, reports reception from them was universally positive. Mood is good, sleeping well. Denies cravings. Reports that meeting with supervisor did bring up what she had done when she was  using, and doesn't want to go back to that.   Discussed naltrexone, Glo declines this treatment at this point, says she doesn't have any desire to use, doesn't really see a role for the medication at this point. Discussed how it is something to keep on the table as an option moving forward.     Toxicology Screen: UDS 2/8 pending, breath alcohol today negative, UDS 2/6 negative  Medication Side Effects: denies  Cravings: denies.   No other acute psychiatric issues reported at this time.    2/12/19  Reports doing well, had a low key weekend. Going to meetings regularly, has a sponsor, also has connected with other women in the program. Going to speak at [employer] next week. Denies cravings. Working on getting back to work, plans to work prn shifts 3 days/week. Thinks that this will be a good set up for her. Groups going well, d/c date is next Thursday.      Toxicology Screen: UDS 2/11 negative, breath alcohol today negative, UDS 2/8 negative  Medication Side Effects: denies  Cravings: denies.   No other acute psychiatric issues reported at this time.    2/15/19  Pt reports that she is doing well.  She reports that she continues to attend meetings regularly.  Denies any substance use this week.  Denies cravings.  Tolerating scheduled medications without difficulty.  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  No complaints today.    Toxicology Screen: UDS 2/13 negative, breath alcohol 2/15 negative  Medication Side Effects: Denies  Cravings: Denies  No other acute psychiatric issues reported at this time.    Scheduled Meds:   No current outpatient medications on file prior to encounter.     No current facility-administered medications on file prior to encounter.      Allergies:  Patient has no known allergies.    Psychiatric Review Of Systems:  Denies    Medical ROS:  See H&P dated 1/25/19 for ROS.     OBJECTIVE:     Vital Signs (Most Recent):  Vitals:    02/15/19 1037   BP:  "108/74   Pulse: 93   Resp: (!) 1       Mental Status Exam:  Appearance: unremarkable, age appropriate, casual clothing   Level of Consciousness: awake   Behavior/Cooperation: friendly and cooperative  Psychomotor: unremarkable   Speech: normal tone, talkative but interruptable, normal pitch, normal volume  Language: english, fluid  Orientation: person, place, situation, time/date  Attention Span/Concentration: intact   Memory: intact to conversation  Mood: "good"  Affect: mood congruent, reactive  Thought Process: linear, normal and logical  Associations: normal and logical  Thought Content: normal, no suicidality, no homicidality, delusions, or paranoia  Fund of Knowledge: Aware of current events  Abstraction: proverbs were abstract on initial assessment, did not re-assess   Insight: good  Judgment: good    Laboratory:  Recent Results (from the past 168 hour(s))   Toxicology screen, urine    Collection Time: 02/11/19 10:56 AM   Result Value Ref Range    Alcohol, Urine <10 <10 mg/dL    Benzodiazepines Negative     Methadone metabolites Negative     Cocaine (Metab.) Negative     Opiate Scrn, Ur Negative     Barbiturate Screen, Ur Negative     Amphetamine Screen, Ur Negative     THC Negative     Phencyclidine Negative     Creatinine, Random Ur 28.0 15.0 - 325.0 mg/dL    Toxicology Information SEE COMMENT    Alcohol Biomarkers, urine    Collection Time: 02/11/19 10:56 AM   Result Value Ref Range    Ethyl Glucuronide NEGATIVE    POCT BREATH ALCOHOL TEST    Collection Time: 02/11/19 11:09 AM   Result Value Ref Range    Breath Alcohol 0.000    POCT BREATH ALCOHOL TEST    Collection Time: 02/12/19 11:09 AM   Result Value Ref Range    Breath Alcohol 0.000    Toxicology screen, urine    Collection Time: 02/13/19 11:43 AM   Result Value Ref Range    Alcohol, Urine <10 <10 mg/dL    Benzodiazepines Negative     Methadone metabolites Negative     Cocaine (Metab.) Negative     Opiate Scrn, Ur Negative     Barbiturate Screen, Ur " "Negative     Amphetamine Screen, Ur Negative     THC Negative     Phencyclidine Negative     Creatinine, Random Ur 93.0 15.0 - 325.0 mg/dL    Toxicology Information SEE COMMENT    POCT BREATH ALCOHOL TEST    Collection Time: 02/13/19 11:44 AM   Result Value Ref Range    Breath Alcohol 0.000    POCT BREATH ALCOHOL TEST    Collection Time: 02/14/19 11:12 AM   Result Value Ref Range    Breath Alcohol 0.000    POCT BREATH ALCOHOL TEST    Collection Time: 02/15/19 10:37 AM   Result Value Ref Range    Breath Alcohol 0.000      ASSESSMENT:     Glo Flores is a 48 y.o. female with a past psychiatric history of alcohol use disorder, who presented to ABU IOP due to same.      Ms. Flores has had significant dysfunction from alcohol use disorder causing occupational, social, and interpersonal dysfunction and failed attempts to maintain sobriety and complex withdrawal syndrome who has now completed residential rehab and would benefit from continued care in a supportive setting.  Pt hyperverbal throughout interview 2/1, unclear if demonstrating evidence of hypomania vs personality/personal norm. On subsequent interviews, is talkative, but interruptable, describes being a talker, often talking to "fill the air" at baseline. Pt denies any current symptom of wilma.  Will continue to monitor closely.  Pt requires IOP level of care due to interpersonal and occupation dysfunction and negative health effects from alcohol use in addition to lack of clarity regarding primary psychiatric illness.     IMPRESSION  Alcohol use disorder, severe, in early remission   Hx of depression, unspecified, in remission   Hx OCPD    PLAN:     · Continue patient on ABU protocol.  · Breathalyzer and urine toxicology daily.  · VS daily x 3 days.  · Labs last week all WNL  · Patient counseled on abstinence from alcohol.     Medications: Continue current medications.  · Trazodone 50mg po qdaily (since 11/2018) off label for insomnia   · Prozac 20mg po " qdaily off label for perimenopausal symptoms per ob/gyn   · Metoprolol 12.5mg po qdaily for HTN  · For Alcohol Use Disorder, discussed naltrexone for cravings, pt declines at this time but will revisit, ramona as stakes are high regarding consequences with relapse and RNP     Status: Continue treatment on ABU     Alexis Roberts MD  Psychiatry PGY-2  02/15/2019, 11:27 AM  Pager: [phone]

## 2019-02-18 ENCOUNTER — HOSPITAL ENCOUNTER (OUTPATIENT)
Dept: PSYCHIATRY | Facility: HOSPITAL | Age: 49
Discharge: HOME | End: 2019-02-18
Attending: PSYCHIATRY & NEUROLOGY
Payer: MEDICAID

## 2019-02-18 VITALS — DIASTOLIC BLOOD PRESSURE: 78 MMHG | HEART RATE: 91 BPM | SYSTOLIC BLOOD PRESSURE: 143 MMHG | RESPIRATION RATE: 16 BRPM

## 2019-02-18 DIAGNOSIS — F10.20 ALCOHOL USE DISORDER, SEVERE, DEPENDENCE: Primary | ICD-10-CM

## 2019-02-18 LAB
AMPHET+METHAMPHET UR QL: NEGATIVE
BARBITURATES UR QL SCN>200 NG/ML: NEGATIVE
BENZODIAZ UR QL SCN>200 NG/ML: NEGATIVE
BREATH ALCOHOL: 0
BZE UR QL SCN: NEGATIVE
CANNABINOIDS UR QL SCN: NEGATIVE
CREAT UR-MCNC: 73 MG/DL
ETHANOL UR-MCNC: <10 MG/DL
METHADONE UR QL SCN>300 NG/ML: NEGATIVE
OPIATES UR QL SCN: NEGATIVE
PCP UR QL SCN>25 NG/ML: NEGATIVE
TOXICOLOGY INFORMATION: NORMAL

## 2019-02-18 PROCEDURE — 80307 DRUG TEST PRSMV CHEM ANLYZR: CPT

## 2019-02-18 PROCEDURE — 90853 GROUP PSYCHOTHERAPY: CPT

## 2019-02-18 PROCEDURE — 90853 GROUP PSYCHOTHERAPY: CPT | Mod: HP,HB,, | Performed by: PSYCHOLOGIST

## 2019-02-18 PROCEDURE — 90853 PR GROUP PSYCHOTHERAPY: ICD-10-PCS | Mod: HP,HB,, | Performed by: PSYCHOLOGIST

## 2019-02-18 PROCEDURE — 90853 GROUP PSYCHOTHERAPY: CPT | Performed by: SOCIAL WORKER

## 2019-02-18 NOTE — PLAN OF CARE
02/18/19 1000   Activity/Group Therapy Checklist   Group Relapse Prevention  (1st Step)   Attendance Attended   Follows Direction Followed directions   Group Interactions/Observations Interacted appropriately;Sharing  (Presented first step in detail. )   Affect/Mood Range Normal range   Affect/Mood Display Appropriate   Goal Progression Progressing

## 2019-02-18 NOTE — PROGRESS NOTES
Group Psychotherapy (PhD/LCSW)    Site: [facility]    Clinical status of patient: Intensive Outpatient Program (IOP)    Date: 2/18/2019    Group Focus: Psychodynamic Group Psychotherapy    Length of service: 61112 - 45-50 minutes    Number of patients in attendance: 9    Referred by: Addictive Behavior Unit Treatment Team    Target symptoms: Alcohol Abuse    Patient's response to treatment: Active Listening and Self-disclosure    Progress toward goals: Progressing adequately    Interval History: Pt shared her story with a new member of the group. .     Diagnosis: alcohol use disorder, severe, dependence    Plan: Continue treatment on ABU

## 2019-02-18 NOTE — PROGRESS NOTES
Group Psychotherapy (PhD/LCSW)    Site: [facility]    Clinical status of patient: Intensive Outpatient Program (IOP)    Date: 2/18/2019    Group Focus: Communication Skills       Length of service: 73250 - 45-50 minutes    Number of patients in attendance: 12    Referred by: Addictive Behavior Unit Treatment Team    Target symptoms: Alcohol Abuse    Patient's response to treatment: Active Listening and Self-disclosure    Progress toward goals: Progressing adequately    Interval History: Discussed basic communication skills including: I-messages; Reflective Listening; and Contextual/Approach considerations. Illustrated the value of each through vignettes, modeling, and role play .     Diagnosis: alcohol use disorder, severe, dependence    Plan: Continue treatment on ABU

## 2019-02-18 NOTE — PLAN OF CARE
02/18/19 1400   Activity/Group Therapy Checklist   Group Goals/Reflection   Attendance Attended   Follows Direction Followed directions   Group Interactions/Observations Interacted appropriately   Affect/Mood Range Normal range   Affect/Mood Display Appropriate   Goal Progression Progressing

## 2019-02-19 ENCOUNTER — HOSPITAL ENCOUNTER (OUTPATIENT)
Dept: PSYCHIATRY | Facility: HOSPITAL | Age: 49
Discharge: HOME | End: 2019-02-19
Attending: PSYCHIATRY & NEUROLOGY
Payer: MEDICAID

## 2019-02-19 DIAGNOSIS — F10.20 ALCOHOL USE DISORDER, SEVERE, DEPENDENCE: Primary | ICD-10-CM

## 2019-02-19 LAB — BREATH ALCOHOL: 0

## 2019-02-19 PROCEDURE — 90853 PR GROUP PSYCHOTHERAPY: ICD-10-PCS | Mod: HP,HB,, | Performed by: PSYCHOLOGIST

## 2019-02-19 PROCEDURE — 90853 GROUP PSYCHOTHERAPY: CPT

## 2019-02-19 PROCEDURE — 90853 GROUP PSYCHOTHERAPY: CPT | Mod: HP,HB,, | Performed by: PSYCHOLOGIST

## 2019-02-19 PROCEDURE — 90853 GROUP PSYCHOTHERAPY: CPT | Performed by: SOCIAL WORKER

## 2019-02-19 NOTE — PROGRESS NOTES
"  2019 5:28 PM  Name: Glo Flores  : 1970  Start Date: 19    ABU Intensive Outpatient Program   Progress Note    Status: Intensive Outpatient Program (IOP)    HPI:  Glo Flores is a 48 y.o. female with a past psychiatric history of alcohol use, who presented to ABU IOP due to same.      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 States that from age [age] had a partner that didn't drink etoh and neither did she, then their relationship ended. Mentions she was isolated during that time in her life from others. After that relationship ended, she made more social connections, developed a close group of friends, and her support system improved. She also notes that she began to have a glass of wine every so often, started going out with friends.      Prior to , didn't drink except socially from age [age], about 3-4 glasses of wine a week, slowly increased over time.  At work in , she experienced stress due to change in her supervisor. She felt her work performance remained the same, though in contrast to assessments with previous supervisor, she received negative evaluations. This was a large source of dissatisfaction and distress for her in the workplace.   Went on a cruise with her friends, met her current partner who drank heavily; per patient he has a problem with alcohol as well and has since stopped drinking.   Reports these two events correlated with when she started drinking more. 2 bottles of wine per night, though then noticed she would drink whatever was available.      She notes she began to experience dysfunction during that time, and friends told her she needed to stop drinking. 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 Though states that she was "too far in" and started reporting to " another supervisor.      End of July 2018 she was suspended from work due to issues with penmanship (due to tremulousness), missed work time and attendance; that day reported herself to the board of nursing. End of September went for her evaluation at [facility] addiction center and was recommended residential treatment. Started going to AA on [address] since beginning of September, then went to rehab 11/19/19.   Was able to stop drinking during interval from hospitalization to rehab admission by going to AA daily.      Was treated in the hospital with DTs and hypokalemia in 8/2018 when she tried to stop drinking, though had alcohol withdrawal, so presented to hospital tremulousness. Last drink 8/30/2018, was in hospital on morning of admission. Talks about being under PEC at the hospital, has vague memories of that time. Parents visited with her then, she reports her family is supportive.      Talks about a higher power during interview, and friends who offered her help; these are sources of strength.      Remarks that friends noticed that she was drinking more, isolating, tried to tell her to stop but she wouldn't listen. Impacted her worse performance, if she didn't feel like going she wouldn't. Wouldn't drink before work, though did call in sick once when she was intoxicated and went back to sleep. Had previously tried to stop drinking, though with withdrawal symptoms she would try to cut down and ended up drinking more again.      Did pay cash for rehab though not financial stressed presently.      Patient reports she completed program at [place], believes she benefited from it greatly. Her partner stopped drinking the same day that she did, when she entered the hospital for withdrawal. He is also attending AA, though didn't go to rehab and doesn't yet have a sponsor; he is supportive.      Patient reports she experienced feeling anhedonic in the past, though this has since resolved; she is on treatment  with prozac that her ob/gyn started when she had perimenopausal symptoms and reports it has been well tolerated. Additionally started trazodone during rehab stay, helped her sleep well. Admits that she has dealt with worries about the next day, particularly when trying to sleep and some social anxiety though doesn't experience debilitating symptoms.    SUBJECTIVE:     Interval Hx:  1/28/19 Ms. Flores reports that she has been well, denies SE to medications or cravings. Continues to attend AA daily, changed group due to timing of IOP and notes that new group is particularly crowded though did not find experience off putting.     Toxicology Screen: 1/28 etoh breath test negative, etoh biomarkers in process, utox negative   Medication Side Effects: None  Cravings: no  No other acute psychiatric issues reported at this time.    1/30/19 Reports she has been well, AA daily, no SE to medications. No cravings reported.     Toxicology Screen: 1/28 etoh breath test negative, etoh biomarkers negative, utox negative; 1/30 breath test negative, utox in process   Medication Side Effects: None  Cravings: no  No other acute psychiatric issues reported at this time.    2/1/19  Pt hyperverbal, laughing throughout interview.  Pt reports that she is doing well today.  Recounts events preceding her admission to IOP program in detail.  Endorses good sleep, good appetite.  Denies racing thoughts or increased energy.  Attending AA daily.  Denies SE to medication.  Denies cravings.  Wishes to continue current medication regimen.      Toxicology Screen: UDS 1/30 negative, breath alcohol 2/1 negative, UDS 2/1 negative  Medication Side Effects: denies  Cravings: denies  No other acute psychiatric issues reported at this time.    2/4/19   Reports having a good weekend. Got some alone time at home, watched TV, denied urges to drink. Sleeping well. Mood has been good. One of her tests from [facility] came back as positive, was reportedly due to  fluoxetine, needed updated med list for RNP. Going to meetings at [facility], has gotten a sponsor. Feeling eager to get back to work but acknowledges she needs to prioritize sobriety.    Toxicology Screen: UDS 2/4 negative, breath alcohol 2/4 negative, UDS 2/1 negative  Medication Side Effects: denies  Cravings: denies  No other acute psychiatric issues reported at this time.    2/6/19  Partner:  Family day was yesterday and Kellie boyfriend came to the session yesterday. She and her boyfriend attend AA together and they are maintaining their sobriety. She mentioned that she has learned from mistakes of past relationships and is working on not repeating the same mistakes with her current partner.    Work:  She is taking steps to get back to work. Speaking with future supervisor to work out her starting schedule. She needs to find out what the RNP requires. Her meetings for RNP will be Monday afternoons at [facility]. 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 We discussed her tendency to procrastinate and she is working on improving this trait.    Mood: Doing great.  Sleep: Sleeping well. In bed by 8-9pm last night  Appetite: Unchanged    Toxicology Screen: UDS 2/4 negative, breath alcohol 2/5 negative, UDS 2/1 negative  Medication Side Effects: denies  Cravings: denies. Sometimes Glo is reminded of drinking when she goes through the aisle at [employer], so she tries her best to ignore this.  No other acute psychiatric issues reported at this time.      2/8/19  Reports doing well. Met with supervisor at [facility], discussed working prn for first 6 months of her RNP contract. Feels good about this plan. Saw a lot of colleagues, reports reception from them was universally positive. Mood is good, sleeping well. Denies cravings. Reports that meeting with supervisor did bring up what she had done when she was  using, and doesn't want to go back to that.   Discussed naltrexone, Glo declines this treatment at this point, says she doesn't have any desire to use, doesn't really see a role for the medication at this point. Discussed how it is something to keep on the table as an option moving forward.     Toxicology Screen: UDS 2/8 pending, breath alcohol today negative, UDS 2/6 negative  Medication Side Effects: denies  Cravings: denies.   No other acute psychiatric issues reported at this time.    2/12/19  Reports doing well, had a low key weekend. Going to meetings regularly, has a sponsor, also has connected with other women in the program. Going to speak at [employer] next week. Denies cravings. Working on getting back to work, plans to work prn shifts 3 days/week. Thinks that this will be a good set up for her. Groups going well, d/c date is next Thursday.      Toxicology Screen: UDS 2/11 negative, breath alcohol today negative, UDS 2/8 negative  Medication Side Effects: denies  Cravings: denies.   No other acute psychiatric issues reported at this time.    2/15/19  Pt reports that she is doing well.  She reports that she continues to attend meetings regularly.  Denies any substance use this week.  Denies cravings.  Tolerating scheduled medications without difficulty.  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  No complaints today.    2/19/19  Doing well. Looking forward to last day of program on Thursday. Plans to continue with Aftercare as well as required RNP programing. Probably returning to work PRN next month. Acknowledges that she will need to continue to work her program to maintain sobriety. Has a sponsor, goes to [employer] mtg daily. Tomorrow is going to share at [employer]. Mood stable, no cravings. Plans to check out some [employer] parades, but will go to [employer] where she won't be right by a bar.     Toxicology Screen: UDS 2/18 negative, breath alcohol 2/18, today,   "negative  Medication Side Effects: Denies  Cravings: Denies  No other acute psychiatric issues reported at this time.    Scheduled Meds:   No current outpatient medications on file prior to encounter.     No current facility-administered medications on file prior to encounter.      Allergies:  Patient has no known allergies.    Psychiatric Review Of Systems:  Denies    Medical ROS:  See H&P dated 1/25/19 for ROS.     OBJECTIVE:     Vital Signs (Most Recent):  There were no vitals filed for this visit.    Mental Status Exam:  Appearance: unremarkable, age appropriate, casual clothing   Level of Consciousness: awake   Behavior/Cooperation: friendly and cooperative  Psychomotor: unremarkable   Speech: normal tone, talkative but interruptable, normal pitch, normal volume.   Language: english, fluid  Orientation: person, place, situation, time/date  Attention Span/Concentration: intact   Memory: intact to conversation  Mood: "good"  Affect: mood congruent, reactive  Thought Process: linear, normal and logical  Associations: normal and logical  Thought Content: normal, no suicidality, no homicidality, delusions, or paranoia  Fund of Knowledge: Aware of current events  Abstraction: proverbs were abstract on initial assessment, did not re-assess   Insight: good  Judgment: good    Laboratory:  Recent Results (from the past 168 hour(s))   Toxicology screen, urine    Collection Time: 02/13/19 11:43 AM   Result Value Ref Range    Alcohol, Urine <10 <10 mg/dL    Benzodiazepines Negative     Methadone metabolites Negative     Cocaine (Metab.) Negative     Opiate Scrn, Ur Negative     Barbiturate Screen, Ur Negative     Amphetamine Screen, Ur Negative     THC Negative     Phencyclidine Negative     Creatinine, Random Ur 93.0 15.0 - 325.0 mg/dL    Toxicology Information SEE COMMENT    POCT BREATH ALCOHOL TEST    Collection Time: 02/13/19 11:44 AM   Result Value Ref Range    Breath Alcohol 0.000    POCT BREATH ALCOHOL TEST    " Collection Time: 02/14/19 11:12 AM   Result Value Ref Range    Breath Alcohol 0.000    Toxicology screen, urine    Collection Time: 02/15/19 10:37 AM   Result Value Ref Range    Alcohol, Urine <10 <10 mg/dL    Benzodiazepines Negative     Methadone metabolites Negative     Cocaine (Metab.) Negative     Opiate Scrn, Ur Negative     Barbiturate Screen, Ur Negative     Amphetamine Screen, Ur Negative     THC Negative     Phencyclidine Negative     Creatinine, Random Ur 28.0 15.0 - 325.0 mg/dL    Toxicology Information SEE COMMENT    POCT BREATH ALCOHOL TEST    Collection Time: 02/15/19 10:37 AM   Result Value Ref Range    Breath Alcohol 0.000    POCT BREATH ALCOHOL TEST    Collection Time: 02/18/19 10:12 AM   Result Value Ref Range    Breath Alcohol 0.000    Toxicology screen, urine    Collection Time: 02/18/19 10:12 AM   Result Value Ref Range    Alcohol, Urine <10 <10 mg/dL    Benzodiazepines Negative     Methadone metabolites Negative     Cocaine (Metab.) Negative     Opiate Scrn, Ur Negative     Barbiturate Screen, Ur Negative     Amphetamine Screen, Ur Negative     THC Negative     Phencyclidine Negative     Creatinine, Random Ur 73.0 15.0 - 325.0 mg/dL    Toxicology Information SEE COMMENT    POCT BREATH ALCOHOL TEST    Collection Time: 02/19/19 10:49 AM   Result Value Ref Range    Breath Alcohol 0.000      ASSESSMENT:     Glo Flores is a 48 y.o. female with a past psychiatric history of alcohol use disorder, who presented to [facility] due to same.      Ms. Flores has had significant dysfunction from alcohol use disorder causing occupational, social, and interpersonal dysfunction and failed attempts to maintain sobriety and complex withdrawal syndrome who has now completed residential rehab and would benefit from continued care in a supportive setting. Has been hyperverbal on some interviews, but no other sx of wilma/hypomania, pt with insight into her tendency to be a talker, working towards taking a pause  "and "reading the room."Pt requires IOP level of care due to interpersonal and occupation dysfunction and negative health effects from alcohol use. Making progress toward anticipated d/c date of 2/21.     IMPRESSION  Alcohol use disorder, severe, in early remission   Hx of depression, unspecified, in remission   Hx OCPD    PLAN:     · Continue patient on ABU protocol.  · Breathalyzer and urine toxicology daily.  · VS daily x 3 days.  · Patient counseled on abstinence from alcohol.  · Continue regular AA meetings/work with sponsor     Medications: Continue current medications.  · Trazodone 50mg po qdaily (since 11/2018) off label for insomnia   · Prozac 20mg po qdaily off label for perimenopausal symptoms per ob/gyn   · Metoprolol 12.5mg po qdaily for HTN  · For Alcohol Use Disorder, discussed naltrexone for cravings, pt declines at this time but will revisit, ramona as stakes are high regarding consequences with relapse and RNP     Status: Continue treatment on ABU     Nadira Juarez MD  Addiction Psychiatry Fellow  2/19/2019  "

## 2019-02-19 NOTE — PROGRESS NOTES
Group Psychotherapy (PhD/LCSW)    Site: [facility]    Clinical status of patient: Intensive Outpatient Program (IOP)    Date: 2/19/2019    Group Focus:  Disease Model of Addiction      Length of service: 15049 - 45-50 minutes    Number of patients in attendance: 6    Referred by: Addictive Behavior Unit Treatment Team    Target symptoms: Alcohol Abuse    Patient's response to treatment: Active Listening and Self-disclosure    Progress toward goals: Progressing adequately    Interval History: Discussed the basic neuropsychological concepts of the Disease Model of Addiction and their relevance to the phenomena of addiction (eg powerlessness; euphoric recall; cravings; using dreams and thoughts; relapse; tolerance; etc). Noted the value of understanding the Disease Model for sustaining long-term sobriety.     Diagnosis: alcohol use disorder, severe, dependence    Plan: Continue treatment on ABU

## 2019-02-19 NOTE — PROGRESS NOTES
Group Psychotherapy (PhD/LCSW)    Site: [facility]    Clinical status of patient: Intensive Outpatient Program (IOP)    Date: 2/19/2019    Group Focus: Psychodynamic Group Psychotherapy    Length of service: 35730 - 45-50 minutes    Number of patients in attendance: 6    Referred by: Addictive Behavior Unit Treatment Team    Target symptoms: Alcohol Abuse    Patient's response to treatment: Active Listening and Self-disclosure    Progress toward goals: Progressing adequately    Interval History: Discussed enjoying AA. Has not heard back from work, but is not worrying about it.    Diagnosis: alcohol use disorder, severe, dependence    Plan: Continue treatment on ABU

## 2019-02-19 NOTE — PLAN OF CARE
02/19/19 1400   Activity/Group Therapy Checklist   Group Goals/Reflection   Attendance Attended   Follows Direction Followed directions   Group Interactions/Observations Interacted appropriately   Affect/Mood Range Normal range   Affect/Mood Display Appropriate   Goal Progression Progressing

## 2019-02-19 NOTE — PROGRESS NOTES
Group Psychotherapy (PhD/LCSW)    Site: [facility]    Clinical status of patient: Intensive Outpatient Program (IOP)    Date: 2/19/2019    Group Focus: CBT Group Psychotherapy    Length of service: 40711 - 45-50 minutes    Number of patients in attendance: 8    Referred by: Addictive Behavior Unit Treatment Team    Target symptoms: Alcohol Abuse    Patient's response to treatment: Active Listening and Self-disclosure    Progress toward goals: Progressing adequately    Interval History: Session focus was Sleep Hygiene.  Patients were provided with sleep hygiene strategies and instructions for calm breathing, setting up a bedtime routine, and having a thinking/worry hour in order to facilitate efficient and restful sleep.    Diagnosis: alcohol use disorder, severe, dependence    Plan: Continue treatment on ABU

## 2019-02-20 ENCOUNTER — HOSPITAL ENCOUNTER (OUTPATIENT)
Dept: PSYCHIATRY | Facility: HOSPITAL | Age: 49
Discharge: HOME | End: 2019-02-20
Attending: PSYCHIATRY & NEUROLOGY
Payer: MEDICAID

## 2019-02-20 VITALS — SYSTOLIC BLOOD PRESSURE: 125 MMHG | RESPIRATION RATE: 16 BRPM | DIASTOLIC BLOOD PRESSURE: 67 MMHG | HEART RATE: 94 BPM

## 2019-02-20 DIAGNOSIS — F10.20 ALCOHOL USE DISORDER, SEVERE, DEPENDENCE: Primary | ICD-10-CM

## 2019-02-20 LAB
AMPHET+METHAMPHET UR QL: NEGATIVE
BARBITURATES UR QL SCN>200 NG/ML: NEGATIVE
BENZODIAZ UR QL SCN>200 NG/ML: NEGATIVE
BREATH ALCOHOL: 0
BZE UR QL SCN: NEGATIVE
CANNABINOIDS UR QL SCN: NEGATIVE
CREAT UR-MCNC: 32 MG/DL
ETHANOL UR-MCNC: <10 MG/DL
ETHYL GLUCURONIDE: NEGATIVE
METHADONE UR QL SCN>300 NG/ML: NEGATIVE
OPIATES UR QL SCN: NEGATIVE
PCP UR QL SCN>25 NG/ML: NEGATIVE
TOXICOLOGY INFORMATION: NORMAL

## 2019-02-20 PROCEDURE — 90853 GROUP PSYCHOTHERAPY: CPT

## 2019-02-20 PROCEDURE — 90853 PR GROUP PSYCHOTHERAPY: ICD-10-PCS | Mod: 59,HP,HB, | Performed by: PSYCHOLOGIST

## 2019-02-20 PROCEDURE — 90853 GROUP PSYCHOTHERAPY: CPT | Mod: HP,HB,, | Performed by: PSYCHOLOGIST

## 2019-02-20 PROCEDURE — 90853 GROUP PSYCHOTHERAPY: CPT | Performed by: SOCIAL WORKER

## 2019-02-20 PROCEDURE — 90853 GROUP PSYCHOTHERAPY: CPT | Mod: 59,HP,HB, | Performed by: PSYCHOLOGIST

## 2019-02-20 PROCEDURE — 90853 PR GROUP PSYCHOTHERAPY: ICD-10-PCS | Mod: HP,HB,, | Performed by: PSYCHOLOGIST

## 2019-02-20 PROCEDURE — 80307 DRUG TEST PRSMV CHEM ANLYZR: CPT

## 2019-02-20 NOTE — PROGRESS NOTES
Group Psychotherapy (PhD/LCSW)    Site: [facility]    Clinical status of patient: Intensive Outpatient Program (IOP)    Date: 2/20/2019    Group Focus: Psychodynamic Group Psychotherapy    Length of service: 00376 - 45-50 minutes    Number of patients in attendance: 8    Referred by: Addictive Behavior Unit Treatment Team    Target symptoms: Alcohol Abuse    Patient's response to treatment: Active Listening and Self-disclosure    Progress toward goals: Progressing adequately    Interval History: Shared her story with new group member.    Diagnosis: alcohol use disorder, severe, dependence    Plan: Continue treatment on ABU

## 2019-02-20 NOTE — PROGRESS NOTES
"Group Psychotherapy (PhD/LCSW)    Site: [facility]    Clinical status of patient: Intensive Outpatient Program (IOP)    Date: 2/20/2019    Group Focus:  Stress Management       Length of service: 25190 - 45-50 minutes    Number of patients in attendance: 11    Referred by: Addictive Behavior Unit Treatment Team    Target symptoms: Alcohol Abuse    Patient's response to treatment: Active Listening and Self-disclosure    Progress toward goals: Progressing adequately    Interval History: Discussed the concepts of the "negativity bias of the brain", neuroplasticity, and strategies for cultivation a more positive outlook.     Diagnosis: alcohol use disorder, severe, dependence    Plan: Continue treatment on ABU        "

## 2019-02-20 NOTE — PATIENT CARE CONFERENCE
ABU Staffing:   Alcohol use Disorder, Severe, in early remission  Hx of depression, unspecified in remission  Hx OCPD          1. Pt is attending all groups    2. Pt is attending all meetings  3. Pt 's has minimally supportive family  4. Pt has completed spiritual assessment    5. Pt will present life story    6. Pt will present Step One assignment    7. Pt is exploring issues related to relapse  prevention; spirituality; stress management; improved communication skills; assertiveness training; poor self-esteem; disease concepts; cross addictions; and, work related issues    8. D/C date:2/21     Staff discussed pt's dc date on 2/21/19. Staff discussed pts follow up therapy session with Salbador and medication management with Dr. Stone. Staff discussed pts continued monopolizing behavior while in group.  Staff discussed pts moderation of group at [facility]. Staff discussed pts anxious feelings related to going back to work. Staff discussed pts lack of discretion related to other pts. Staff discussed pts medication management and pts continuation of Prozac.     Problem: Alcohol use Disorder, Severe, in early remission  Goal: Address in 12 step meetings and group and individual sessions    Objective Measure: participation in groups, self report, length of sobriety, and relapse prevention plan  Time: Prior to discharge    Progress: Pt is attending groups and sessions       Problem: Hx of depression, unspecified in remission  Goal: Address in 12 step meetings and group and individual sessions    Objective Measure: participation in groups, self report, length of sobriety, and relapse prevention plan  Time: Prior to discharge    Progress: Pt is attending groups and sessions       Problem: Hx OCPD  Goal: Address in 12 step meetings and group and individual sessions    Objective Measure: participation in groups, self report, length of sobriety, and relapse prevention plan  Time: Prior to discharge    Progress: Pt is  attending groups and sessions           Staff members present:    MD Dr. Ann Carranza MD Fellow  MD Jessica Resident  Dr. Victoria, Ph.D  Joyce Sanchez, [facility]  Kai Valdez, [facility]  Della Davis RN

## 2019-02-20 NOTE — PROGRESS NOTES
Group Psychotherapy (PhD/LCSW)    Site: [facility]    Clinical status of patient: Intensive Outpatient Program (IOP)    Date: 2/20/2019    Group Focus: Aftercare Group    Length of service: 42117 - 45-50 minutes    Number of patients in attendance: 5    Referred by: Addictive Behavior Unit Treatment Team    Target symptoms: Alcohol Abuse    Patient's response to treatment: Active Listening and Self-disclosure    Progress toward goals: Progressing adequately    Interval History: Shared her story with the group.    Diagnosis: alcohol use disorder, severe, dependence    Plan: Continue treatment on ABU

## 2019-02-20 NOTE — PLAN OF CARE
02/20/19 1400   Activity/Group Therapy Checklist   Group Relapse Prevention   Attendance Attended   Follows Direction Followed directions   Group Interactions/Observations Interacted appropriately   Affect/Mood Range Normal range   Affect/Mood Display Appropriate   Goal Progression Progressing

## 2019-02-21 ENCOUNTER — HOSPITAL ENCOUNTER (OUTPATIENT)
Dept: PSYCHIATRY | Facility: HOSPITAL | Age: 49
Discharge: HOME | End: 2019-02-21
Attending: PSYCHIATRY & NEUROLOGY
Payer: MEDICAID

## 2019-02-21 DIAGNOSIS — F10.20 ALCOHOL USE DISORDER, SEVERE, DEPENDENCE: Primary | ICD-10-CM

## 2019-02-21 LAB — BREATH ALCOHOL: 0

## 2019-02-21 PROCEDURE — 90853 GROUP PSYCHOTHERAPY: CPT

## 2019-02-21 PROCEDURE — 90853 GROUP PSYCHOTHERAPY: CPT | Performed by: SOCIAL WORKER

## 2019-02-21 PROCEDURE — 90853 GROUP PSYCHOTHERAPY: CPT | Mod: HP,HB,, | Performed by: PSYCHOLOGIST

## 2019-02-21 PROCEDURE — 90853 PR GROUP PSYCHOTHERAPY: ICD-10-PCS | Mod: HP,HB,, | Performed by: PSYCHOLOGIST

## 2019-02-21 NOTE — PROGRESS NOTES
Group Psychotherapy (PhD/LCSW)    Site: [facility]    Clinical status of patient: Intensive Outpatient Program (IOP)    Date: 2/21/2019    Group Focus: DBT-Based Group Therapy    Length of service: 41909 - 45-50 minutes    Number of patients in attendance: 13    Referred by: Addictive Behavior Unit Treatment Team    Target symptoms: Alcohol Abuse    Patient's response to treatment: Active Listening; Self-disclosure    Progress toward goals: Progressing adequately    Interval History: Session focus was Distress Tolerance:  STOP and Self-Soothe.  Patients were encouraged to use crisis survival skills to reduce intensity of distress.  Each patient identified something soothing for all five senses.    Diagnosis: Alcohol Use Disorder, severe dependence     Plan: Continue treatment on ABU

## 2019-02-21 NOTE — PROGRESS NOTES
Group Psychotherapy (PhD/LCSW)    Site: [facility]    Clinical status of patient: Intensive Outpatient Program (IOP)    Date: 2/21/2019    Group Focus: Psychodynamic Group Psychotherapy    Length of service: [id number] - 45-50 minutes    Number of patients in attendance: 7    Referred by: Addictive Behavior Unit Treatment Team    Target symptoms: Alcohol Abuse    Patient's response to treatment: Active Listening and Self-disclosure    Progress toward goals: Progressing adequately    Interval History: Shared her story with new group member.    Diagnosis: alcohol use disorder, severe, dependence    Plan: Continue treatment on ABU

## 2019-02-21 NOTE — PLAN OF CARE
02/21/19 1000   Activity/Group Therapy Checklist   Group Educational   Attendance Attended   Follows Direction Followed directions   Group Interactions/Observations Interacted appropriately   Affect/Mood Range Normal range   Affect/Mood Display Appropriate   Goal Progression Progressing

## 2019-02-21 NOTE — PROGRESS NOTES
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 States that from age [age] had a partner that didn't drink etoh and neither did she, then their relationship ended. Mentions she was isolated during that time in her life from others. After that relationship ended, she made more social connections, developed a close group of friends, and her support system improved. She also notes that she began to have a glass of wine every so often, started going out with friends.      Prior to , didn't drink except socially from age [age], about 3-4 glasses of wine a week, slowly increased over time.  At work in , she experienced stress due to change in her supervisor. She felt her work performance remained the same, though in contrast to assessments with previous supervisor, she received negative evaluations. This was a large source of dissatisfaction and distress for her in the workplace.   Went on a cruise with her friends, met her current partner who drank heavily; per patient he has a problem with alcohol as well and has since stopped drinking.   Reports these two events correlated with when she started drinking more. 2 bottles of wine per night, though then noticed she would drink whatever was available.      She notes she began to experience dysfunction during that time, and friends told her she needed to stop drinking. Things came to a head when she was demoted from nursing educator in 2018; she was offered staff nurse position or severance package, went down to 32 hours a week but got a raise. Though states that she  "was "too far in" and started reporting to another supervisor.      End of July 2018 she was suspended from work due to issues with penmanship (due to tremulousness), missed work time and attendance; that day reported herself to the board of nursing. End of September went for her evaluation at [facility] addiction center and was recommended residential treatment. Started going to AA on [address] since beginning of September, then went to rehab 11/19/19.   Was able to stop drinking during interval from hospitalization to rehab admission by going to AA daily.      Was treated in the hospital with DTs and hypokalemia in 8/2018 when she tried to stop drinking, though had alcohol withdrawal, so presented to hospital tremulousness. Last drink 8/30/2018, was in hospital on morning of admission. Talks about being under PEC at the hospital, has vague memories of that time. Parents visited with her then, she reports her family is supportive.      Talks about a higher power during interview, and friends who offered her help; these are sources of strength.      Remarks that friends noticed that she was drinking more, isolating, tried to tell her to stop but she wouldn't listen. Impacted her worse performance, if she didn't feel like going she wouldn't. Wouldn't drink before work, though did call in sick once when she was intoxicated and went back to sleep. Had previously tried to stop drinking, though with withdrawal symptoms she would try to cut down and ended up drinking more again.      Did pay cash for rehab though not financial stressed presently.      Patient reports she completed program at [facility], believes she benefited from it greatly. Her partner stopped drinking the same day that she did, when she entered the hospital for withdrawal. He is also attending AA, though didn't go to rehab and doesn't yet have a sponsor; he is supportive.      Patient reports she experienced feeling anhedonic in the past, though this " has since resolved; she is on treatment with prozac that her ob/gyn started when she had perimenopausal symptoms and reports it has been well tolerated. Additionally started trazodone during rehab stay, helped her sleep well. Admits that she has dealt with worries about the next day, particularly when trying to sleep and some social anxiety though doesn't experience debilitating symptoms.      Program course/treatments:  Patient started IOP in the ABU on 1/25/19  Patient was continued on Prozac 20mg and Trazodone 50 mg qhs; he/she tolerated the medication well without any side effects. Patient was breathalyzed and gave a urine toxicology daily. Patient attended AA meetings daily during her time in the program and was able to get a sponsor.She completed the program without relapse and in good standing. She plans to continue attending AA meetings and plans to remain in contact with sponsor. Aftercare plan includes attending Aftercare group at [facility] on Wednesdays, as well as attending her required RNP Aftercare group, doing 90 AA meetings in 90 days, and following up with an addictionologist She will also be monitored by the RNP for 5 years.     Labs:  Recent Results (from the past 840 hour(s))   POCT BREATH ALCOHOL TEST    Collection Time: 01/25/19 11:07 AM   Result Value Ref Range    Breath Alcohol 0.000    Pregnancy, urine rapid    Collection Time: 01/25/19 11:07 AM   Result Value Ref Range    Preg Test, Ur Negative    Toxicology screen, urine    Collection Time: 01/25/19 11:07 AM   Result Value Ref Range    Alcohol, Urine <10 <10 mg/dL    Benzodiazepines Negative     Methadone metabolites Negative     Cocaine (Metab.) Negative     Opiate Scrn, Ur Negative     Barbiturate Screen, Ur Negative     Amphetamine Screen, Ur Negative     THC Negative     Phencyclidine Negative     Creatinine, Random Ur 38.0 15.0 - 325.0 mg/dL    Toxicology Information SEE COMMENT    Toxicology screen, urine    Collection Time: 01/28/19  11:21 AM   Result Value Ref Range    Alcohol, Urine <10 <10 mg/dL    Benzodiazepines Negative     Methadone metabolites Negative     Cocaine (Metab.) Negative     Opiate Scrn, Ur Negative     Barbiturate Screen, Ur Negative     Amphetamine Screen, Ur Negative     THC Negative     Phencyclidine Negative     Creatinine, Random Ur 28.0 15.0 - 325.0 mg/dL    Toxicology Information SEE COMMENT    Alcohol Biomarkers, urine    Collection Time: 01/28/19 11:21 AM   Result Value Ref Range    Ethyl Glucuronide NEGATIVE    POCT BREATH ALCOHOL TEST    Collection Time: 01/28/19 11:22 AM   Result Value Ref Range    Breath Alcohol 0.000    POCT BREATH ALCOHOL TEST    Collection Time: 01/29/19 11:46 AM   Result Value Ref Range    Breath Alcohol 0.000    Toxicology screen, urine    Collection Time: 01/30/19 11:15 AM   Result Value Ref Range    Alcohol, Urine <10 <10 mg/dL    Benzodiazepines Negative     Methadone metabolites Negative     Cocaine (Metab.) Negative     Opiate Scrn, Ur Negative     Barbiturate Screen, Ur Negative     Amphetamine Screen, Ur Negative     THC Negative     Phencyclidine Negative     Creatinine, Random Ur 87.0 15.0 - 325.0 mg/dL    Toxicology Information SEE COMMENT    POCT BREATH ALCOHOL TEST    Collection Time: 01/30/19 11:16 AM   Result Value Ref Range    Breath Alcohol 0.000    POCT BREATH ALCOHOL TEST    Collection Time: 01/31/19 11:19 AM   Result Value Ref Range    Breath Alcohol 0.000    POCT BREATH ALCOHOL TEST    Collection Time: 02/01/19 11:32 AM   Result Value Ref Range    Breath Alcohol 0.000    Toxicology screen, urine    Collection Time: 02/01/19 11:32 AM   Result Value Ref Range    Alcohol, Urine <10 <10 mg/dL    Benzodiazepines Negative     Methadone metabolites Negative     Cocaine (Metab.) Negative     Opiate Scrn, Ur Negative     Barbiturate Screen, Ur Negative     Amphetamine Screen, Ur Negative     THC Negative     Phencyclidine Negative     Creatinine, Random Ur 55.0 15.0 - 325.0 mg/dL     Toxicology Information SEE COMMENT    Toxicology screen, urine    Collection Time: 02/04/19 11:19 AM   Result Value Ref Range    Alcohol, Urine <10 <10 mg/dL    Benzodiazepines Negative     Methadone metabolites Negative     Cocaine (Metab.) Negative     Opiate Scrn, Ur Negative     Barbiturate Screen, Ur Negative     Amphetamine Screen, Ur Negative     THC Negative     Phencyclidine Negative     Creatinine, Random Ur 116.0 15.0 - 325.0 mg/dL    Toxicology Information SEE COMMENT    Alcohol Biomarkers, urine    Collection Time: 02/04/19 11:19 AM   Result Value Ref Range    Ethyl Glucuronide NEGATIVE    POCT BREATH ALCOHOL TEST    Collection Time: 02/04/19 11:20 AM   Result Value Ref Range    Breath Alcohol 0.000    POCT BREATH ALCOHOL TEST    Collection Time: 02/05/19 10:46 AM   Result Value Ref Range    Breath Alcohol 0.000    Toxicology screen, urine    Collection Time: 02/06/19 10:59 AM   Result Value Ref Range    Alcohol, Urine <10 <10 mg/dL    Benzodiazepines Negative     Methadone metabolites Negative     Cocaine (Metab.) Negative     Opiate Scrn, Ur Negative     Barbiturate Screen, Ur Negative     Amphetamine Screen, Ur Negative     THC Negative     Phencyclidine Negative     Creatinine, Random Ur 25.0 15.0 - 325.0 mg/dL    Toxicology Information SEE COMMENT    POCT BREATH ALCOHOL TEST    Collection Time: 02/06/19 10:59 AM   Result Value Ref Range    Breath Alcohol 0.000    VITAMIN B12    Collection Time: 02/07/19  9:30 AM   Result Value Ref Range    Vitamin B-12 250 210 - 950 pg/mL   FOLATE    Collection Time: 02/07/19  9:30 AM   Result Value Ref Range    Folate 9.6 4.0 - 24.0 ng/mL   TSH    Collection Time: 02/07/19  9:30 AM   Result Value Ref Range    TSH 1.157 0.400 - 4.000 uIU/mL   COMPREHENSIVE METABOLIC PANEL    Collection Time: 02/07/19  9:30 AM   Result Value Ref Range    Sodium 137 136 - 145 mmol/L    Potassium 4.0 3.5 - 5.1 mmol/L    Chloride 108 95 - 110 mmol/L    CO2 20 (L) 23 - 29 mmol/L     Glucose 109 70 - 110 mg/dL    BUN, Bld 8 6 - 20 mg/dL    Creatinine 0.8 0.5 - 1.4 mg/dL    Calcium 9.3 8.7 - 10.5 mg/dL    Total Protein 6.9 6.0 - 8.4 g/dL    Albumin 3.6 3.5 - 5.2 g/dL    Total Bilirubin 0.7 0.1 - 1.0 mg/dL    Alkaline Phosphatase 51 (L) 55 - 135 U/L    AST 18 10 - 40 U/L    ALT 28 10 - 44 U/L    Anion Gap 9 8 - 16 mmol/L    eGFR if African American >60.0 >60 mL/min/1.73 m^2    eGFR if non African American >60.0 >60 mL/min/1.73 m^2   CBC W/ AUTO DIFFERENTIAL    Collection Time: 02/07/19  9:30 AM   Result Value Ref Range    WBC 6.40 3.90 - 12.70 K/uL    RBC 4.36 4.00 - 5.40 M/uL    Hemoglobin 14.0 12.0 - 16.0 g/dL    Hematocrit 42.7 37.0 - 48.5 %    MCV 98 82 - 98 fL    MCH 32.1 (H) 27.0 - 31.0 pg    MCHC 32.8 32.0 - 36.0 g/dL    RDW 12.8 11.5 - 14.5 %    Platelets 308 150 - 350 K/uL    MPV 9.9 9.2 - 12.9 fL    Immature Granulocytes 0.3 0.0 - 0.5 %    Gran # (ANC) 3.6 1.8 - 7.7 K/uL    Immature Grans (Abs) 0.02 0.00 - 0.04 K/uL    Lymph # 2.3 1.0 - 4.8 K/uL    Mono # 0.4 0.3 - 1.0 K/uL    Eos # 0.1 0.0 - 0.5 K/uL    Baso # 0.04 0.00 - 0.20 K/uL    nRBC 0 0 /100 WBC    Gran% 56.8 38.0 - 73.0 %    Lymph% 35.2 18.0 - 48.0 %    Mono% 5.8 4.0 - 15.0 %    Eosinophil% 1.3 0.0 - 8.0 %    Basophil% 0.6 0.0 - 1.9 %    Differential Method Automated    GAMMA GT    Collection Time: 02/07/19  9:30 AM   Result Value Ref Range    GGT 12 8 - 55 U/L   POCT BREATH ALCOHOL TEST    Collection Time: 02/07/19 11:07 AM   Result Value Ref Range    Breath Alcohol 0.000    Toxicology screen, urine    Collection Time: 02/08/19 11:16 AM   Result Value Ref Range    Alcohol, Urine <10 <10 mg/dL    Benzodiazepines Negative     Methadone metabolites Negative     Cocaine (Metab.) Negative     Opiate Scrn, Ur Negative     Barbiturate Screen, Ur Negative     Amphetamine Screen, Ur Negative     THC Negative     Phencyclidine Negative     Creatinine, Random Ur 27.0 15.0 - 325.0 mg/dL    Toxicology Information SEE COMMENT    POCT BREATH  ALCOHOL TEST    Collection Time: 02/08/19 11:16 AM   Result Value Ref Range    Breath Alcohol 0.000    POCT BREATH ALCOHOL TEST    Collection Time: 02/08/19 11:17 AM   Result Value Ref Range    Breath Alcohol 0.000    Toxicology screen, urine    Collection Time: 02/11/19 10:56 AM   Result Value Ref Range    Alcohol, Urine <10 <10 mg/dL    Benzodiazepines Negative     Methadone metabolites Negative     Cocaine (Metab.) Negative     Opiate Scrn, Ur Negative     Barbiturate Screen, Ur Negative     Amphetamine Screen, Ur Negative     THC Negative     Phencyclidine Negative     Creatinine, Random Ur 28.0 15.0 - 325.0 mg/dL    Toxicology Information SEE COMMENT    Alcohol Biomarkers, urine    Collection Time: 02/11/19 10:56 AM   Result Value Ref Range    Ethyl Glucuronide NEGATIVE    POCT BREATH ALCOHOL TEST    Collection Time: 02/11/19 11:09 AM   Result Value Ref Range    Breath Alcohol 0.000    POCT BREATH ALCOHOL TEST    Collection Time: 02/12/19 11:09 AM   Result Value Ref Range    Breath Alcohol 0.000    Toxicology screen, urine    Collection Time: 02/13/19 11:43 AM   Result Value Ref Range    Alcohol, Urine <10 <10 mg/dL    Benzodiazepines Negative     Methadone metabolites Negative     Cocaine (Metab.) Negative     Opiate Scrn, Ur Negative     Barbiturate Screen, Ur Negative     Amphetamine Screen, Ur Negative     THC Negative     Phencyclidine Negative     Creatinine, Random Ur 93.0 15.0 - 325.0 mg/dL    Toxicology Information SEE COMMENT    POCT BREATH ALCOHOL TEST    Collection Time: 02/13/19 11:44 AM   Result Value Ref Range    Breath Alcohol 0.000    POCT BREATH ALCOHOL TEST    Collection Time: 02/14/19 11:12 AM   Result Value Ref Range    Breath Alcohol 0.000    Toxicology screen, urine    Collection Time: 02/15/19 10:37 AM   Result Value Ref Range    Alcohol, Urine <10 <10 mg/dL    Benzodiazepines Negative     Methadone metabolites Negative     Cocaine (Metab.) Negative     Opiate Scrn, Ur Negative      Barbiturate Screen, Ur Negative     Amphetamine Screen, Ur Negative     THC Negative     Phencyclidine Negative     Creatinine, Random Ur 28.0 15.0 - 325.0 mg/dL    Toxicology Information SEE COMMENT    POCT BREATH ALCOHOL TEST    Collection Time: 02/15/19 10:37 AM   Result Value Ref Range    Breath Alcohol 0.000    POCT BREATH ALCOHOL TEST    Collection Time: 02/18/19 10:12 AM   Result Value Ref Range    Breath Alcohol 0.000    Toxicology screen, urine    Collection Time: 02/18/19 10:12 AM   Result Value Ref Range    Alcohol, Urine <10 <10 mg/dL    Benzodiazepines Negative     Methadone metabolites Negative     Cocaine (Metab.) Negative     Opiate Scrn, Ur Negative     Barbiturate Screen, Ur Negative     Amphetamine Screen, Ur Negative     THC Negative     Phencyclidine Negative     Creatinine, Random Ur 73.0 15.0 - 325.0 mg/dL    Toxicology Information SEE COMMENT    Alcohol Biomarkers, urine    Collection Time: 02/18/19 10:12 AM   Result Value Ref Range    Ethyl Glucuronide NEGATIVE    POCT BREATH ALCOHOL TEST    Collection Time: 02/19/19 10:49 AM   Result Value Ref Range    Breath Alcohol 0.000    POCT BREATH ALCOHOL TEST    Collection Time: 02/20/19 11:35 AM   Result Value Ref Range    Breath Alcohol 0.000    Toxicology screen, urine    Collection Time: 02/20/19 12:49 PM   Result Value Ref Range    Alcohol, Urine <10 <10 mg/dL    Benzodiazepines Negative     Methadone metabolites Negative     Cocaine (Metab.) Negative     Opiate Scrn, Ur Negative     Barbiturate Screen, Ur Negative     Amphetamine Screen, Ur Negative     THC Negative     Phencyclidine Negative     Creatinine, Random Ur 32.0 15.0 - 325.0 mg/dL    Toxicology Information SEE COMMENT        Vitals:  There were no vitals filed for this visit.    Mental Status Exam:  Appearance: unremarkable, age appropriate, casually dressed  Behavior/Cooperation: normal, friendly and cooperative  Speech: normal tone, normal rate, normal pitch, normal volume  Mood:  fine  Affect: normal and euthymic  Thought Process: normal and logical, talkative but interruptable   Thought Content: normal, no suicidality, no homicidality, delusions, or paranoia  Orientation: grossly intact  Memory: Grossly intact  Attention Span/Concentration: Normal  Cognition: grossly intact  Insight: intact  Judgment: intact    Discharge Instructions:    Diet:  No restrictions    Activity:  activity as tolerated    Medications:  1. Continue Prozac 20mg daily. Discuss with outpatient psychiatrist prior to stopping or changing dose.   2. Continue trazodone 50mg qhs prn insomnia  3. Continue metoprolol 12.5mg       · Take all medications as prescribed  · Follow up with outpatient mental health provider as discussed with treatment team  · Talk to your provider about any concerns or side effects with your medications  · Avoid all drugs and alcohol  · Continue to follow regulations of the RNP      Nadira Juarez MD  Addiction Psychiatry Fellow  2/21/2019

## 2019-02-27 ENCOUNTER — OFFICE VISIT (OUTPATIENT)
Dept: PSYCHIATRY | Facility: CLINIC | Age: 49
End: 2019-02-27
Payer: MEDICAID

## 2019-02-27 DIAGNOSIS — F10.21 ALCOHOL USE DISORDER, SEVERE, IN EARLY REMISSION, DEPENDENCE: Primary | ICD-10-CM

## 2019-02-27 PROCEDURE — 90853 GROUP PSYCHOTHERAPY: CPT | Mod: HP,HB,S$PBB, | Performed by: PSYCHOLOGIST

## 2019-02-27 PROCEDURE — 90853 PR GROUP PSYCHOTHERAPY: ICD-10-PCS | Mod: HP,HB,S$PBB, | Performed by: PSYCHOLOGIST

## 2019-02-27 PROCEDURE — 90853 GROUP PSYCHOTHERAPY: CPT | Mod: PBBFAC | Performed by: PSYCHOLOGIST

## 2019-02-27 NOTE — PROGRESS NOTES
Group Psychotherapy    Site: [facility]    Clinical status of patient: Outpatient    2/27/2019    Length of service:[id number]    Referred by: Addictive Behavior Unit     Number of patients in attendance: 5    Target symptoms: alcohol abuse    Patient's response to intervention:  The patient's response to intervention is active listening, self-disclosure.    Progress toward goals and other mental status changes:  The patient's progress toward goals is fair .    Interval history: Shared her story with another group member. Reports continued abstinence. Discussed completing paperwork to return to work.    Diagnosis: alcohol use disorder, severe, dependence, in early remission    Plan: group psychotherapy, medication management by physician, AA, abstinence, compliance with RNP contract and UCS    Return to clinic: 1 week

## 2019-03-06 ENCOUNTER — OFFICE VISIT (OUTPATIENT)
Dept: PSYCHIATRY | Facility: CLINIC | Age: 49
End: 2019-03-06
Payer: MEDICAID

## 2019-03-06 DIAGNOSIS — F10.21 ALCOHOL USE DISORDER, SEVERE, IN EARLY REMISSION, DEPENDENCE: Primary | ICD-10-CM

## 2019-03-06 PROCEDURE — 90853 GROUP PSYCHOTHERAPY: CPT | Mod: HP,HB,, | Performed by: PSYCHOLOGIST

## 2019-03-06 PROCEDURE — 90853 PR GROUP PSYCHOTHERAPY: ICD-10-PCS | Mod: HP,HB,, | Performed by: PSYCHOLOGIST

## 2019-03-06 NOTE — PROGRESS NOTES
Group Psychotherapy    Site: [facility]    Clinical status of patient: Outpatient    3/6/2019    Length of service:[id number]    Referred by: Addictive Behavior Unit     Number of patients in attendance: 2    Target symptoms: alcohol abuse    Patient's response to intervention:  The patient's response to intervention is active listening, self-disclosure.    Progress toward goals and other mental status changes:  The patient's progress toward goals is fair .    Interval history: Reports continued abstinence. Going to meetings daily and enjoying them. Boyfriends remains sober and active in AA. Still awaiting word from work. In good spirits.    Diagnosis: alcohol use disorder, severe, dependence, in early remission    Plan: group psychotherapy, medication management by physician, AA, abstinence, compliance with RNP contract and UCS    Return to clinic: 1 week

## 2019-03-11 ENCOUNTER — CLINICAL SUPPORT (OUTPATIENT)
Dept: URGENT CARE | Facility: CLINIC | Age: 49
End: 2019-03-11

## 2019-03-11 DIAGNOSIS — Z76.89 ENCOUNTER FOR ASSESSMENT OF ALCOHOL AND DRUG USE: Primary | ICD-10-CM

## 2019-03-12 ENCOUNTER — OFFICE VISIT (OUTPATIENT)
Dept: PSYCHIATRY | Facility: CLINIC | Age: 49
End: 2019-03-12
Payer: MEDICAID

## 2019-03-12 DIAGNOSIS — F32.A DEPRESSION, UNSPECIFIED DEPRESSION TYPE: ICD-10-CM

## 2019-03-12 DIAGNOSIS — F10.21 ALCOHOL USE DISORDER, SEVERE, IN EARLY REMISSION, DEPENDENCE: Primary | ICD-10-CM

## 2019-03-12 PROCEDURE — 90791 PSYCH DIAGNOSTIC EVALUATION: CPT | Mod: AJ,HB,S$PBB, | Performed by: SOCIAL WORKER

## 2019-03-12 PROCEDURE — 90791 PR PSYCHIATRIC DIAGNOSTIC EVALUATION: ICD-10-PCS | Mod: AJ,HB,S$PBB, | Performed by: SOCIAL WORKER

## 2019-03-12 PROCEDURE — 90791 PSYCH DIAGNOSTIC EVALUATION: CPT | Mod: PBBFAC | Performed by: SOCIAL WORKER

## 2019-03-13 ENCOUNTER — OFFICE VISIT (OUTPATIENT)
Dept: PSYCHIATRY | Facility: CLINIC | Age: 49
End: 2019-03-13
Payer: MEDICAID

## 2019-03-13 DIAGNOSIS — F10.21 ALCOHOL USE DISORDER, SEVERE, IN EARLY REMISSION, DEPENDENCE: Primary | ICD-10-CM

## 2019-03-13 PROCEDURE — 90853 GROUP PSYCHOTHERAPY: CPT | Mod: HP,HB,S$PBB, | Performed by: PSYCHOLOGIST

## 2019-03-13 PROCEDURE — 90853 PR GROUP PSYCHOTHERAPY: ICD-10-PCS | Mod: HP,HB,S$PBB, | Performed by: PSYCHOLOGIST

## 2019-03-13 PROCEDURE — 90853 GROUP PSYCHOTHERAPY: CPT | Mod: PBBFAC | Performed by: PSYCHOLOGIST

## 2019-03-13 NOTE — PROGRESS NOTES
Group Psychotherapy    Site: [facility]    Clinical status of patient: Outpatient    3/13/2019    Length of service:[id number]    Referred by: Addictive Behavior Unit     Number of patients in attendance: 5    Target symptoms: alcohol abuse    Patient's response to intervention:  The patient's response to intervention is active listening, self-disclosure.    Progress toward goals and other mental status changes:  The patient's progress toward goals is fair .    Interval history: Reports continued abstinence. Discussed likely returning to work next week. In good spirits.    Diagnosis: alcohol use disorder, severe, dependence, in early remission    Plan: group psychotherapy, medication management by physician, AA, abstinence, compliance with RNP contract and UCS    Return to clinic: 1 week

## 2019-03-13 NOTE — PROGRESS NOTES
Psychiatry Initial Visit (PhD/LCSW)  Diagnostic Interview - CPT 64747    Date: 3/12/2019    Site: [facility]    [facility]     Clinical status of patient: Outpatient    Glo Flores, a 48 y.o. female, for initial evaluation visit.  Met with patient.    Chief complaint/reason for encounter: addictive disorder and depression    History of present illness: 48 year old female patient presents to the clinic today for initial visit with chief complaint of addictive disorder and depression.  She was referred to the clinic for individual therapy, following successful completion of the ABU-IOP.  She reports continued sobriety from alcohol.  No relapses.  Denies cravings.  Reports regular AA group meeting attendance.  Has a sponsor.  Working 90/90.  Patient is attending aftercare group with Dr. Victoria.  She has been compliant with the [place] [facility]'s requirements.  Attending an RNP group.  Her license has been formally reinstated, which she is happy about.  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  Patient has a history of depression.  Taking Trazodone and Prozac.  Feels like her mood is currently well managed.  Her boyfriend is a good support.       Pain: noncontributory    Symptoms:   · Mood: Hx of depression-in remission        · Anxiety: denied  · Substance abuse: alcohol use disorder-in early remission        · Cognitive functioning: denied  · Health behaviors: noncontributory    Psychiatric history: has participated in counseling/psychotherapy on an outpatient basis in the past and currently under psychiatric care    Medical history: noncontributory    Family history of psychiatric illness: maternal grandfather had history of alcohol use disorder     Social history (marriage, employment, etc.): Patient lives with her boyfriend.  Never .  No children.  Patient's parents reside in [place].  One brother (46).  Patient is an RN.  College graduate.   Raised [employer].        Substance use:   Alcohol: alcohol use disorder-in early remission        Drugs: none   Tobacco: 1/2 ppd    Caffeine: occasional        Current medications and drug reactions (include OTC, herbal): see medication list     Strengths and liabilities: Strength: Patient accepts guidance/feedback, Strength: Patient is expressive/articulate., Strength: Patient is motivated for change., Strength: Patient is physically healthy., Liability: Patient lacks coping skills.    Current Evaluation:     Mental Status Exam:  General Appearance:  unremarkable, age appropriate   Speech: normal tone, normal rate, normal pitch, normal volume      Level of Cooperation: cooperative      Thought Processes: normal and logical   Mood: euthymic      Thought Content: normal, no suicidality, no homicidality, delusions, or paranoia   Affect: congruent and appropriate   Orientation: Oriented x3   Memory: recent >  intact, remote >  intact   Attention Span & Concentration: intact   Fund of General Knowledge: intact and appropriate to age and level of education   Abstract Reasoning: not assessed    Judgment & Insight: intact     Language  intact     Diagnostic Impression - Plan:       ICD-10-CM ICD-9-CM   1. Alcohol use disorder, severe, in early remission, dependence F10.21 303.93   2. Depression, unspecified depression type F32.9 311       Plan:individual psychotherapy    Return to Clinic: as needed    Length of Service (minutes): 45

## 2019-03-20 ENCOUNTER — OFFICE VISIT (OUTPATIENT)
Dept: PSYCHIATRY | Facility: CLINIC | Age: 49
End: 2019-03-20
Payer: MEDICAID

## 2019-03-20 DIAGNOSIS — F10.21 ALCOHOL USE DISORDER, SEVERE, IN EARLY REMISSION, DEPENDENCE: Primary | ICD-10-CM

## 2019-03-20 PROCEDURE — 90853 PR GROUP PSYCHOTHERAPY: ICD-10-PCS | Mod: HP,HB,S$PBB, | Performed by: PSYCHOLOGIST

## 2019-03-20 PROCEDURE — 90853 GROUP PSYCHOTHERAPY: CPT | Mod: PBBFAC | Performed by: PSYCHOLOGIST

## 2019-03-20 PROCEDURE — 90853 GROUP PSYCHOTHERAPY: CPT | Mod: HP,HB,S$PBB, | Performed by: PSYCHOLOGIST

## 2019-03-20 NOTE — PROGRESS NOTES
Group Psychotherapy    Site: [facility]    Clinical status of patient: Outpatient    3/20/2019    Length of service:[id number]    Referred by: [facility]     Number of patients in attendance: 2    Target symptoms: alcohol abuse    Patient's response to intervention:  The patient's response to intervention is active listening, self-disclosure.    Progress toward goals and other mental status changes:  The patient's progress toward goals is fair .    Interval history: Reports continued abstinence. Discussed returning to work yesterday. Working on not having resentment toward manager.    Diagnosis: alcohol use disorder, severe, dependence, in early remission    Plan: group psychotherapy, medication management by physician, AA, abstinence, compliance with RNP contract and UCS    Return to clinic: 1 week

## 2019-03-27 ENCOUNTER — OFFICE VISIT (OUTPATIENT)
Dept: PSYCHIATRY | Facility: CLINIC | Age: 49
End: 2019-03-27
Payer: MEDICAID

## 2019-03-27 VITALS
WEIGHT: 186.63 LBS | DIASTOLIC BLOOD PRESSURE: 78 MMHG | SYSTOLIC BLOOD PRESSURE: 127 MMHG | HEART RATE: 82 BPM | BODY MASS INDEX: 31.86 KG/M2 | HEIGHT: 64 IN

## 2019-03-27 DIAGNOSIS — F33.42 RECURRENT MAJOR DEPRESSIVE DISORDER, IN FULL REMISSION: ICD-10-CM

## 2019-03-27 DIAGNOSIS — F10.21 ALCOHOL USE DISORDER, SEVERE, IN EARLY REMISSION: Primary | ICD-10-CM

## 2019-03-27 DIAGNOSIS — F10.21 ALCOHOL USE DISORDER, SEVERE, IN EARLY REMISSION, DEPENDENCE: Primary | ICD-10-CM

## 2019-03-27 PROCEDURE — 90853 PR GROUP PSYCHOTHERAPY: ICD-10-PCS | Mod: HP,HB,S$PBB, | Performed by: PSYCHOLOGIST

## 2019-03-27 PROCEDURE — 99213 PR OFFICE/OUTPT VISIT, EST, LEVL III, 20-29 MIN: ICD-10-PCS | Mod: AF,HB,S$PBB, | Performed by: PSYCHIATRY & NEUROLOGY

## 2019-03-27 PROCEDURE — 99999 PR PBB SHADOW E&M-EST. PATIENT-LVL II: ICD-10-PCS | Mod: PBBFAC,,, | Performed by: PSYCHIATRY & NEUROLOGY

## 2019-03-27 PROCEDURE — 90853 GROUP PSYCHOTHERAPY: CPT | Mod: PBBFAC | Performed by: PSYCHOLOGIST

## 2019-03-27 PROCEDURE — 90853 GROUP PSYCHOTHERAPY: CPT | Mod: HP,HB,S$PBB, | Performed by: PSYCHOLOGIST

## 2019-03-27 PROCEDURE — 99213 OFFICE O/P EST LOW 20 MIN: CPT | Mod: AF,HB,S$PBB, | Performed by: PSYCHIATRY & NEUROLOGY

## 2019-03-27 PROCEDURE — 99999 PR PBB SHADOW E&M-EST. PATIENT-LVL II: CPT | Mod: PBBFAC,,, | Performed by: PSYCHIATRY & NEUROLOGY

## 2019-03-27 PROCEDURE — 99212 OFFICE O/P EST SF 10 MIN: CPT | Mod: PBBFAC,25 | Performed by: PSYCHIATRY & NEUROLOGY

## 2019-03-27 RX ORDER — METOPROLOL SUCCINATE 25 MG/1
12.5 TABLET, EXTENDED RELEASE ORAL DAILY
COMMUNITY
Start: 2019-03-04

## 2019-03-27 RX ORDER — NORETHINDRONE ACETATE AND ETHINYL ESTRADIOL AND FERROUS FUMARATE 1MG-20(21)
KIT ORAL
Refills: 1 | COMMUNITY
Start: 2019-01-17

## 2019-03-27 RX ORDER — FLUOXETINE HYDROCHLORIDE 20 MG/1
20 CAPSULE ORAL DAILY
Refills: 0 | COMMUNITY
Start: 2019-03-04 | End: 2019-10-28 | Stop reason: SDUPTHER

## 2019-03-27 RX ORDER — TRAZODONE HYDROCHLORIDE 50 MG/1
50 TABLET ORAL DAILY
Refills: 1 | COMMUNITY
Start: 2019-03-11 | End: 2019-06-05 | Stop reason: SDUPTHER

## 2019-03-27 NOTE — PROGRESS NOTES
Group Psychotherapy    Site: [facility]    Clinical status of patient: Outpatient    3/27/2019    Length of service:[id number]    Referred by: [facility] Behavior Unit     Number of patients in attendance: 4    Target symptoms: alcohol abuse    Patient's response to intervention:  The patient's response to intervention is active listening, self-disclosure.    Progress toward goals and other mental status changes:  The patient's progress toward goals is fair .    Interval history: Reports continued abstinence. Discussed working on not taking on responsibilities that are not her own at work.    Diagnosis: alcohol use disorder, severe, dependence, in early remission    Plan: group psychotherapy, medication management by physician, AA, abstinence, compliance with RNP contract and [employer]    Return to clinic: 1 week

## 2019-03-27 NOTE — PROGRESS NOTES
"Ambulatory Psychiatry Established Patient Follow-up Note      Chief Complaint  presents for followup of alcohol use disorder    Time Spent  15 minutes    HISTORY  Interval History  Doing well. Starting work again last week. Felt good to be back. Sober now 7 months. Compliant with meds. Mood good. Sleeping well.       ROS   Constitutional: no fatigue or appetite or weight change  Eyes: no problems with vision  ENT/Mouth: no problems with hearing, swallowing  Cardiovascular: no chest pain  Respiratory: no shortness of breath  Gastrointestinal: no constipation or diarrhea or abdominal pain or nausea/vomiting  Genitourinary: no urinary difficulties  Musculoskeletal: no aches or pains  Skin: no rashes  Neurologic: no numbness or weakness   Endocrine: no sweating or hot flashes.   All other systems were negative.    Psych ROS covered in [facility] [place]  Past Medical History was reviewed and there was no change in past medical history  Family History was not reviewed  Social History was reviewed, changes in social history noted in interval history above.  Medications/problem list/allergies were reviewed and updated in the patient summary.    Medications    Scheduled and PRN Medications   No current outpatient medications on file.    Allergies  Review of patient's allergies indicates:  No Known Allergies    EXAM  VITALS   Vitals:    03/27/19 0901   BP: 127/78   Pulse: 82   Weight: 84.6 kg (186 lb 9.9 oz)   Height: 5' 4" (1.626 m)       RELEVANT LABS/STUDIES:    PSYCHIATRIC EXAMINATION  Appearance: well groomed, appearing healthy and of stated age    Behavior: cooperative, pleasant, no psychomotor retardation or agitation  Speech: normal rate, rhythm, prosody, volume. Talkative which is baseline.  Mood: good  Affect: normal range  Thought Process: linear, logical, goal directed  Thought Content: negative for suicidal ideation, homicidal ideation, delusions or hallucinations.  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 Remains sober and euthymic.      DIAGNOSES  Alcohol Use Disorder, severe, in early remisson  Depressive Disorder recurren tin remission        PLAN  -continue prozac 20 mg daily for depression  -continue trazodone 50 mg qhs for insomnia  -continue monitoring per RNP, other therapy, ABU aftercare and regular AA  -return in 3 months for follow up    More than 50% of the time was spent on counseling and coordination of care.  Psychoeducation, behavioral counseling.

## 2019-03-30 PROBLEM — F33.42 RECURRENT MAJOR DEPRESSIVE DISORDER, IN FULL REMISSION: Status: ACTIVE | Noted: 2019-03-30

## 2019-04-01 ENCOUNTER — OCCUPATIONAL HEALTH (OUTPATIENT)
Dept: URGENT CARE | Facility: CLINIC | Age: 49
End: 2019-04-01

## 2019-04-01 DIAGNOSIS — Z02.83 ENCOUNTER FOR DRUG SCREENING: Primary | ICD-10-CM

## 2019-04-01 PROCEDURE — 80305 PR COLLECTION ONLY DRUG SCREEN: ICD-10-PCS | Mod: S$GLB,,, | Performed by: EMERGENCY MEDICINE

## 2019-04-01 PROCEDURE — 80305 DRUG TEST PRSMV DIR OPT OBS: CPT | Mod: S$GLB,,, | Performed by: EMERGENCY MEDICINE

## 2019-04-01 NOTE — PROGRESS NOTES
Subjective:       Patient ID: Glo Flores is a 48 y.o. female.    Chief Complaint: Affinity-Blood    Blood collection for affinity.     ROS    Objective:      Physical Exam    Assessment:       No diagnosis found.    Plan:                   No follow-ups on file.

## 2019-04-03 ENCOUNTER — OFFICE VISIT (OUTPATIENT)
Dept: PSYCHIATRY | Facility: CLINIC | Age: 49
End: 2019-04-03

## 2019-04-03 DIAGNOSIS — F10.21 ALCOHOL USE DISORDER, SEVERE, IN EARLY REMISSION, DEPENDENCE: Primary | ICD-10-CM

## 2019-04-03 PROCEDURE — 90853 PR GROUP PSYCHOTHERAPY: ICD-10-PCS | Mod: S$PBB,,, | Performed by: PSYCHOLOGIST

## 2019-04-03 PROCEDURE — 90853 GROUP PSYCHOTHERAPY: CPT | Mod: S$PBB,,, | Performed by: PSYCHOLOGIST

## 2019-04-03 PROCEDURE — 90853 GROUP PSYCHOTHERAPY: CPT | Mod: PBBFAC | Performed by: PSYCHOLOGIST

## 2019-04-03 NOTE — PROGRESS NOTES
Group Psychotherapy    Site: [facility]    Clinical status of patient: Outpatient    4/3/2019    Length of service:[id number]    Referred by: Addictive Behavior Unit     Number of patients in attendance: 3    Target symptoms: alcohol abuse    Patient's response to intervention:  The patient's response to intervention is active listening, self-disclosure.    Progress toward goals and other mental status changes:  The patient's progress toward goals is fair .    Interval history: Reports continued abstinence. Discussed attending a women's meeting and enjoying it. No problems at work.    Diagnosis: alcohol use disorder, severe, dependence, in early remission    Plan: group psychotherapy, medication management by physician, AA, abstinence, compliance with RNP contract and [employer]CS    Return to clinic: 1 week

## 2019-04-10 ENCOUNTER — OFFICE VISIT (OUTPATIENT)
Dept: PSYCHIATRY | Facility: CLINIC | Age: 49
End: 2019-04-10

## 2019-04-10 DIAGNOSIS — F10.21 ALCOHOL USE DISORDER, SEVERE, IN EARLY REMISSION, DEPENDENCE: Primary | ICD-10-CM

## 2019-04-10 PROCEDURE — 90853 GROUP PSYCHOTHERAPY: CPT | Mod: S$PBB,,, | Performed by: PSYCHOLOGIST

## 2019-04-10 PROCEDURE — 90853 GROUP PSYCHOTHERAPY: CPT | Mod: PBBFAC | Performed by: PSYCHOLOGIST

## 2019-04-10 PROCEDURE — 90853 PR GROUP PSYCHOTHERAPY: ICD-10-PCS | Mod: S$PBB,,, | Performed by: PSYCHOLOGIST

## 2019-04-10 NOTE — PROGRESS NOTES
Group Psychotherapy    Site: [facility]    Clinical status of patient: Outpatient    4/10/2019    Length of service:[id number]    Referred by: Add[facility] Behavior Unit     Number of patients in attendance: 4    Target symptoms: alcohol abuse    Patient's response to intervention:  The patient's response to intervention is active listening, self-disclosure.    Progress toward goals and other mental status changes:  The patient's progress toward goals is fair .    Interval history: Reports continued abstinence. Discussed things going well at work.    Diagnosis: alcohol use disorder, severe, dependence, in early remission    Plan: group psychotherapy, medication management by physician, AA, abstinence, compliance with RNP contract and UCS    Return to clinic: 1 week

## 2019-04-17 ENCOUNTER — OFFICE VISIT (OUTPATIENT)
Dept: PSYCHIATRY | Facility: CLINIC | Age: 49
End: 2019-04-17

## 2019-04-17 DIAGNOSIS — F10.21 ALCOHOL USE DISORDER, SEVERE, IN EARLY REMISSION, DEPENDENCE: Primary | ICD-10-CM

## 2019-04-17 PROCEDURE — 90853 GROUP PSYCHOTHERAPY: CPT | Mod: PBBFAC | Performed by: PSYCHOLOGIST

## 2019-04-17 PROCEDURE — 90853 PR GROUP PSYCHOTHERAPY: ICD-10-PCS | Mod: S$PBB,,, | Performed by: PSYCHOLOGIST

## 2019-04-17 PROCEDURE — 90853 GROUP PSYCHOTHERAPY: CPT | Mod: S$PBB,,, | Performed by: PSYCHOLOGIST

## 2019-04-17 NOTE — PROGRESS NOTES
Group Psychotherapy    Site: [facility]    Clinical status of patient: Outpatient    4/17/2019    Length of service:[id number]    Referred by: Add[facility]ive Behavior Unit     Number of patients in attendance: 3    Target symptoms: alcohol abuse    Patient's response to intervention:  The patient's response to intervention is active listening, self-disclosure.    Progress toward goals and other mental status changes:  The patient's progress toward goals is fair .    Interval history: Reports continued abstinence. Has almost completed 90/90. Was asked to learn a new process at work, which validates her.    Diagnosis: alcohol use disorder, severe, dependence, in early remission    Plan: group psychotherapy, medication management by physician, AA, abstinence, compliance with RNP contract and UCS    Return to clinic: 1 week

## 2019-04-22 ENCOUNTER — OCCUPATIONAL HEALTH (OUTPATIENT)
Dept: URGENT CARE | Facility: CLINIC | Age: 49
End: 2019-04-22

## 2019-04-22 DIAGNOSIS — Z76.89 ENCOUNTER FOR ASSESSMENT OF ALCOHOL AND DRUG USE: Primary | ICD-10-CM

## 2019-04-22 PROCEDURE — 80305 DRUG TEST PRSMV DIR OPT OBS: CPT | Mod: S$GLB,,, | Performed by: EMERGENCY MEDICINE

## 2019-04-22 PROCEDURE — 80305 PR COLLECTION ONLY DRUG SCREEN: ICD-10-PCS | Mod: S$GLB,,, | Performed by: EMERGENCY MEDICINE

## 2019-04-24 ENCOUNTER — OFFICE VISIT (OUTPATIENT)
Dept: PSYCHIATRY | Facility: CLINIC | Age: 49
End: 2019-04-24

## 2019-04-24 DIAGNOSIS — F10.21 ALCOHOL USE DISORDER, SEVERE, IN EARLY REMISSION, DEPENDENCE: Primary | ICD-10-CM

## 2019-04-24 PROCEDURE — 90853 GROUP PSYCHOTHERAPY: CPT | Mod: S$PBB,,, | Performed by: PSYCHOLOGIST

## 2019-04-24 PROCEDURE — 90853 PR GROUP PSYCHOTHERAPY: ICD-10-PCS | Mod: S$PBB,,, | Performed by: PSYCHOLOGIST

## 2019-04-24 PROCEDURE — 90853 GROUP PSYCHOTHERAPY: CPT | Mod: PBBFAC | Performed by: PSYCHOLOGIST

## 2019-04-24 NOTE — PROGRESS NOTES
Group Psychotherapy    Site: [facility]    Clinical status of patient: Outpatient    4/24/2019    Length of service:[id number]    Referred by: Add[facility]ive Behavior Unit     Number of patients in attendance: 3    Target symptoms: alcohol abuse    Patient's response to intervention:  The patient's response to intervention is active listening, self-disclosure.    Progress toward goals and other mental status changes:  The patient's progress toward goals is fair .    Interval history: Reports continued abstinence. Completed 90/90. Being appreciated at work.    Diagnosis: alcohol use disorder, severe, dependence, in early remission    Plan: group psychotherapy, medication management by physician, AA, abstinence, compliance with RNP contract and UCS    Return to clinic: 1 week

## 2019-05-01 ENCOUNTER — OFFICE VISIT (OUTPATIENT)
Dept: PSYCHIATRY | Facility: CLINIC | Age: 49
End: 2019-05-01
Payer: COMMERCIAL

## 2019-05-01 DIAGNOSIS — F10.21 ALCOHOL USE DISORDER, SEVERE, IN EARLY REMISSION, DEPENDENCE: Primary | ICD-10-CM

## 2019-05-01 PROCEDURE — 90853 GROUP PSYCHOTHERAPY: CPT | Mod: S$GLB,,, | Performed by: PSYCHOLOGIST

## 2019-05-01 PROCEDURE — 90853 PR GROUP PSYCHOTHERAPY: ICD-10-PCS | Mod: S$GLB,,, | Performed by: PSYCHOLOGIST

## 2019-05-01 NOTE — PROGRESS NOTES
Group Psychotherapy    Site: [facility]    Clinical status of patient: Outpatient    5/1/2019    Length of service:[id number]    Referred by: Addictive Behavior Unit     Number of patients in attendance: 2    Target symptoms: alcohol abuse    Patient's response to intervention:  The patient's response to intervention is active listening, self-disclosure.    Progress toward goals and other mental status changes:  The patient's progress toward goals is fair .    Interval history: Reports continued abstinence. Discussed improved attitude at work.    Diagnosis: alcohol use disorder, severe, dependence, in early remission    Plan: group psychotherapy, medication management by physician, AA, abstinence, compliance with RNP contract and UCS    Return to clinic: 1 week

## 2019-05-06 ENCOUNTER — OCCUPATIONAL HEALTH (OUTPATIENT)
Dept: URGENT CARE | Facility: CLINIC | Age: 49
End: 2019-05-06
Payer: COMMERCIAL

## 2019-05-06 DIAGNOSIS — Z76.89 ENCOUNTER FOR ASSESSMENT OF ALCOHOL AND DRUG USE: Primary | ICD-10-CM

## 2019-05-08 ENCOUNTER — OFFICE VISIT (OUTPATIENT)
Dept: PSYCHIATRY | Facility: CLINIC | Age: 49
End: 2019-05-08
Payer: COMMERCIAL

## 2019-05-08 DIAGNOSIS — F10.21 ALCOHOL USE DISORDER, SEVERE, IN EARLY REMISSION, DEPENDENCE: Primary | ICD-10-CM

## 2019-05-08 PROCEDURE — 90853 PR GROUP PSYCHOTHERAPY: ICD-10-PCS | Mod: S$GLB,,, | Performed by: PSYCHOLOGIST

## 2019-05-08 PROCEDURE — 90853 GROUP PSYCHOTHERAPY: CPT | Mod: S$GLB,,, | Performed by: PSYCHOLOGIST

## 2019-05-08 NOTE — PROGRESS NOTES
Group Psychotherapy    Site: [facility]    Clinical status of patient: Outpatient    5/8/2019    Length of service:[id number]    Referred by: Addictive Behavior Unit     Number of patients in attendance: 5    Target symptoms: alcohol abuse    Patient's response to intervention:  The patient's response to intervention is active listening, self-disclosure.    Progress toward goals and other mental status changes:  The patient's progress toward goals is fair .    Interval history: Reports continued abstinence. Discussed working on 4th and 5th steps. Went to [facility] without difficulty.    Diagnosis: alcohol use disorder, severe, dependence, in early remission    Plan: group psychotherapy, medication management by physician, AA, abstinence, compliance with RNP contract and UCS    Return to clinic: 1 week

## 2019-05-15 ENCOUNTER — OFFICE VISIT (OUTPATIENT)
Dept: PSYCHIATRY | Facility: CLINIC | Age: 49
End: 2019-05-15
Payer: COMMERCIAL

## 2019-05-15 DIAGNOSIS — F10.21 ALCOHOL USE DISORDER, SEVERE, IN EARLY REMISSION, DEPENDENCE: Primary | ICD-10-CM

## 2019-05-15 PROCEDURE — 90853 GROUP PSYCHOTHERAPY: CPT | Mod: S$GLB,,, | Performed by: PSYCHOLOGIST

## 2019-05-15 PROCEDURE — 90853 PR GROUP PSYCHOTHERAPY: ICD-10-PCS | Mod: S$GLB,,, | Performed by: PSYCHOLOGIST

## 2019-05-15 NOTE — PROGRESS NOTES
Group Psychotherapy    Site: [facility]    Clinical status of patient: Outpatient    5/15/2019    Length of service:[id number]    Referred by: Addictive Behavior Unit     Number of patients in attendance: 5    Target symptoms: alcohol abuse    Patient's response to intervention:  The patient's response to intervention is active listening, self-disclosure.    Progress toward goals and other mental status changes:  The patient's progress toward goals is fair .    Interval history: Reports continued abstinence. Discussed how family members who were drinking behaved on Mother's Day.    Diagnosis: alcohol use disorder, severe, dependence, in early remission    Plan: group psychotherapy, medication management by physician, AA, abstinence, compliance with RNP contract and UCS    Return to clinic: 1 week

## 2019-05-22 ENCOUNTER — OFFICE VISIT (OUTPATIENT)
Dept: PSYCHIATRY | Facility: CLINIC | Age: 49
End: 2019-05-22
Payer: COMMERCIAL

## 2019-05-22 DIAGNOSIS — F10.21 ALCOHOL USE DISORDER, SEVERE, IN EARLY REMISSION, DEPENDENCE: Primary | ICD-10-CM

## 2019-05-22 PROCEDURE — 90853 PR GROUP PSYCHOTHERAPY: ICD-10-PCS | Mod: S$GLB,,, | Performed by: PSYCHOLOGIST

## 2019-05-22 PROCEDURE — 90853 GROUP PSYCHOTHERAPY: CPT | Mod: S$GLB,,, | Performed by: PSYCHOLOGIST

## 2019-05-22 NOTE — PROGRESS NOTES
Group Psychotherapy    Site: [facility]    Clinical status of patient: Outpatient    5/22/2019    Length of service:[id number]    Referred by: Addictive Behavior Unit     Number of patients in attendance: 5    Target symptoms: alcohol abuse    Patient's response to intervention:  The patient's response to intervention is active listening, self-disclosure.    Progress toward goals and other mental status changes:  The patient's progress toward goals is fair .    Interval history: Reports continued abstinence. Continues to work on getting involved in things at work that only pertain to her.     Diagnosis: alcohol use disorder, severe, dependence, in early remission    Plan: group psychotherapy, medication management by physician, AA, abstinence, compliance with RNP contract and UCS    Return to clinic: 1 week

## 2019-06-05 ENCOUNTER — OCCUPATIONAL HEALTH (OUTPATIENT)
Dept: URGENT CARE | Facility: CLINIC | Age: 49
End: 2019-06-05

## 2019-06-05 ENCOUNTER — OFFICE VISIT (OUTPATIENT)
Dept: PSYCHIATRY | Facility: CLINIC | Age: 49
End: 2019-06-05
Payer: COMMERCIAL

## 2019-06-05 VITALS
WEIGHT: 188.38 LBS | DIASTOLIC BLOOD PRESSURE: 72 MMHG | SYSTOLIC BLOOD PRESSURE: 126 MMHG | HEIGHT: 64 IN | HEART RATE: 87 BPM | BODY MASS INDEX: 32.16 KG/M2

## 2019-06-05 DIAGNOSIS — Z02.83 ENCOUNTER FOR DRUG SCREENING: Primary | ICD-10-CM

## 2019-06-05 DIAGNOSIS — F10.21 ALCOHOL USE DISORDER, SEVERE, IN EARLY REMISSION, DEPENDENCE: ICD-10-CM

## 2019-06-05 DIAGNOSIS — G47.00 INSOMNIA, UNSPECIFIED TYPE: ICD-10-CM

## 2019-06-05 DIAGNOSIS — F10.21 ALCOHOL USE DISORDER, SEVERE, IN EARLY REMISSION: Primary | ICD-10-CM

## 2019-06-05 DIAGNOSIS — F33.42 RECURRENT MAJOR DEPRESSIVE DISORDER, IN FULL REMISSION: Primary | ICD-10-CM

## 2019-06-05 PROBLEM — F32.A DEPRESSION: Status: ACTIVE | Noted: 2019-06-05

## 2019-06-05 PROCEDURE — 99214 PR OFFICE/OUTPT VISIT, EST, LEVL IV, 30-39 MIN: ICD-10-PCS | Mod: S$GLB,,, | Performed by: NURSE PRACTITIONER

## 2019-06-05 PROCEDURE — 99999 PR PBB SHADOW E&M-EST. PATIENT-LVL II: ICD-10-PCS | Mod: PBBFAC,,, | Performed by: NURSE PRACTITIONER

## 2019-06-05 PROCEDURE — 90853 PR GROUP PSYCHOTHERAPY: ICD-10-PCS | Mod: S$GLB,,, | Performed by: PSYCHOLOGIST

## 2019-06-05 PROCEDURE — 99214 OFFICE O/P EST MOD 30 MIN: CPT | Mod: S$GLB,,, | Performed by: NURSE PRACTITIONER

## 2019-06-05 PROCEDURE — 80305 PR COLLECTION ONLY DRUG SCREEN: ICD-10-PCS | Mod: S$GLB,,, | Performed by: EMERGENCY MEDICINE

## 2019-06-05 PROCEDURE — 80305 DRUG TEST PRSMV DIR OPT OBS: CPT | Mod: S$GLB,,, | Performed by: EMERGENCY MEDICINE

## 2019-06-05 PROCEDURE — 99999 PR PBB SHADOW E&M-EST. PATIENT-LVL II: CPT | Mod: PBBFAC,,, | Performed by: NURSE PRACTITIONER

## 2019-06-05 PROCEDURE — 90853 GROUP PSYCHOTHERAPY: CPT | Mod: S$GLB,,, | Performed by: PSYCHOLOGIST

## 2019-06-05 RX ORDER — TRAZODONE HYDROCHLORIDE 50 MG/1
50 TABLET ORAL DAILY
Qty: 30 TABLET | Refills: 3 | Status: SHIPPED | OUTPATIENT
Start: 2019-06-05 | End: 2019-10-03 | Stop reason: SDUPTHER

## 2019-06-05 NOTE — PROGRESS NOTES
Group Psychotherapy    Site: [facility]    Clinical status of patient: Outpatient    6/5/2019    Length of service:[id number]    Referred by: Addictive Behavior Unit     Number of patients in attendance: 5    Target symptoms: alcohol abuse    Patient's response to intervention:  The patient's response to intervention is active listening, self-disclosure.    Progress toward goals and other mental status changes:  The patient's progress toward goals is fair .    Interval history: Reports continued abstinence. Enjoyed recent trip to [place] for vacation. No urges to drink.    Diagnosis: alcohol use disorder, severe, dependence, in early remission    Plan: group psychotherapy, medication management by physician, AA, abstinence, compliance with RNP contract and UCS    Return to clinic: 1 week

## 2019-06-05 NOTE — PROGRESS NOTES
Subjective:       Patient ID: Glo Flores is a 48 y.o. female.    Chief Complaint: Affinity-Urine    Urine drug screen for affinity. Option 2.     ROS    Objective:      Physical Exam    Assessment:       1. Encounter for drug screening        Plan:                   No follow-ups on file.

## 2019-06-05 NOTE — PATIENT INSTRUCTIONS
Trazodone tablets  What is this medicine?  TRAZODONE (TRAZ oh done) is used to treat depression.  How should I use this medicine?  Take this medicine by mouth with a glass of water. Follow the directions on the prescription label. Take this medicine shortly after a meal or a light snack. Take your medicine at regular intervals. Do not take your medicine more often than directed. Do not stop taking this medicine suddenly except upon the advice of your doctor. Stopping this medicine too quickly may cause serious side effects or your condition may worsen.  A special MedGuide will be given to you by the pharmacist with each prescription and refill. Be sure to read this information carefully each time.  Talk to your pediatrician regarding the use of this medicine in children. Special care may be needed.  What side effects may I notice from receiving this medicine?  Side effects that you should report to your doctor or health care professional as soon as possible:  · allergic reactions like skin rash, itching or hives, swelling of the face, lips, or tongue  · fast, irregular heartbeat  · feeling faint or lightheaded, falls  · painful erections or other sexual dysfunction  · suicidal thoughts or other mood changes  · trembling  Side effects that usually do not require medical attention (report to your doctor or health care professional if they continue or are bothersome):  · constipation  · headache  · muscle aches or pains  · nausea, vomiting  · unusually weak or tired  What may interact with this medicine?  Do not take this medicine with any of the following medications:  · certain medicines for fungal infections like fluconazole, itraconazole, ketoconazole, posaconazole, voriconazole  · cisapride  · dofetilide  · dronedarone  · linezolid  · MAOIs like Carbex, Eldepryl, Marplan, Nardil, and Parnate  · mesoridazine  · methylene blue (injected into a vein)  · pimozide  · saquinavir  · thioridazine  · ziprasidone  This  medicine may also interact with the following medications:  · alcohol  · antiviral medicines for HIV or AIDS  · aspirin and aspirin-like medicines  · barbiturates like phenobarbital  · certain medicines for blood pressure, heart disease, irregular heart beat  · certain medicines for depression, anxiety, or psychotic disturbances  · certain medicines for migraine headache like almotriptan, eletriptan, frovatriptan, naratriptan, rizatriptan, sumatriptan, zolmitriptan  · certain medicines for seizures like carbamazepine and phenytoin  · certain medicines for sleep  · certain medicines that treat or prevent blood clots like dalteparin, enoxaparin, warfarin  · digoxin  · fentanyl  · lithium  · NSAIDS, medicines for pain and inflammation, like ibuprofen or naproxen  · other medicines that prolong the QT interval (cause an abnormal heart rhythm)  · rasagiline  · supplements like Rachel's wort, kava kava, valerian  · tramadol  · tryptophan  What if I miss a dose?  If you miss a dose, take it as soon as you can. If it is almost time for your next dose, take only that dose. Do not take double or extra doses.  Where should I keep my medicine?  Keep out of the reach of children.  Store at room temperature between 15 and 30 degrees C (59 to 86 degrees F). Protect from light. Keep container tightly closed. Throw away any unused medicine after the expiration date.  What should I tell my health care provider before I take this medicine?  They need to know if you have any of these conditions:  · attempted suicide or thinking about it  · bipolar disorder  · bleeding problems  · glaucoma  · heart disease, or previous heart attack  · irregular heart beat  · kidney or liver disease  · low levels of sodium in the blood  · an unusual or allergic reaction to trazodone, other medicines, foods, dyes or preservatives  · pregnant or trying to get pregnant  · breast-feeding  What should I watch for while using this medicine?  Tell your doctor  if your symptoms do not get better or if they get worse. Visit your doctor or health care professional for regular checks on your progress. Because it may take several weeks to see the full effects of this medicine, it is important to continue your treatment as prescribed by your doctor.  Patients and their families should watch out for new or worsening thoughts of suicide or depression. Also watch out for sudden changes in feelings such as feeling anxious, agitated, panicky, irritable, hostile, aggressive, impulsive, severely restless, overly excited and hyperactive, or not being able to sleep. If this happens, especially at the beginning of treatment or after a change in dose, call your health care professional.  You may get drowsy or dizzy. Do not drive, use machinery, or do anything that needs mental alertness until you know how this medicine affects you. Do not stand or sit up quickly, especially if you are an older patient. This reduces the risk of dizzy or fainting spells. Alcohol may interfere with the effect of this medicine. Avoid alcoholic drinks.  This medicine may cause dry eyes and blurred vision. If you wear contact lenses you may feel some discomfort. Lubricating drops may help. See your eye doctor if the problem does not go away or is severe.  Your mouth may get dry. Chewing sugarless gum, sucking hard candy and drinking plenty of water may help. Contact your doctor if the problem does not go away or is severe.  NOTE:This sheet is a summary. It may not cover all possible information. If you have questions about this medicine, talk to your doctor, pharmacist, or health care provider. Copyright© 2017 [employer]        Fluoxetine capsules or tablets (Depression/Mood Disorders)  What is this medicine?  FLUOXETINE (floo OX e teen) belongs to a class of drugs known as selective serotonin reuptake inhibitors (SSRIs). It helps to treat mood problems such as depression, obsessive compulsive disorder, and  panic attacks. It can also treat certain eating disorders.  How should I use this medicine?  Take this medicine by mouth with a glass of water. Follow the directions on the prescription label. You can take this medicine with or without food. Take your medicine at regular intervals. Do not take it more often than directed. Do not stop taking this medicine suddenly except upon the advice of your doctor. Stopping this medicine too quickly may cause serious side effects or your condition may worsen.  A special MedGuide will be given to you by the pharmacist with each prescription and refill. Be sure to read this information carefully each time.  Talk to your pediatrician regarding the use of this medicine in children. While this drug may be prescribed for children as young as 7 years for selected conditions, precautions do apply.  What side effects may I notice from receiving this medicine?  Side effects that you should report to your doctor or health care professional as soon as possible:  · allergic reactions like skin rash, itching or hives, swelling of the face, lips, or tongue  · breathing problems  · confusion  · eye pain, changes in vision  · fast or irregular heart rate, palpitations  · flu-like fever, chills, cough, muscle or joint aches and pains  · seizures  · suicidal thoughts or other mood changes  · swelling or redness in or around the eye  · tremors  · trouble sleeping  · unusual bleeding or bruising  · unusually tired or weak  · vomiting  Side effects that usually do not require medical attention (report to your doctor or health care professional if they continue or are bothersome):  · change in sex drive or performance  · diarrhea  · dry mouth  · flushing  · headache  · increased or decreased appetite  · nausea  · sweating  What may interact with this medicine?  Do not take fluoxetine with any of the following medications:  · other medicines containing fluoxetine, like Sarafem or  Symbyax  · cisapride  · linezolid  · MAOIs like Carbex, Eldepryl, Marplan, Nardil, and Parnate  · methylene blue (injected into a vein)  · pimozide  · thioridazine  This medicine may also interact with the following medications:  · alcohol  · aspirin and aspirin-like medicines  · carbamazepine  · certain medicines for depression, anxiety, or psychotic disturbances  · certain medicines for migraine headaches like almotriptan, eletriptan, frovatriptan, naratriptan, rizatriptan, sumatriptan, zolmitriptan  · digoxin  · diuretics  · fentanyl  · flecainide  · furazolidone  · isoniazid  · lithium  · medicines for sleep  · medicines that treat or prevent blood clots like warfarin, enoxaparin, and dalteparin  · NSAIDs, medicines for pain and inflammation, like ibuprofen or naproxen  · phenytoin  · procarbazine  · propafenone  · rasagiline  · ritonavir  · supplements like Rachel's wort, kava kava, valerian  · tramadol  · tryptophan  · vinblastine  What if I miss a dose?  If you miss a dose, skip the missed dose and go back to your regular dosing schedule. Do not take double or extra doses.  Where should I keep my medicine?  Keep out of the reach of children.  Store at room temperature between 15 and 30 degrees C (59 and 86 degrees F). Throw away any unused medicine after the expiration date.  What should I tell my health care provider before I take this medicine?  They need to know if you have any of these conditions:  · bipolar disorder or wilma  · diabetes  · glaucoma  · liver disease  · psychosis  · seizures  · suicidal thoughts or history of attempted suicide  · an unusual or allergic reaction to fluoxetine, other medicines, foods, dyes, or preservatives  · pregnant or trying to get pregnant  · breast-feeding  What should I watch for while using this medicine?  Tell your doctor if your symptoms do not get better or if they get worse. Visit your doctor or health care professional for regular checks on your progress.  Because it may take several weeks to see the full effects of this medicine, it is important to continue your treatment as prescribed by your doctor.  Patients and their families should watch out for new or worsening thoughts of suicide or depression. Also watch out for sudden changes in feelings such as feeling anxious, agitated, panicky, irritable, hostile, aggressive, impulsive, severely restless, overly excited and hyperactive, or not being able to sleep. If this happens, especially at the beginning of treatment or after a change in dose, call your health care professional.  You may get drowsy or dizzy. Do not drive, use machinery, or do anything that needs mental alertness until you know how this medicine affects you. Do not stand or sit up quickly, especially if you are an older patient. This reduces the risk of dizzy or fainting spells. Alcohol may interfere with the effect of this medicine. Avoid alcoholic drinks.  Your mouth may get dry. Chewing sugarless gum or sucking hard candy, and drinking plenty of water may help. Contact your doctor if the problem does not go away or is severe.  This medicine may affect blood sugar levels. If you have diabetes, check with your doctor or health care professional before you change your diet or the dose of your diabetic medicine.  NOTE:This sheet is a summary. It may not cover all possible information. If you have questions about this medicine, talk to your doctor, pharmacist, or health care provider. Copyright© 2017 [employer]

## 2019-06-05 NOTE — PROGRESS NOTES
"Outpatient Psychiatry Follow-Up Visit (MD/NP)    6/5/2019    Clinical Status of Patient:  Outpatient (Ambulatory)    Chief Complaint:  Glo Flores is a 48 y.o. female who presents today for follow-up of depression, anxiety and substance problems.  Met with patient.      Interval History and Content of Current Session:  Interim Events/Subjective Report/Content of Current Session:   She described mood as "very good" and affect was mood congruent. 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 She is sober for 9 months and feels better about her job due to sobriety. She continues to attend AA meetings, RNP, ABU aftercare groups and adheres to all elements of her treatment plan. She has insights about her behavior and the disease of addiction. She reported that she learned effective coping skills and she utilizes them in her daily life. She reported medication compliance and denied any side effects to her current medication regimen. She reported improved depression, anxiety and sleep with her current medications of prozac 20mg daily and trazodone 50mg nightly. Patient requested to keep the same dosage at this time due to its effectiveness.  Patient denied SI/HI/AH/VH and no delusion noted or reported. Patient denied ever having symptoms of wilma or hypomania. She reported no alcohol or drug use.      Psychotherapy:  · Target symptoms: alcohol abuse, depression, anxiety   · Why chosen therapy is appropriate versus another modality: relevant to diagnosis, patient responds to this modality, evidence based practice  · Outcome monitoring methods: self-report, observation  · Therapeutic intervention type: insight oriented psychotherapy, behavior modifying psychotherapy, supportive psychotherapy, interactive psychotherapy  · Topics discussed/themes: building skills sets for symptom management, symptom recognition  · The patient's response to the intervention is motivated. The patient's progress toward treatment goals is " "good.   · Duration of intervention: 14 minutes.    Review of Systems   · PSYCHIATRIC: Pertinant items are noted in the narrative.  · CONSTITUTIONAL: No weight gain or loss.   · MUSCULOSKELETAL: No pain or stiffness of the joints.  · NEUROLOGIC: No weakness, sensory changes, seizures, confusion, memory loss, tremor or other abnormal movements.  · ENDOCRINE: No polydipsia or polyuria.  · INTEGUMENTARY: No rashes or lacerations.  · EYES: No exophthalmos, jaundice or blindness.  · ENT: No dizziness, tinnitus or hearing loss.  · RESPIRATORY: No shortness of breath.  · CARDIOVASCULAR: No tachycardia or chest pain.  · GASTROINTESTINAL: No nausea, vomiting, pain, constipation or diarrhea.  · GENITOURINARY: No frequency, dysuria or sexual dysfunction.  · HEMATOLOGIC/LYMPHATIC: No excessive bleeding, prolonged or excessive bleeding after dental extraction/injury.  · ALLERGIC/IMMUNOLOGIC: No allergic response to materials, foods or animals at this time.    Past Medical, Family and Social History: The patient's past medical, family and social history have been reviewed and updated as appropriate within the electronic medical record - see encounter notes.    Compliance: yes    Side effects: None    Risk Parameters:  Patient reports no suicidal ideation  Patient reports no homicidal ideation  Patient reports no self-injurious behavior  Patient reports no violent behavior    Exam (detailed: at least 9 elements; comprehensive: all 15 elements)   Constitutional  Vitals:  Most recent vital signs, dated greater than 90 days prior to this appointment, were reviewed.   Vitals:    06/05/19 0855   BP: 126/72   Pulse: 87   Weight: 85.5 kg (188 lb 6.1 oz)   Height: 5' 4" (1.626 m)        General:  unremarkable, age appropriate     Musculoskeletal  Muscle Strength/Tone:  no dystonia, no tremor, no tic   Gait & Station:  non-ataxic     Psychiatric  Speech:  no latency; no press   Mood & Affect:  "very good"  congruent and appropriate "   Thought Process:  normal and logical   Associations:  intact   Thought Content:  normal, no suicidality, no homicidality, delusions, or paranoia   Insight:  intact   Judgement: behavior is adequate to circumstances   Orientation:  grossly intact   Memory: intact for content of interview   Language: grossly intact   Attention Span & Concentration:  able to focus   Fund of Knowledge:  intact and appropriate to age and level of education     Assessment and Diagnosis   Status/Progress: Based on the examination today, the patient's problem(s) is/are improved.  New problems have not been presented today.   Co-morbidities, Diagnostic uncertainty and Lack of compliance are not complicating management of the primary condition.  There are no active rule-out diagnoses for this patient at this time.     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 Remains sober and euthymic. 6/5/19 presented as upbeat and bright. She denied feeling depressed or anxious. She reported doing well at her job and enjoying her sobriety. She is 9 months sober, attends AA meetings, RNP, ABU aftercare, and compliant with her medications. No side effects reported or noted. Reported trazodone is effective in treating her insomnia. Will continue the same medication regimen. RTC in 3 months.         ICD-10-CM ICD-9-CM   1. Recurrent major depressive disorder, in full remission F33.42 296.36   2. Insomnia, unspecified type G47.00 780.52   3. Alcohol use disorder, severe, in early remission, dependence F10.21 303.93       Intervention/Counseling/Treatment Plan   · Medication Management: Continue current medications. The risks and benefits of medication were discussed with the patient.   -continue prozac 20 mg daily for depression  -continue trazodone 50 mg qhs for insomnia  -continue monitoring per RNP, other therapy, ABU  aftercare and regular AA  -return in 3 months for follow up  -Discussed informed consent, diagnosis, risks and benefits of proposed treatment above vs alternative treatments vs no treatment, and potential side effects of these treatments. The patient expresses understanding of the above and displays the capacity to agree with this treatment given said understanding. Patient also agrees that, currently, the benefits outweigh the risks and would like to pursue treatment at this time. Answered all questions and discussed follow up. Encouraged patient to contact us with any questions or concerns.   -Encouraged Patient to keep future appointments.  -Take medications as prescribed and abstain from substance abuse.  -Pt to present to ED for thoughts to harm herself or others              Return to Clinic: 3 months

## 2019-06-12 ENCOUNTER — OFFICE VISIT (OUTPATIENT)
Dept: PSYCHIATRY | Facility: CLINIC | Age: 49
End: 2019-06-12
Payer: COMMERCIAL

## 2019-06-12 DIAGNOSIS — F10.21 ALCOHOL USE DISORDER, SEVERE, IN EARLY REMISSION: Primary | ICD-10-CM

## 2019-06-12 PROCEDURE — 90853 GROUP PSYCHOTHERAPY: CPT | Mod: S$GLB,,, | Performed by: PSYCHOLOGIST

## 2019-06-12 PROCEDURE — 90853 PR GROUP PSYCHOTHERAPY: ICD-10-PCS | Mod: S$GLB,,, | Performed by: PSYCHOLOGIST

## 2019-06-12 NOTE — PROGRESS NOTES
"Group Psychotherapy    Site: [facility]    Clinical status of patient: Outpatient    6/12/2019    Length of service:[id number]    Referred by: Add[facility]ive Behavior [facility]     Number of patients in attendance: 5    Target symptoms: alcohol abuse    Patient's response to intervention:  The patient's response to intervention is active listening, self-disclosure.    Progress toward goals and other mental status changes:  The patient's progress toward goals is fair .    Interval history: Reports continued abstinence. Discussed sponsor "firing" her as their schedules no longer mesh. Has another lady in mind.    Diagnosis: alcohol use disorder, severe, dependence, in early remission    Plan: group psychotherapy, medication management by physician, AA, abstinence, compliance with RNP contract and UCS    Return to clinic: 1 week  "

## 2019-06-14 ENCOUNTER — OCCUPATIONAL HEALTH (OUTPATIENT)
Dept: URGENT CARE | Facility: CLINIC | Age: 49
End: 2019-06-14

## 2019-06-14 DIAGNOSIS — Z02.83 ENCOUNTER FOR DRUG SCREENING: Primary | ICD-10-CM

## 2019-06-14 PROCEDURE — 80305 PR COLLECTION ONLY DRUG SCREEN: ICD-10-PCS | Mod: S$GLB,,, | Performed by: EMERGENCY MEDICINE

## 2019-06-14 PROCEDURE — 80305 DRUG TEST PRSMV DIR OPT OBS: CPT | Mod: S$GLB,,, | Performed by: EMERGENCY MEDICINE

## 2019-06-19 ENCOUNTER — OFFICE VISIT (OUTPATIENT)
Dept: PSYCHIATRY | Facility: CLINIC | Age: 49
End: 2019-06-19
Payer: COMMERCIAL

## 2019-06-19 DIAGNOSIS — F10.21 ALCOHOL USE DISORDER, SEVERE, IN EARLY REMISSION: Primary | ICD-10-CM

## 2019-06-19 PROCEDURE — 90853 GROUP PSYCHOTHERAPY: CPT | Mod: S$GLB,,, | Performed by: PSYCHOLOGIST

## 2019-06-19 PROCEDURE — 90853 PR GROUP PSYCHOTHERAPY: ICD-10-PCS | Mod: S$GLB,,, | Performed by: PSYCHOLOGIST

## 2019-06-19 NOTE — PROGRESS NOTES
Group Psychotherapy    Site: [facility]    Clinical status of patient: Outpatient    6/19/2019    Length of service:[id number]    Referred by: Addictive Behavior Unit     Number of patients in attendance: 4    Target symptoms: alcohol abuse    Patient's response to intervention:  The patient's response to intervention is active listening, self-disclosure.    Progress toward goals and other mental status changes:  The patient's progress toward goals is fair .    Interval history: Reports continued abstinence. Shared her story with new group member. Has a new sponsor.    Diagnosis: alcohol use disorder, severe, dependence, in early remission    Plan: group psychotherapy, medication management by physician, AA, abstinence, compliance with RNP contract and UCS    Return to clinic: 1 week

## 2019-06-26 ENCOUNTER — OFFICE VISIT (OUTPATIENT)
Dept: PSYCHIATRY | Facility: CLINIC | Age: 49
End: 2019-06-26
Payer: COMMERCIAL

## 2019-06-26 DIAGNOSIS — F10.21 ALCOHOL USE DISORDER, SEVERE, IN EARLY REMISSION: Primary | ICD-10-CM

## 2019-06-26 PROCEDURE — 90853 PR GROUP PSYCHOTHERAPY: ICD-10-PCS | Mod: S$GLB,,, | Performed by: PSYCHOLOGIST

## 2019-06-26 PROCEDURE — 90853 GROUP PSYCHOTHERAPY: CPT | Mod: S$GLB,,, | Performed by: PSYCHOLOGIST

## 2019-06-26 NOTE — PROGRESS NOTES
Group Psychotherapy    Site: [facility]    Clinical status of patient: Outpatient    6/26/2019    Length of service:[id number]    Referred by: Addictive Behavior Unit     Number of patients in attendance: 5    Target symptoms: alcohol abuse    Patient's response to intervention:  The patient's response to intervention is active listening, self-disclosure.    Progress toward goals and other mental status changes:  The patient's progress toward goals is fair .    Interval history: Reports continued abstinence. Discussed how to have a positive attitude about the RNP.    Diagnosis: alcohol use disorder, severe, dependence, in early remission    Plan: group psychotherapy, medication management by physician, AA, abstinence, compliance with RNP contract and UCS    Return to clinic: 1 week

## 2019-07-17 ENCOUNTER — OFFICE VISIT (OUTPATIENT)
Dept: PSYCHIATRY | Facility: CLINIC | Age: 49
End: 2019-07-17
Payer: COMMERCIAL

## 2019-07-17 DIAGNOSIS — F10.21 ALCOHOL USE DISORDER, SEVERE, IN EARLY REMISSION: Primary | ICD-10-CM

## 2019-07-17 PROCEDURE — 90853 PR GROUP PSYCHOTHERAPY: ICD-10-PCS | Mod: S$GLB,,, | Performed by: PSYCHOLOGIST

## 2019-07-17 PROCEDURE — 90853 GROUP PSYCHOTHERAPY: CPT | Mod: S$GLB,,, | Performed by: PSYCHOLOGIST

## 2019-07-17 NOTE — PROGRESS NOTES
Group Psychotherapy    Site: [facility]    Clinical status of patient: Outpatient    7/17/2019    Length of service:[id number]    Referred by: Addictive Behavior Unit     Number of patients in attendance: 5    Target symptoms: alcohol abuse    Patient's response to intervention:  The patient's response to intervention is active listening, self-disclosure.    Progress toward goals and other mental status changes:  The patient's progress toward goals is fair .    Interval history: Reports continued abstinence. Discussed continuing to work on not getting involved in things that are not hers.    Diagnosis: alcohol use disorder, severe, dependence, in early remission    Plan: group psychotherapy, medication management by physician, AA, abstinence, compliance with RNP contract and UCS    Return to clinic: 1 week

## 2019-07-24 ENCOUNTER — OFFICE VISIT (OUTPATIENT)
Dept: PSYCHIATRY | Facility: CLINIC | Age: 49
End: 2019-07-24
Payer: COMMERCIAL

## 2019-07-24 DIAGNOSIS — F10.21 ALCOHOL USE DISORDER, SEVERE, IN EARLY REMISSION: Primary | ICD-10-CM

## 2019-07-24 PROCEDURE — 90853 GROUP PSYCHOTHERAPY: CPT | Mod: S$GLB,,, | Performed by: PSYCHOLOGIST

## 2019-07-24 PROCEDURE — 90853 PR GROUP PSYCHOTHERAPY: ICD-10-PCS | Mod: S$GLB,,, | Performed by: PSYCHOLOGIST

## 2019-07-24 NOTE — PROGRESS NOTES
Group Psychotherapy    Site: [facility]    Clinical status of patient: Outpatient    7/24/2019    Length of service:[id number]    Referred by: Addictive Behavior Unit     Number of patients in attendance: 5    Target symptoms: alcohol abuse    Patient's response to intervention:  The patient's response to intervention is active listening, self-disclosure.    Progress toward goals and other mental status changes:  The patient's progress toward goals is fair .    Interval history: Reports continued abstinence. Discussed how she will stay sober on upcoming cruise.    Diagnosis: alcohol use disorder, severe, dependence, in early remission    Plan: group psychotherapy, medication management by physician, AA, abstinence, compliance with RNP contract and UCS    Return to clinic: 1 week

## 2019-07-31 ENCOUNTER — OFFICE VISIT (OUTPATIENT)
Dept: PSYCHIATRY | Facility: CLINIC | Age: 49
End: 2019-07-31
Payer: COMMERCIAL

## 2019-07-31 DIAGNOSIS — F10.21 ALCOHOL USE DISORDER, SEVERE, IN EARLY REMISSION: Primary | ICD-10-CM

## 2019-07-31 PROCEDURE — 90853 GROUP PSYCHOTHERAPY: CPT | Mod: S$GLB,,, | Performed by: PSYCHOLOGIST

## 2019-07-31 PROCEDURE — 90853 PR GROUP PSYCHOTHERAPY: ICD-10-PCS | Mod: S$GLB,,, | Performed by: PSYCHOLOGIST

## 2019-07-31 NOTE — PROGRESS NOTES
Group Psychotherapy    Site: [facility]    Clinical status of patient: Outpatient    7/31/2019    Length of service:[id number]    Referred by: Addictive Behavior Unit     Number of patients in attendance: 5    Target symptoms: alcohol abuse    Patient's response to intervention:  The patient's response to intervention is active listening, self-disclosure.    Progress toward goals and other mental status changes:  The patient's progress toward goals is fair .    Interval history: Reports continued abstinence. Discussed using the Serenity Prayer in her life. Got 11 month chip today.    Diagnosis: alcohol use disorder, severe, dependence, in early remission    Plan: group psychotherapy, medication management by physician, AA, abstinence, compliance with RNP contract and UCS    Return to clinic: 1 week

## 2019-08-07 ENCOUNTER — OFFICE VISIT (OUTPATIENT)
Dept: PSYCHIATRY | Facility: CLINIC | Age: 49
End: 2019-08-07
Payer: COMMERCIAL

## 2019-08-07 DIAGNOSIS — F10.21 ALCOHOL USE DISORDER, SEVERE, IN EARLY REMISSION: Primary | ICD-10-CM

## 2019-08-07 PROCEDURE — 90853 PR GROUP PSYCHOTHERAPY: ICD-10-PCS | Mod: S$GLB,,, | Performed by: PSYCHOLOGIST

## 2019-08-07 PROCEDURE — 90853 GROUP PSYCHOTHERAPY: CPT | Mod: S$GLB,,, | Performed by: PSYCHOLOGIST

## 2019-08-07 NOTE — PROGRESS NOTES
Group Psychotherapy    Site: [facility]    Clinical status of patient: Outpatient    8/7/2019    Length of service:[id number]    Referred by: Addictive Behavior Unit     Number of patients in attendance: 6    Target symptoms: alcohol abuse    Patient's response to intervention:  The patient's response to intervention is active listening, self-disclosure.    Progress toward goals and other mental status changes:  The patient's progress toward goals is fair .    Interval history: Reports continued abstinence. Discussed [place] as a good experience.    Diagnosis: alcohol use disorder, severe, dependence, in early remission    Plan: group psychotherapy, medication management by physician, AA, abstinence, compliance with RNP contract and UCS    Return to clinic: 1 week

## 2019-08-09 ENCOUNTER — OCCUPATIONAL HEALTH (OUTPATIENT)
Dept: URGENT CARE | Facility: CLINIC | Age: 49
End: 2019-08-09

## 2019-08-09 DIAGNOSIS — Z02.83 ENCOUNTER FOR DRUG SCREENING: Primary | ICD-10-CM

## 2019-08-09 PROCEDURE — 80305 DRUG TEST PRSMV DIR OPT OBS: CPT | Mod: S$GLB,,, | Performed by: EMERGENCY MEDICINE

## 2019-08-09 PROCEDURE — 80305 PR COLLECTION ONLY DRUG SCREEN: ICD-10-PCS | Mod: S$GLB,,, | Performed by: EMERGENCY MEDICINE

## 2019-08-09 NOTE — PROGRESS NOTES
Subjective:       Patient ID: Glo Flores is a 49 y.o. female.    Chief Complaint: Affinity Urine-Option 9    Affinity urine drug screen collection; option 9. [employer] confirmation [phone]    ROS    Objective:      Physical Exam    Assessment:       1. Encounter for drug screening        Plan:                   No follow-ups on file.

## 2019-08-14 ENCOUNTER — OFFICE VISIT (OUTPATIENT)
Dept: PSYCHIATRY | Facility: CLINIC | Age: 49
End: 2019-08-14
Payer: COMMERCIAL

## 2019-08-14 DIAGNOSIS — F10.21 ALCOHOL USE DISORDER, SEVERE, IN EARLY REMISSION: Primary | ICD-10-CM

## 2019-08-14 PROCEDURE — 90853 PR GROUP PSYCHOTHERAPY: ICD-10-PCS | Mod: S$GLB,,, | Performed by: PSYCHOLOGIST

## 2019-08-14 PROCEDURE — 90853 GROUP PSYCHOTHERAPY: CPT | Mod: S$GLB,,, | Performed by: PSYCHOLOGIST

## 2019-08-14 NOTE — PROGRESS NOTES
Group Psychotherapy    Site: [facility]    Clinical status of patient: Outpatient    8/14/2019    Length of service:[id number]    Referred by: Addictive Behavior Unit     Number of patients in attendance: 6    Target symptoms: alcohol abuse    Patient's response to intervention:  The patient's response to intervention is active listening, self-disclosure.    Progress toward goals and other mental status changes:  The patient's progress toward goals is fair .    Interval history: Reports continued sobriety. Shared her story with new group member.    Diagnosis: alcohol use disorder, severe, dependence, in early remission    Plan: group psychotherapy, medication management by physician, AA, abstinence, compliance with RNP contract and UCS    Return to clinic: 1 week

## 2019-08-21 ENCOUNTER — PATIENT MESSAGE (OUTPATIENT)
Dept: PSYCHIATRY | Facility: CLINIC | Age: 49
End: 2019-08-21

## 2019-08-21 ENCOUNTER — OFFICE VISIT (OUTPATIENT)
Dept: PSYCHIATRY | Facility: CLINIC | Age: 49
End: 2019-08-21
Payer: COMMERCIAL

## 2019-08-21 DIAGNOSIS — F10.21 ALCOHOL USE DISORDER, SEVERE, IN EARLY REMISSION, DEPENDENCE: Primary | ICD-10-CM

## 2019-08-21 PROCEDURE — 90853 PR GROUP PSYCHOTHERAPY: ICD-10-PCS | Mod: S$GLB,,, | Performed by: PSYCHOLOGIST

## 2019-08-21 PROCEDURE — 90853 GROUP PSYCHOTHERAPY: CPT | Mod: S$GLB,,, | Performed by: PSYCHOLOGIST

## 2019-08-22 NOTE — PROGRESS NOTES
Group Psychotherapy    Site: [facility]    Clinical status of patient: Outpatient    8/21/2019    Length of service:[id number]    Referred by: Addictive Behavior Unit     Number of patients in attendance: 5    Target symptoms: alcohol abuse    Patient's response to intervention:  The patient's response to intervention is active listening, self-disclosure.    Progress toward goals and other mental status changes:  The patient's progress toward goals is fair .    Interval history: Reports continued sobriety. Working on her relapse prevention plan for the RNP.    Diagnosis: alcohol use disorder, severe, dependence, in early remission    Plan: group psychotherapy, medication management by physician, AA, abstinence, compliance with RNP contract and UCS    Return to clinic: 1 week

## 2019-08-28 ENCOUNTER — OFFICE VISIT (OUTPATIENT)
Dept: PSYCHIATRY | Facility: CLINIC | Age: 49
End: 2019-08-28
Payer: COMMERCIAL

## 2019-08-28 DIAGNOSIS — F10.21 ALCOHOL USE DISORDER, SEVERE, IN EARLY REMISSION, DEPENDENCE: Primary | ICD-10-CM

## 2019-08-28 PROCEDURE — 90853 GROUP PSYCHOTHERAPY: CPT | Mod: S$GLB,,, | Performed by: PSYCHOLOGIST

## 2019-08-28 PROCEDURE — 90853 PR GROUP PSYCHOTHERAPY: ICD-10-PCS | Mod: S$GLB,,, | Performed by: PSYCHOLOGIST

## 2019-08-28 NOTE — PROGRESS NOTES
Group Psychotherapy    Site: [facility]    Clinical status of patient: Outpatient    8/28/2019    Length of service:[id number]    Referred by: Addictive Behavior Unit     Number of patients in attendance: 4    Target symptoms: alcohol abuse    Patient's response to intervention:  The patient's response to intervention is active listening, self-disclosure.    Progress toward goals and other mental status changes:  The patient's progress toward goals is fair .    Interval history: Reports continued sobriety. Discussed good feedback at work.    Diagnosis: alcohol use disorder, severe, dependence, in early remission    Plan: group psychotherapy, medication management by physician, AA, abstinence, compliance with RNP contract and UCS    Return to clinic: 1 week

## 2019-09-04 ENCOUNTER — PATIENT MESSAGE (OUTPATIENT)
Dept: PSYCHIATRY | Facility: CLINIC | Age: 49
End: 2019-09-04

## 2019-09-04 ENCOUNTER — OFFICE VISIT (OUTPATIENT)
Dept: PSYCHIATRY | Facility: CLINIC | Age: 49
End: 2019-09-04
Payer: COMMERCIAL

## 2019-09-04 DIAGNOSIS — F10.21 ALCOHOL USE DISORDER, SEVERE, IN SUSTAINED REMISSION, DEPENDENCE: Primary | ICD-10-CM

## 2019-09-04 PROCEDURE — 90853 PR GROUP PSYCHOTHERAPY: ICD-10-PCS | Mod: S$GLB,,, | Performed by: PSYCHOLOGIST

## 2019-09-04 PROCEDURE — 90853 GROUP PSYCHOTHERAPY: CPT | Mod: S$GLB,,, | Performed by: PSYCHOLOGIST

## 2019-09-04 NOTE — PROGRESS NOTES
Group Psychotherapy    Site: [facility]    Clinical status of patient: Outpatient    9/4/2019    Length of service:[id number]    Referred by: Addictive Behavior Unit     Number of patients in attendance: 4    Target symptoms: alcohol abuse    Patient's response to intervention:  The patient's response to intervention is active listening, self-disclosure.    Progress toward goals and other mental status changes:  The patient's progress toward goals is fair .    Interval history: Reports continued sobriety. Sober 1 year! Going on cruise next week. Several strategies to stay sober.    Diagnosis: alcohol use disorder, severe, dependence, in sustained remission    Plan: group psychotherapy, medication management by physician, AA, abstinence, compliance with RNP contract and UCS    Return to clinic: 1 week

## 2019-09-05 ENCOUNTER — CLINICAL SUPPORT (OUTPATIENT)
Dept: URGENT CARE | Facility: CLINIC | Age: 49
End: 2019-09-05

## 2019-09-05 DIAGNOSIS — Z76.89 ENCOUNTER FOR ASSESSMENT OF ALCOHOL AND DRUG USE: Primary | ICD-10-CM

## 2019-09-05 PROCEDURE — 80305 PR COLLECTION ONLY DRUG SCREEN: ICD-10-PCS | Mod: S$GLB,,, | Performed by: EMERGENCY MEDICINE

## 2019-09-05 PROCEDURE — 80305 DRUG TEST PRSMV DIR OPT OBS: CPT | Mod: S$GLB,,, | Performed by: EMERGENCY MEDICINE

## 2019-09-16 ENCOUNTER — PATIENT MESSAGE (OUTPATIENT)
Dept: PSYCHIATRY | Facility: CLINIC | Age: 49
End: 2019-09-16

## 2019-09-18 ENCOUNTER — OFFICE VISIT (OUTPATIENT)
Dept: PSYCHIATRY | Facility: CLINIC | Age: 49
End: 2019-09-18
Payer: COMMERCIAL

## 2019-09-18 DIAGNOSIS — F10.21 ALCOHOL USE DISORDER, SEVERE, IN SUSTAINED REMISSION, DEPENDENCE: Primary | ICD-10-CM

## 2019-09-18 PROCEDURE — 90853 GROUP PSYCHOTHERAPY: CPT | Mod: S$GLB,,, | Performed by: PSYCHOLOGIST

## 2019-09-18 PROCEDURE — 90853 PR GROUP PSYCHOTHERAPY: ICD-10-PCS | Mod: S$GLB,,, | Performed by: PSYCHOLOGIST

## 2019-09-18 NOTE — PROGRESS NOTES
Group Psychotherapy    Site: [facility]    Clinical status of patient: Outpatient    9/18/2019    Length of service:[id number]    Referred by: Addictive Behavior Unit     Number of patients in attendance: 7    Target symptoms: alcohol abuse    Patient's response to intervention:  The patient's response to intervention is active listening, self-disclosure.    Progress toward goals and other mental status changes:  The patient's progress toward goals is fair .    Interval history: Reports continued sobriety. Shared her story with new group member. Discussed how she stayed sober on the cruise.    Diagnosis: alcohol use disorder, severe, dependence, in sustained remission    Plan: group psychotherapy, medication management by physician, AA, abstinence, compliance with RNP contract and UCS    Return to clinic: 1 week

## 2019-09-20 ENCOUNTER — CLINICAL SUPPORT (OUTPATIENT)
Dept: URGENT CARE | Facility: CLINIC | Age: 49
End: 2019-09-20

## 2019-09-20 DIAGNOSIS — Z02.83 ENCOUNTER FOR DRUG SCREENING: Primary | ICD-10-CM

## 2019-09-20 PROCEDURE — 80305 PR COLLECTION ONLY DRUG SCREEN: ICD-10-PCS | Mod: S$GLB,,, | Performed by: EMERGENCY MEDICINE

## 2019-09-20 PROCEDURE — 80305 DRUG TEST PRSMV DIR OPT OBS: CPT | Mod: S$GLB,,, | Performed by: EMERGENCY MEDICINE

## 2019-09-20 NOTE — PROGRESS NOTES
Subjective:       Patient ID: Glo Flores is a 49 y.o. female.    Chief Complaint: Affinity Option 9    Affinity option 9  fedex called [facility]    ROS    Objective:      Physical Exam    Assessment:       No diagnosis found.    Plan:                   No follow-ups on file.

## 2019-09-25 ENCOUNTER — OFFICE VISIT (OUTPATIENT)
Dept: PSYCHIATRY | Facility: CLINIC | Age: 49
End: 2019-09-25
Payer: COMMERCIAL

## 2019-09-25 DIAGNOSIS — F10.21 ALCOHOL USE DISORDER, SEVERE, IN SUSTAINED REMISSION, DEPENDENCE: Primary | ICD-10-CM

## 2019-09-25 PROCEDURE — 90853 GROUP PSYCHOTHERAPY: CPT | Mod: S$GLB,,, | Performed by: PSYCHOLOGIST

## 2019-09-25 PROCEDURE — 90853 PR GROUP PSYCHOTHERAPY: ICD-10-PCS | Mod: S$GLB,,, | Performed by: PSYCHOLOGIST

## 2019-09-25 NOTE — PROGRESS NOTES
Group Psychotherapy    Site: [facility]    Clinical status of patient: Outpatient    9/25/2019    Length of service:[id number]    Referred by: Addictive Behavior Unit     Number of patients in attendance: 4    Target symptoms: alcohol abuse    Patient's response to intervention:  The patient's response to intervention is active listening, self-disclosure.    Progress toward goals and other mental status changes:  The patient's progress toward goals is fair .    Interval history: Reports continued sobriety. Discussed ways 12-step practices have improved her overall life. Incorporates the serenity prayer regularly to help cope with stress. .    Diagnosis: alcohol use disorder, severe, dependence, in sustained remission    Plan: group psychotherapy, medication management by physician, AA, abstinence, compliance with RNP contract and UCS    Return to clinic: 1 week  
no

## 2019-10-02 ENCOUNTER — OFFICE VISIT (OUTPATIENT)
Dept: PSYCHIATRY | Facility: CLINIC | Age: 49
End: 2019-10-02
Payer: COMMERCIAL

## 2019-10-02 DIAGNOSIS — F10.21 ALCOHOL USE DISORDER, SEVERE, IN SUSTAINED REMISSION, DEPENDENCE: Primary | ICD-10-CM

## 2019-10-02 PROCEDURE — 90853 GROUP PSYCHOTHERAPY: CPT | Mod: S$GLB,,, | Performed by: PSYCHOLOGIST

## 2019-10-02 PROCEDURE — 90853 PR GROUP PSYCHOTHERAPY: ICD-10-PCS | Mod: S$GLB,,, | Performed by: PSYCHOLOGIST

## 2019-10-02 NOTE — PROGRESS NOTES
Group Psychotherapy    Site: [facility]    Clinical status of patient: Outpatient    10/2/2019    Length of service:[id number]    Referred by: Addictive Behavior Unit     Number of patients in attendance: 5    Target symptoms: alcohol abuse    Patient's response to intervention:  The patient's response to intervention is active listening, self-disclosure.    Progress toward goals and other mental status changes:  The patient's progress toward goals is fair .    Interval history: Reports continued sobriety. Pt completed her relapse prevention plan and presented it to her RNP group; she received good feedback. 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     Diagnosis: alcohol use disorder, severe, dependence, in sustained remission    Plan: group psychotherapy, medication management by physician, AA, abstinence, compliance with RNP contract and UCS    Return to clinic: 1 week

## 2019-10-03 DIAGNOSIS — F33.42 RECURRENT MAJOR DEPRESSIVE DISORDER, IN FULL REMISSION: ICD-10-CM

## 2019-10-03 RX ORDER — TRAZODONE HYDROCHLORIDE 50 MG/1
TABLET ORAL
Qty: 30 TABLET | Refills: 0 | Status: SHIPPED | OUTPATIENT
Start: 2019-10-03 | End: 2019-10-28 | Stop reason: SDUPTHER

## 2019-10-09 ENCOUNTER — OFFICE VISIT (OUTPATIENT)
Dept: PSYCHIATRY | Facility: CLINIC | Age: 49
End: 2019-10-09
Payer: COMMERCIAL

## 2019-10-09 DIAGNOSIS — F10.21 ALCOHOL USE DISORDER, SEVERE, IN SUSTAINED REMISSION, DEPENDENCE: Primary | ICD-10-CM

## 2019-10-09 PROCEDURE — 90853 GROUP PSYCHOTHERAPY: CPT | Mod: S$GLB,,, | Performed by: PSYCHOLOGIST

## 2019-10-09 PROCEDURE — 90853 PR GROUP PSYCHOTHERAPY: ICD-10-PCS | Mod: S$GLB,,, | Performed by: PSYCHOLOGIST

## 2019-10-09 NOTE — PROGRESS NOTES
Group Psychotherapy    Site: [facility]    Clinical status of patient: Outpatient    10/9/2019    Length of service:[id number]    Referred by: Addictive Behavior Unit     Number of patients in attendance: 2    Target symptoms: alcohol abuse    Patient's response to intervention:  The patient's response to intervention is active listening, self-disclosure.    Progress toward goals and other mental status changes:  The patient's progress toward goals is fair .    Interval history: Reports continued sobriety. Discussed the dangers of resentment for recovery and the way she has learned to overcome resentments by seeing things from the point of view of others and depersonalizing their behavior.     Diagnosis: alcohol use disorder, severe, dependence, in sustained remission    Plan: group psychotherapy, medication management by physician, AA, abstinence, compliance with RNP contract and UCS    Return to clinic: 1 week

## 2019-10-11 ENCOUNTER — CLINICAL SUPPORT (OUTPATIENT)
Dept: URGENT CARE | Facility: CLINIC | Age: 49
End: 2019-10-11

## 2019-10-11 DIAGNOSIS — Z02.83 ENCOUNTER FOR DRUG SCREENING: Primary | ICD-10-CM

## 2019-10-11 PROCEDURE — 80305 PR COLLECTION ONLY DRUG SCREEN: ICD-10-PCS | Mod: S$GLB,,, | Performed by: EMERGENCY MEDICINE

## 2019-10-11 PROCEDURE — 80305 DRUG TEST PRSMV DIR OPT OBS: CPT | Mod: S$GLB,,, | Performed by: EMERGENCY MEDICINE

## 2019-10-11 NOTE — PROGRESS NOTES
Subjective:       Patient ID: Glo Flores is a 49 y.o. female.    Chief Complaint: Affinity Blood Option 12    HPI  ROS    Objective:      Physical Exam    Assessment:       No diagnosis found.    Plan:                   No follow-ups on file.

## 2019-10-16 ENCOUNTER — OFFICE VISIT (OUTPATIENT)
Dept: PSYCHIATRY | Facility: CLINIC | Age: 49
End: 2019-10-16
Payer: COMMERCIAL

## 2019-10-16 DIAGNOSIS — F10.21 ALCOHOL USE DISORDER, SEVERE, IN SUSTAINED REMISSION, DEPENDENCE: Primary | ICD-10-CM

## 2019-10-16 PROCEDURE — 90853 PR GROUP PSYCHOTHERAPY: ICD-10-PCS | Mod: S$GLB,,, | Performed by: PSYCHOLOGIST

## 2019-10-16 PROCEDURE — 90853 GROUP PSYCHOTHERAPY: CPT | Mod: S$GLB,,, | Performed by: PSYCHOLOGIST

## 2019-10-16 NOTE — PROGRESS NOTES
Group Psychotherapy    Site: [facility]    Clinical status of patient: Outpatient    10/16/2019    Length of service:[id number]    Referred by: Addictive Behavior Unit     Number of patients in attendance: 7    Target symptoms: alcohol abuse    Patient's response to intervention:  The patient's response to intervention is active listening, self-disclosure.    Progress toward goals and other mental status changes:  The patient's progress toward goals is fair .    Interval history: Reports continued sobriety. Shared her story with a new group member.     Diagnosis: alcohol use disorder, severe, dependence, in sustained remission    Plan: group psychotherapy, medication management by physician, AA, abstinence, compliance with RNP contract and UCS    Return to clinic: 1 week

## 2019-10-23 ENCOUNTER — OFFICE VISIT (OUTPATIENT)
Dept: PSYCHIATRY | Facility: CLINIC | Age: 49
End: 2019-10-23
Payer: COMMERCIAL

## 2019-10-23 ENCOUNTER — OCCUPATIONAL HEALTH (OUTPATIENT)
Dept: URGENT CARE | Facility: CLINIC | Age: 49
End: 2019-10-23

## 2019-10-23 DIAGNOSIS — F10.21 ALCOHOL USE DISORDER, SEVERE, IN SUSTAINED REMISSION, DEPENDENCE: Primary | ICD-10-CM

## 2019-10-23 DIAGNOSIS — Z02.83 ENCOUNTER FOR DRUG SCREENING: Primary | ICD-10-CM

## 2019-10-23 PROCEDURE — 90853 PR GROUP PSYCHOTHERAPY: ICD-10-PCS | Mod: S$GLB,,, | Performed by: PSYCHOLOGIST

## 2019-10-23 PROCEDURE — 80305 DRUG TEST PRSMV DIR OPT OBS: CPT | Mod: S$GLB,,, | Performed by: EMERGENCY MEDICINE

## 2019-10-23 PROCEDURE — 80305 PR COLLECTION ONLY DRUG SCREEN: ICD-10-PCS | Mod: S$GLB,,, | Performed by: EMERGENCY MEDICINE

## 2019-10-23 PROCEDURE — 90853 GROUP PSYCHOTHERAPY: CPT | Mod: S$GLB,,, | Performed by: PSYCHOLOGIST

## 2019-10-23 NOTE — PROGRESS NOTES
Group Psychotherapy    Site: [facility]    Clinical status of patient: Outpatient    10/23/2019    Length of service:[id number]    Referred by: Addictive Behavior Unit     Number of patients in attendance: 7    Target symptoms: alcohol abuse    Patient's response to intervention:  The patient's response to intervention is active listening, self-disclosure.    Progress toward goals and other mental status changes:  The patient's progress toward goals is fair .    Interval history: Reports continued sobriety. Shared her story with new group member. Discussed things going well.    Diagnosis: alcohol use disorder, severe, dependence, in sustained remission    Plan: group psychotherapy, medication management by physician, AA, abstinence, compliance with RNP contract and UCS    Return to clinic: 1 week

## 2019-10-23 NOTE — PROGRESS NOTES
Subjective:       Patient ID: Glo Flores is a 49 y.o. female.    Chief Complaint: Affinity UDS Option 2    HPI  ROS    Objective:      Physical Exam    Assessment:       1. Encounter for drug screening        Plan:                   No follow-ups on file.

## 2019-10-28 ENCOUNTER — OFFICE VISIT (OUTPATIENT)
Dept: PSYCHIATRY | Facility: CLINIC | Age: 49
End: 2019-10-28
Payer: COMMERCIAL

## 2019-10-28 VITALS
BODY MASS INDEX: 33.17 KG/M2 | SYSTOLIC BLOOD PRESSURE: 126 MMHG | DIASTOLIC BLOOD PRESSURE: 73 MMHG | HEART RATE: 89 BPM | WEIGHT: 193.25 LBS

## 2019-10-28 DIAGNOSIS — F10.20 ALCOHOL USE DISORDER, SEVERE, DEPENDENCE: Primary | ICD-10-CM

## 2019-10-28 DIAGNOSIS — F33.42 RECURRENT MAJOR DEPRESSIVE DISORDER, IN FULL REMISSION: ICD-10-CM

## 2019-10-28 DIAGNOSIS — G47.00 INSOMNIA, UNSPECIFIED TYPE: ICD-10-CM

## 2019-10-28 DIAGNOSIS — F10.21 ALCOHOL USE DISORDER, SEVERE, IN EARLY REMISSION, DEPENDENCE: ICD-10-CM

## 2019-10-28 PROCEDURE — 99213 PR OFFICE/OUTPT VISIT, EST, LEVL III, 20-29 MIN: ICD-10-PCS | Mod: S$GLB,,, | Performed by: NURSE PRACTITIONER

## 2019-10-28 PROCEDURE — 99213 OFFICE O/P EST LOW 20 MIN: CPT | Mod: S$GLB,,, | Performed by: NURSE PRACTITIONER

## 2019-10-28 PROCEDURE — 99999 PR PBB SHADOW E&M-EST. PATIENT-LVL II: ICD-10-PCS | Mod: PBBFAC,,, | Performed by: NURSE PRACTITIONER

## 2019-10-28 PROCEDURE — 99999 PR PBB SHADOW E&M-EST. PATIENT-LVL II: CPT | Mod: PBBFAC,,, | Performed by: NURSE PRACTITIONER

## 2019-10-28 RX ORDER — FLUOXETINE HYDROCHLORIDE 20 MG/1
20 CAPSULE ORAL DAILY
Qty: 30 CAPSULE | Refills: 2 | Status: SHIPPED | OUTPATIENT
Start: 2019-10-28 | End: 2020-11-13

## 2019-10-28 RX ORDER — TRAZODONE HYDROCHLORIDE 50 MG/1
TABLET ORAL
Qty: 30 TABLET | Refills: 2 | Status: SHIPPED | OUTPATIENT
Start: 2019-10-28 | End: 2020-02-03

## 2019-10-28 NOTE — PROGRESS NOTES
"Outpatient Psychiatry Follow-Up Visit (MD/NP)    10/28/2019    Clinical Status of Patient:  Outpatient (Ambulatory)    Chief Complaint:  Glo Flores is a 49 y.o. female who presents today for follow-up of depression, anxiety and substance problems.  Met with patient.      Interval History and Content of Current Session:  Interim Events/Subjective Report/Content of Current Session:   She describes her mood as "wonderful" and her affect is bright and upbeat. 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 She states that she has been sober for 424 days and feels better about herself and her life due to sobriety. She continues to attend AA meetings, RNP, ABU aftercare groups and adheres to all elements of her treatment plan. She states that she is excited that she is getting more privileges to work. She displays insights about her behavior and the disease of addiction. She continues to utilize her effective coping skills in her daily life. She reports medication compliance and denies  any side effects to her current medication regimen. She reports improved depression, anxiety and sleep with her current medications of prozac 20mg daily and trazodone 50mg nightly.  Patient denies SI/HI/AH/VH and no delusion noted or reported. Patient denies ever having symptoms of wilma or hypomania. She reports no alcohol or drug use.      Psychotherapy:  · Target symptoms: alcohol abuse, depression, anxiety   · Why chosen therapy is appropriate versus another modality: relevant to diagnosis, patient responds to this modality, evidence based practice  · Outcome monitoring methods: self-report, observation  · Therapeutic intervention type: insight oriented psychotherapy, behavior modifying psychotherapy, supportive psychotherapy, interactive psychotherapy  · Topics discussed/themes: building skills sets for symptom management, symptom recognition  · The patient's response to the " intervention is motivated. The patient's progress toward treatment goals is good.   · Duration of intervention: 15 minutes.    Review of Systems   · PSYCHIATRIC: Pertinant items are noted in the narrative.  · CONSTITUTIONAL: No weight gain or loss.   · MUSCULOSKELETAL: No pain or stiffness of the joints.  · NEUROLOGIC: No weakness, sensory changes, seizures, confusion, memory loss, tremor or other abnormal movements.  · ENDOCRINE: No polydipsia or polyuria.  · INTEGUMENTARY: No rashes or lacerations.  · EYES: No exophthalmos, jaundice or blindness.  · ENT: No dizziness, tinnitus or hearing loss.  · RESPIRATORY: No shortness of breath.  · CARDIOVASCULAR: No tachycardia or chest pain.  · GASTROINTESTINAL: No nausea, vomiting, pain, constipation or diarrhea.  · GENITOURINARY: No frequency, dysuria or sexual dysfunction.  · HEMATOLOGIC/LYMPHATIC: No excessive bleeding, prolonged or excessive bleeding after dental extraction/injury.  · ALLERGIC/IMMUNOLOGIC: No allergic response to materials, foods or animals at this time.    Past Medical, Family and Social History: The patient's past medical, family and social history have been reviewed and updated as appropriate within the electronic medical record - see encounter notes.    Compliance: yes    Side effects: None    Risk Parameters:  Patient reports no suicidal ideation  Patient reports no homicidal ideation  Patient reports no self-injurious behavior  Patient reports no violent behavior    Exam (detailed: at least 9 elements; comprehensive: all 15 elements)   Constitutional  Vitals:  Most recent vital signs, dated greater than 90 days prior to this appointment, were reviewed.   Vitals:    10/28/19 1247   BP: 126/73   Pulse: 89   Weight: 87.6 kg (193 lb 3.7 oz)        General:  unremarkable, age appropriate     Musculoskeletal  Muscle Strength/Tone:  no dystonia, no tremor, no tic   Gait & Station:  non-ataxic     Psychiatric  Speech:  no latency; no press   Mood &  "Affect:  "wonderful"  congruent and appropriate and bright     Thought Process:  normal and logical   Associations:  intact   Thought Content:  normal, no suicidality, no homicidality, delusions, or paranoia   Insight:  intact   Judgement: behavior is adequate to circumstances   Orientation:  grossly intact   Memory: intact for content of interview   Language: grossly intact   Attention Span & Concentration:  able to focus   Fund of Knowledge:  intact and appropriate to age and level of education     Assessment and Diagnosis   Status/Progress: Based on the examination today, the patient's problem(s) is/are improved.  New problems have not been presented today.   Co-morbidities, Diagnostic uncertainty and Lack of compliance are not complicating management of the primary condition.  There are no active rule-out diagnoses for this patient at this time.     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 Remains sober and euthymic. 6/5/19 presented as upbeat and bright. She denied feeling depressed or anxious. She reported doing well at her job and enjoying her sobriety. She is 9 months sober, attends AA meetings, RNP, ABU aftercare, and compliant with her medications. No side effects reported or noted. Reported trazodone is effective in treating her insomnia. Will continue the same medication regimen. RTC in 3 months.         ICD-10-CM ICD-9-CM   2. Recurrent major depressive disorder, in full remission F33.42 296.36   3. Alcohol use disorder, severe, in early remission, dependence F10.21 303.93   4. Insomnia, unspecified type G47.00 780.52       Intervention/Counseling/Treatment Plan   · Medication Management: Continue current medications. The risks and benefits of medication were discussed with the patient.   -continue prozac 20 mg daily for depression  -continue trazodone 50 mg qhs " for insomnia  -continue monitoring per RNP, other therapy, ABU aftercare and regular AA  -return in 3 months for follow up  -Discussed informed consent, diagnosis, risks and benefits of proposed treatment above vs alternative treatments vs no treatment, and potential side effects of these treatments. The patient expresses understanding of the above and displays the capacity to agree with this treatment given said understanding. Patient also agrees that, currently, the benefits outweigh the risks and would like to pursue treatment at this time. Answered all questions and discussed follow up. Encouraged patient to contact us with any questions or concerns.   -Encouraged Patient to keep future appointments.  -Take medications as prescribed and abstain from substance abuse.  -Pt to present to ED for thoughts to harm herself or others              Return to Clinic: 3 months

## 2019-10-30 ENCOUNTER — OFFICE VISIT (OUTPATIENT)
Dept: PSYCHIATRY | Facility: CLINIC | Age: 49
End: 2019-10-30
Payer: COMMERCIAL

## 2019-10-30 DIAGNOSIS — F10.21 ALCOHOL USE DISORDER, SEVERE, IN SUSTAINED REMISSION, DEPENDENCE: Primary | ICD-10-CM

## 2019-10-30 PROCEDURE — 90853 GROUP PSYCHOTHERAPY: CPT | Mod: S$GLB,,, | Performed by: PSYCHOLOGIST

## 2019-10-30 PROCEDURE — 90853 PR GROUP PSYCHOTHERAPY: ICD-10-PCS | Mod: S$GLB,,, | Performed by: PSYCHOLOGIST

## 2019-10-31 NOTE — PROGRESS NOTES
Group Psychotherapy    Site: [facility]    Clinical status of patient: Outpatient    10/30/2019    Length of service:[id number]    Referred by: Addictive Behavior Unit     Number of patients in attendance: 5    Target symptoms: alcohol abuse    Patient's response to intervention:  The patient's response to intervention is active listening, self-disclosure.    Progress toward goals and other mental status changes:  The patient's progress toward goals is fair .    Interval history: Reports continued sobriety. Discussed how she resolved a difficult situation at work.    Diagnosis: alcohol use disorder, severe, dependence, in sustained remission    Plan: group psychotherapy, medication management by physician, AA, abstinence, compliance with RNP contract and UCS    Return to clinic: 1 week

## 2019-11-05 ENCOUNTER — CLINICAL SUPPORT (OUTPATIENT)
Dept: URGENT CARE | Facility: CLINIC | Age: 49
End: 2019-11-05

## 2019-11-05 DIAGNOSIS — Z02.83 ENCOUNTER FOR DRUG SCREENING: Primary | ICD-10-CM

## 2019-11-06 ENCOUNTER — OFFICE VISIT (OUTPATIENT)
Dept: PSYCHIATRY | Facility: CLINIC | Age: 49
End: 2019-11-06
Payer: COMMERCIAL

## 2019-11-06 DIAGNOSIS — F10.21 ALCOHOL USE DISORDER, SEVERE, IN SUSTAINED REMISSION, DEPENDENCE: Primary | ICD-10-CM

## 2019-11-06 PROCEDURE — 90853 GROUP PSYCHOTHERAPY: CPT | Mod: S$GLB,,, | Performed by: PSYCHOLOGIST

## 2019-11-06 PROCEDURE — 90853 PR GROUP PSYCHOTHERAPY: ICD-10-PCS | Mod: S$GLB,,, | Performed by: PSYCHOLOGIST

## 2019-11-06 NOTE — PROGRESS NOTES
Group Psychotherapy    Site: [facility]    Clinical status of patient: Outpatient    11/6/2019    Length of service:[id number]    Referred by: Addictive Behavior Unit     Number of patients in attendance: 5    Target symptoms: alcohol abuse    Patient's response to intervention:  The patient's response to intervention is active listening, self-disclosure.    Progress toward goals and other mental status changes:  The patient's progress toward goals is fair .    Interval history: Reports continued sobriety. Discussed being pro-active with RNP. Has gotten privileges back.    Diagnosis: alcohol use disorder, severe, dependence, in sustained remission    Plan: group psychotherapy, medication management by physician, AA, abstinence, compliance with RNP contract and UCS    Return to clinic: 1 week

## 2019-11-12 ENCOUNTER — CLINICAL SUPPORT (OUTPATIENT)
Dept: URGENT CARE | Facility: CLINIC | Age: 49
End: 2019-11-12

## 2019-11-12 DIAGNOSIS — Z02.83 ENCOUNTER FOR DRUG SCREENING: Primary | ICD-10-CM

## 2019-11-12 PROCEDURE — 80305 PR COLLECTION ONLY DRUG SCREEN: ICD-10-PCS | Mod: S$GLB,,, | Performed by: EMERGENCY MEDICINE

## 2019-11-12 PROCEDURE — 80305 DRUG TEST PRSMV DIR OPT OBS: CPT | Mod: S$GLB,,, | Performed by: EMERGENCY MEDICINE

## 2019-11-12 NOTE — PROGRESS NOTES
Subjective:       Patient ID: Glo Flores is a 49 y.o. female.    Chief Complaint: Affinity UDS Option 11    Affinity UDS Option 11    ROS    Objective:      Physical Exam    Assessment:       No diagnosis found.    Plan:                   No follow-ups on file.

## 2019-11-13 ENCOUNTER — OFFICE VISIT (OUTPATIENT)
Dept: PSYCHIATRY | Facility: CLINIC | Age: 49
End: 2019-11-13
Payer: COMMERCIAL

## 2019-11-13 DIAGNOSIS — F10.21 ALCOHOL USE DISORDER, SEVERE, IN SUSTAINED REMISSION, DEPENDENCE: Primary | ICD-10-CM

## 2019-11-13 PROCEDURE — 90853 PR GROUP PSYCHOTHERAPY: ICD-10-PCS | Mod: S$GLB,,, | Performed by: PSYCHOLOGIST

## 2019-11-13 PROCEDURE — 90853 GROUP PSYCHOTHERAPY: CPT | Mod: S$GLB,,, | Performed by: PSYCHOLOGIST

## 2019-11-13 NOTE — PROGRESS NOTES
Group Psychotherapy    Site: [facility]    Clinical status of patient: Outpatient    11/13/2019    Length of service:98948-22dlj    Referred by: Addictive Behavior Unit     Number of patients in attendance: 7    Target symptoms: alcohol abuse    Patient's response to intervention:  The patient's response to intervention is active listening, self-disclosure.    Progress toward goals and other mental status changes:  The patient's progress toward goals is fair .    Interval history:  Shared her story with new group member.    Diagnosis: alcohol use disorder, severe, dependence, in sustained remission    Plan: group psychotherapy, medication management by physician, AA, abstinence, compliance with RNP contract and UCS    Return to clinic: 1 week

## 2019-11-20 ENCOUNTER — OFFICE VISIT (OUTPATIENT)
Dept: PSYCHIATRY | Facility: CLINIC | Age: 49
End: 2019-11-20
Payer: COMMERCIAL

## 2019-11-20 DIAGNOSIS — F10.21 ALCOHOL USE DISORDER, SEVERE, IN SUSTAINED REMISSION, DEPENDENCE: Primary | ICD-10-CM

## 2019-11-20 PROCEDURE — 90853 GROUP PSYCHOTHERAPY: CPT | Mod: S$GLB,,, | Performed by: PSYCHOLOGIST

## 2019-11-20 PROCEDURE — 90853 PR GROUP PSYCHOTHERAPY: ICD-10-PCS | Mod: S$GLB,,, | Performed by: PSYCHOLOGIST

## 2019-11-20 NOTE — PROGRESS NOTES
Group Psychotherapy    Site: [facility]    Clinical status of patient: Outpatient    11/20/2019    Length of service:[id number]    Referred by: Addictive Behavior Unit     Number of patients in attendance: 8    Target symptoms: alcohol abuse    Patient's response to intervention:  The patient's response to intervention is active listening, self-disclosure.    Progress toward goals and other mental status changes:  The patient's progress toward goals is fair .    Interval history:  Shared her story with new group member.    Diagnosis: alcohol use disorder, severe, dependence, in sustained remission    Plan: group psychotherapy, medication management by physician, AA, abstinence, compliance with RNP contract and UCS    Return to clinic: 2 weeks

## 2019-11-25 ENCOUNTER — OCCUPATIONAL HEALTH (OUTPATIENT)
Dept: URGENT CARE | Facility: CLINIC | Age: 49
End: 2019-11-25

## 2019-11-25 DIAGNOSIS — Z02.83 ENCOUNTER FOR DRUG SCREENING: Primary | ICD-10-CM

## 2019-11-25 PROCEDURE — 80305 PR COLLECTION ONLY DRUG SCREEN: ICD-10-PCS | Mod: S$GLB,,, | Performed by: EMERGENCY MEDICINE

## 2019-11-25 PROCEDURE — 80305 DRUG TEST PRSMV DIR OPT OBS: CPT | Mod: S$GLB,,, | Performed by: EMERGENCY MEDICINE

## 2019-11-25 NOTE — PROGRESS NOTES
Subjective:       Patient ID: Glo Flores is a 49 y.o. female.    Chief Complaint: Affinity UDS Option 9*    Affinity UDS Option 9    ROS    Objective:      Physical Exam    Assessment:       1. Encounter for drug screening        Plan:                   No follow-ups on file.

## 2019-12-04 ENCOUNTER — OFFICE VISIT (OUTPATIENT)
Dept: PSYCHIATRY | Facility: CLINIC | Age: 49
End: 2019-12-04
Payer: COMMERCIAL

## 2019-12-04 DIAGNOSIS — F10.21 ALCOHOL USE DISORDER, SEVERE, IN SUSTAINED REMISSION, DEPENDENCE: Primary | ICD-10-CM

## 2019-12-04 PROCEDURE — 90853 PR GROUP PSYCHOTHERAPY: ICD-10-PCS | Mod: S$GLB,,, | Performed by: PSYCHOLOGIST

## 2019-12-04 PROCEDURE — 90853 GROUP PSYCHOTHERAPY: CPT | Mod: S$GLB,,, | Performed by: PSYCHOLOGIST

## 2019-12-04 NOTE — PROGRESS NOTES
Group Psychotherapy    Site: [facility]    Clinical status of patient: Outpatient    12/4/2019    Length of service:[id number]    Referred by: Addictive Behavior Unit     Number of patients in attendance: 9    Target symptoms: alcohol abuse    Patient's response to intervention:  The patient's response to intervention is active listening, self-disclosure.    Progress toward goals and other mental status changes:  The patient's progress toward goals is fair .    Interval history:  Shared her story with new group member.    Diagnosis: alcohol use disorder, severe, dependence, in sustained remission    Plan: group psychotherapy, medication management by physician, AA, abstinence, compliance with RNP contract and UCS    Return to clinic: 1 week

## 2019-12-09 ENCOUNTER — OCCUPATIONAL HEALTH (OUTPATIENT)
Dept: URGENT CARE | Facility: CLINIC | Age: 49
End: 2019-12-09

## 2019-12-09 DIAGNOSIS — Z02.83 ENCOUNTER FOR DRUG SCREENING: Primary | ICD-10-CM

## 2019-12-09 PROCEDURE — 80305 PR COLLECTION ONLY DRUG SCREEN: ICD-10-PCS | Mod: S$GLB,,, | Performed by: EMERGENCY MEDICINE

## 2019-12-09 PROCEDURE — 80305 DRUG TEST PRSMV DIR OPT OBS: CPT | Mod: S$GLB,,, | Performed by: EMERGENCY MEDICINE

## 2019-12-09 NOTE — PROGRESS NOTES
Subjective:       Patient ID: Glo Flores is a 49 y.o. female.    Chief Complaint: Affinity UDS    HPI  ROS     Objective:      Physical Exam    Assessment:       No diagnosis found.    Plan:                   No follow-ups on file.

## 2019-12-11 ENCOUNTER — OFFICE VISIT (OUTPATIENT)
Dept: PSYCHIATRY | Facility: CLINIC | Age: 49
End: 2019-12-11
Payer: COMMERCIAL

## 2019-12-11 DIAGNOSIS — F10.21 ALCOHOL USE DISORDER, SEVERE, IN SUSTAINED REMISSION, DEPENDENCE: Primary | ICD-10-CM

## 2019-12-11 PROCEDURE — 90853 PR GROUP PSYCHOTHERAPY: ICD-10-PCS | Mod: S$GLB,,, | Performed by: PSYCHOLOGIST

## 2019-12-11 PROCEDURE — 90853 GROUP PSYCHOTHERAPY: CPT | Mod: S$GLB,,, | Performed by: PSYCHOLOGIST

## 2019-12-11 NOTE — PROGRESS NOTES
Group Psychotherapy    Site: [facility]    Clinical status of patient: Outpatient    12/11/2019    Length of service:62468-68rtq    Referred by: Addictive Behavior Unit     Number of patients in attendance: 7    Target symptoms: alcohol abuse    Patient's response to intervention:  The patient's response to intervention is active listening, self-disclosure.    Progress toward goals and other mental status changes:  The patient's progress toward goals is fair .    Interval history:  Shared her story. Discussed resolving any anger toward RNP.    Diagnosis: alcohol use disorder, severe, dependence, in sustained remission    Plan: group psychotherapy, medication management by physician, AA, abstinence, compliance with RNP contract and UCS    Return to clinic: 1 week

## 2019-12-30 ENCOUNTER — OCCUPATIONAL HEALTH (OUTPATIENT)
Dept: URGENT CARE | Facility: CLINIC | Age: 49
End: 2019-12-30

## 2019-12-30 DIAGNOSIS — Z02.83 ENCOUNTER FOR DRUG SCREENING: Primary | ICD-10-CM

## 2019-12-30 PROCEDURE — 80305 PR COLLECTION ONLY DRUG SCREEN: ICD-10-PCS | Mod: S$GLB,,, | Performed by: EMERGENCY MEDICINE

## 2019-12-30 PROCEDURE — 80305 DRUG TEST PRSMV DIR OPT OBS: CPT | Mod: S$GLB,,, | Performed by: EMERGENCY MEDICINE

## 2020-01-08 ENCOUNTER — OFFICE VISIT (OUTPATIENT)
Dept: PSYCHIATRY | Facility: CLINIC | Age: 50
End: 2020-01-08
Payer: COMMERCIAL

## 2020-01-08 DIAGNOSIS — F10.21 ALCOHOL USE DISORDER, SEVERE, IN SUSTAINED REMISSION, DEPENDENCE: Primary | ICD-10-CM

## 2020-01-08 PROCEDURE — 90853 PR GROUP PSYCHOTHERAPY: ICD-10-PCS | Mod: S$GLB,,, | Performed by: PSYCHOLOGIST

## 2020-01-08 PROCEDURE — 90853 GROUP PSYCHOTHERAPY: CPT | Mod: S$GLB,,, | Performed by: PSYCHOLOGIST

## 2020-01-08 PROCEDURE — 90853 GROUP PSYCHOTHERAPY: CPT | Mod: PBBFAC | Performed by: PSYCHOLOGIST

## 2020-01-08 NOTE — PROGRESS NOTES
Group Psychotherapy - ABU Aftercare Group    Site: [facility]    Clinical status of patient: Outpatient    1/8/2020    Length of service:[id number]    Referred by: Addictive Behavior Unit     Number of patients in attendance: 9     Target symptoms: alcohol abuse    Patient's response to intervention:  The patient's response to intervention is active listening, self-disclosure, feedback to other patients.    Progress toward goals and other mental status changes:  The patient's progress toward goals is good.    Interval history: Pt reports that she started a new job over the weekend and is very excited about it. She discussed how her ability to be less concerned with helping others and more focused on her own actions/behaviors has benefited her greatly as well.     Diagnosis: Alcohol Use Disorder    Plan: group psychotherapy    Return to clinic: 1 week

## 2020-01-14 ENCOUNTER — CLINICAL SUPPORT (OUTPATIENT)
Dept: URGENT CARE | Facility: CLINIC | Age: 50
End: 2020-01-14

## 2020-01-14 DIAGNOSIS — Z02.83 ENCOUNTER FOR DRUG SCREENING: Primary | ICD-10-CM

## 2020-01-14 PROCEDURE — 80305 PR COLLECTION ONLY DRUG SCREEN: ICD-10-PCS | Mod: S$GLB,,, | Performed by: EMERGENCY MEDICINE

## 2020-01-14 PROCEDURE — 80305 DRUG TEST PRSMV DIR OPT OBS: CPT | Mod: S$GLB,,, | Performed by: EMERGENCY MEDICINE

## 2020-01-15 ENCOUNTER — OFFICE VISIT (OUTPATIENT)
Dept: PSYCHIATRY | Facility: CLINIC | Age: 50
End: 2020-01-15
Payer: COMMERCIAL

## 2020-01-15 DIAGNOSIS — F10.21 ALCOHOL USE DISORDER, SEVERE, IN SUSTAINED REMISSION, DEPENDENCE: Primary | ICD-10-CM

## 2020-01-15 PROCEDURE — 90853 GROUP PSYCHOTHERAPY: CPT | Mod: S$GLB,,, | Performed by: PSYCHOLOGIST

## 2020-01-15 PROCEDURE — 90853 GROUP PSYCHOTHERAPY: CPT | Mod: PBBFAC | Performed by: PSYCHOLOGIST

## 2020-01-15 PROCEDURE — 90853 PR GROUP PSYCHOTHERAPY: ICD-10-PCS | Mod: S$GLB,,, | Performed by: PSYCHOLOGIST

## 2020-01-15 NOTE — PROGRESS NOTES
"Group Psychotherapy - ABU Aftercare Group    Site: [facility]    Clinical status of patient: Outpatient    1/15/2020    Length of service:[id number]    Referred by: Addictive Behavior Unit     Number of patients in attendance: 7     Target symptoms: alcohol abuse    Patient's response to intervention:  The patient's response to intervention is active listening, self-disclosure, feedback to other patients.    Progress toward goals and other mental status changes:  The patient's progress toward goals is good.    Interval history: Pt reports that her new job continues to go well. She continues to work on "leaving things alone that are not my business". She gave supportive feedback to others today.    Diagnosis: Alcohol Use Disorder    Plan: group psychotherapy    Return to clinic: 1 week      "

## 2020-01-22 ENCOUNTER — CLINICAL SUPPORT (OUTPATIENT)
Dept: URGENT CARE | Facility: CLINIC | Age: 50
End: 2020-01-22

## 2020-01-22 ENCOUNTER — OFFICE VISIT (OUTPATIENT)
Dept: PSYCHIATRY | Facility: CLINIC | Age: 50
End: 2020-01-22
Payer: COMMERCIAL

## 2020-01-22 DIAGNOSIS — F10.21 ALCOHOL USE DISORDER, SEVERE, IN SUSTAINED REMISSION, DEPENDENCE: Primary | ICD-10-CM

## 2020-01-22 DIAGNOSIS — Z02.83 ENCOUNTER FOR DRUG SCREENING: Primary | ICD-10-CM

## 2020-01-22 PROCEDURE — 80305 PR COLLECTION ONLY DRUG SCREEN: ICD-10-PCS | Mod: S$GLB,,, | Performed by: FAMILY MEDICINE

## 2020-01-22 PROCEDURE — 90853 GROUP PSYCHOTHERAPY: CPT | Mod: PBBFAC | Performed by: PSYCHOLOGIST

## 2020-01-22 PROCEDURE — 90853 GROUP PSYCHOTHERAPY: CPT | Mod: S$GLB,,, | Performed by: PSYCHOLOGIST

## 2020-01-22 PROCEDURE — 90853 PR GROUP PSYCHOTHERAPY: ICD-10-PCS | Mod: S$GLB,,, | Performed by: PSYCHOLOGIST

## 2020-01-22 PROCEDURE — 80305 DRUG TEST PRSMV DIR OPT OBS: CPT | Mod: S$GLB,,, | Performed by: FAMILY MEDICINE

## 2020-01-22 NOTE — PROGRESS NOTES
Group Psychotherapy - ABU Aftercare Group    Site: [facility]    Clinical status of patient: Outpatient    1/22/2020    Length of service:[id number]    Referred by: Addictive Behavior Unit     Number of patients in attendance: 5     Target symptoms: alcohol abuse    Patient's response to intervention:  The patient's response to intervention is active listening, self-disclosure, feedback to other patients.    Progress toward goals and other mental status changes:  The patient's progress toward goals is good.    Interval history: Pt reports that she is doing well. She continues to feel grateful for her new job. She and her boyfriend purchased a hot tub and installed it, and she is looking forward to using it for relaxation and meditation.    Diagnosis: Alcohol Use Disorder    Plan: group psychotherapy    Return to clinic: 1 week

## 2020-01-29 ENCOUNTER — TELEPHONE (OUTPATIENT)
Dept: PSYCHIATRY | Facility: CLINIC | Age: 50
End: 2020-01-29

## 2020-02-01 DIAGNOSIS — F33.42 RECURRENT MAJOR DEPRESSIVE DISORDER, IN FULL REMISSION: ICD-10-CM

## 2020-02-03 RX ORDER — TRAZODONE HYDROCHLORIDE 50 MG/1
TABLET ORAL
Qty: 30 TABLET | Refills: 2 | Status: SHIPPED | OUTPATIENT
Start: 2020-02-03

## 2020-02-04 ENCOUNTER — OFFICE VISIT (OUTPATIENT)
Dept: PSYCHIATRY | Facility: CLINIC | Age: 50
End: 2020-02-04
Payer: COMMERCIAL

## 2020-02-04 VITALS
SYSTOLIC BLOOD PRESSURE: 109 MMHG | HEART RATE: 99 BPM | BODY MASS INDEX: 32.56 KG/M2 | HEIGHT: 64 IN | DIASTOLIC BLOOD PRESSURE: 66 MMHG | WEIGHT: 190.69 LBS

## 2020-02-04 DIAGNOSIS — F33.42 RECURRENT MAJOR DEPRESSIVE DISORDER, IN FULL REMISSION: ICD-10-CM

## 2020-02-04 DIAGNOSIS — G47.00 INSOMNIA, UNSPECIFIED TYPE: ICD-10-CM

## 2020-02-04 DIAGNOSIS — F10.21 ALCOHOL USE DISORDER, SEVERE, IN SUSTAINED REMISSION, DEPENDENCE: Primary | ICD-10-CM

## 2020-02-04 PROCEDURE — 99213 OFFICE O/P EST LOW 20 MIN: CPT | Mod: S$GLB,,, | Performed by: NURSE PRACTITIONER

## 2020-02-04 PROCEDURE — 99213 PR OFFICE/OUTPT VISIT, EST, LEVL III, 20-29 MIN: ICD-10-PCS | Mod: S$GLB,,, | Performed by: NURSE PRACTITIONER

## 2020-02-04 PROCEDURE — 99999 PR PBB SHADOW E&M-EST. PATIENT-LVL II: CPT | Mod: PBBFAC,,, | Performed by: NURSE PRACTITIONER

## 2020-02-04 PROCEDURE — 99999 PR PBB SHADOW E&M-EST. PATIENT-LVL II: ICD-10-PCS | Mod: PBBFAC,,, | Performed by: NURSE PRACTITIONER

## 2020-02-04 NOTE — PROGRESS NOTES
"Outpatient Psychiatry Follow-Up Visit (MD/NP)    2/4/2020    Clinical Status of Patient:  Outpatient (Ambulatory)    Chief Complaint:  Glo Flores is a 49 y.o. female who presents today for follow-up of depression, anxiety and substance problems.  Met with patient.      Interval History and Content of Current Session:  Interim Events/Subjective Report/Content of Current Session:   She describes her mood as "wonderful again today. I am in a new position at my job and I love it" and her affect is bright. 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 She states that she has been sober for 523 days and feels better about her life due to sobriety. She continues to attend AA meetings, RNP, ABU aftercare groups and adheres to all elements of her treatment plan. She displays good insight, judgement, dedication to her recovery. She continues to utilize her effective coping skills in her daily life. Continues to utilize of effective CBT skills, positive self-talk, cognitive reframing, mindfulness, deep breathing, and relaxations skills.  She reports medication compliance and denies any side effects to her current medication regimen. She reports improved depression, anxiety and sleep with her current medications of prozac 20mg daily and trazodone 50mg nightly.  Patient denies SI/HI/AH/VH and no delusion noted or reported. Patient denies ever having symptoms of wilma or hypomania. She reports no alcohol or drug use.      Psychotherapy:  · Target symptoms: alcohol abuse, depression, anxiety   · Why chosen therapy is appropriate versus another modality: relevant to diagnosis, patient responds to this modality, evidence based practice  · Outcome monitoring methods: self-report, observation  · Therapeutic intervention type: insight oriented psychotherapy, behavior modifying psychotherapy, supportive psychotherapy, interactive psychotherapy  · Topics discussed/themes: building skills sets " "for symptom management, symptom recognition  · The patient's response to the intervention is motivated. The patient's progress toward treatment goals is good.   · Duration of intervention: 14 minutes.    Review of Systems   · PSYCHIATRIC: Pertinant items are noted in the narrative.  · CONSTITUTIONAL: No weight gain or loss.   · MUSCULOSKELETAL: No pain or stiffness of the joints.  · NEUROLOGIC: No weakness, sensory changes, seizures, confusion, memory loss, tremor or other abnormal movements.  · ENDOCRINE: No polydipsia or polyuria.  · INTEGUMENTARY: No rashes or lacerations.  · EYES: No exophthalmos, jaundice or blindness.  · ENT: No dizziness, tinnitus or hearing loss.  · RESPIRATORY: No shortness of breath.  · CARDIOVASCULAR: No tachycardia or chest pain.  · GASTROINTESTINAL: No nausea, vomiting, pain, constipation or diarrhea.  · GENITOURINARY: No frequency, dysuria or sexual dysfunction.  · HEMATOLOGIC/LYMPHATIC: No excessive bleeding, prolonged or excessive bleeding after dental extraction/injury.  · ALLERGIC/IMMUNOLOGIC: No allergic response to materials, foods or animals at this time.    Past Medical, Family and Social History: The patient's past medical, family and social history have been reviewed and updated as appropriate within the electronic medical record - see encounter notes.    Compliance: yes    Side effects: None    Risk Parameters:  Patient reports no suicidal ideation  Patient reports no homicidal ideation  Patient reports no self-injurious behavior  Patient reports no violent behavior    Exam (detailed: at least 9 elements; comprehensive: all 15 elements)   Constitutional  Vitals:  Most recent vital signs, dated greater than 90 days prior to this appointment, were reviewed.   Vitals:    02/04/20 0847   BP: 109/66   Pulse: 99   Weight: 86.5 kg (190 lb 11.2 oz)   Height: 5' 4" (1.626 m)        General:  unremarkable, age appropriate     Musculoskeletal  Muscle Strength/Tone:  no dystonia, no " "tremor, no tic   Gait & Station:  non-ataxic     Psychiatric  Speech:  no latency; no press   Mood & Affect:  "wonderful again"  congruent and appropriate and bright     Thought Process:  normal and logical   Associations:  intact   Thought Content:  normal, no suicidality, no homicidality, delusions, or paranoia   Insight:  intact   Judgement: behavior is adequate to circumstances   Orientation:  grossly intact   Memory: intact for content of interview   Language: grossly intact   Attention Span & Concentration:  able to focus   Fund of Knowledge:  intact and appropriate to age and level of education     Assessment and Diagnosis   Status/Progress: Based on the examination today, the patient's problem(s) is/are improved.  New problems have not been presented today.   Co-morbidities, Diagnostic uncertainty and Lack of compliance are not complicating management of the primary condition.  There are no active rule-out diagnoses for this patient at this time.     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 Remains sober and euthymic. 6/5/19 presented as upbeat and bright. She denied feeling depressed or anxious. She reported doing well at her job and enjoying her sobriety. She is 9 months sober, attends AA meetings, RNP, ABU aftercare, and compliant with her medications. No side effects reported or noted. Reported trazodone is effective in treating her insomnia. Will continue the same medication regimen. RTC in 3 months. 2/4/2020 stable and adheres to all elements of treatment.          ICD-10-CM ICD-9-CM   2. Recurrent major depressive disorder, in full remission F33.42 296.36   3. Alcohol use disorder, severe, in early remission, dependence F10.21 303.93   4. Insomnia, unspecified type G47.00 780.52       Intervention/Counseling/Treatment Plan   · Medication Management: Continue " current medications. The risks and benefits of medication were discussed with the patient.   -continue prozac 20 mg daily for depression and anxiety  -continue trazodone 50 mg qhs for insomnia  -continue monitoring per RNP, other therapy, ABU aftercare and regular AA  -return in 3 months for follow up  -Discussed informed consent, diagnosis, risks and benefits of proposed treatment above vs alternative treatments vs no treatment, and potential side effects of these treatments. The patient expresses understanding of the above and displays the capacity to agree with this treatment given said understanding. Patient also agrees that, currently, the benefits outweigh the risks and would like to pursue treatment at this time. Answered all questions and discussed follow up. Encouraged patient to contact us with any questions or concerns.   -Encouraged Patient to keep future appointments.  -Take medications as prescribed and abstain from substance abuse.  -Pt to present to ED for thoughts to harm herself or others         Return to Clinic: 3 months     MIGUEL Waldron-BC

## 2020-02-05 ENCOUNTER — OFFICE VISIT (OUTPATIENT)
Dept: PSYCHIATRY | Facility: CLINIC | Age: 50
End: 2020-02-05
Payer: COMMERCIAL

## 2020-02-05 DIAGNOSIS — F10.21 ALCOHOL USE DISORDER, SEVERE, IN SUSTAINED REMISSION, DEPENDENCE: Primary | ICD-10-CM

## 2020-02-05 PROCEDURE — 90853 GROUP PSYCHOTHERAPY: CPT | Mod: S$GLB,,, | Performed by: PSYCHOLOGIST

## 2020-02-05 PROCEDURE — 90853 PR GROUP PSYCHOTHERAPY: ICD-10-PCS | Mod: S$GLB,,, | Performed by: PSYCHOLOGIST

## 2020-02-05 NOTE — PROGRESS NOTES
Group Psychotherapy - ABU Aftercare Group    Site: [facility] [address]    Clinical status of patient: Outpatient    2/5/2020    Length of service:[id number]    Referred by: Addictive Behavior Unit     Number of patients in attendance: 7     Target symptoms: alcohol abuse    Patient's response to intervention:  The patient's response to intervention is active listening, self-disclosure, feedback to other patients.    Progress toward goals and other mental status changes:  The patient's progress toward goals is good.    Interval history: Pt reports that she is doing well. She had her first true night shift last week and states it went well. She was supportive as others as typical. Next week will be her last in group.    Diagnosis: Alcohol Use Disorder    Plan: group psychotherapy    Return to clinic: 1 week

## 2020-02-07 ENCOUNTER — OCCUPATIONAL HEALTH (OUTPATIENT)
Dept: URGENT CARE | Facility: CLINIC | Age: 50
End: 2020-02-07

## 2020-02-07 DIAGNOSIS — Z02.83 ENCOUNTER FOR DRUG SCREENING: Primary | ICD-10-CM

## 2020-02-07 PROCEDURE — 80305 DRUG TEST PRSMV DIR OPT OBS: CPT | Mod: S$GLB,,, | Performed by: EMERGENCY MEDICINE

## 2020-02-07 PROCEDURE — 80305 OOH COLLECTION ONLY DRUG SCREEN: ICD-10-PCS | Mod: S$GLB,,, | Performed by: EMERGENCY MEDICINE

## 2020-02-12 ENCOUNTER — PATIENT MESSAGE (OUTPATIENT)
Dept: PSYCHIATRY | Facility: CLINIC | Age: 50
End: 2020-02-12

## 2020-02-12 ENCOUNTER — OFFICE VISIT (OUTPATIENT)
Dept: PSYCHIATRY | Facility: CLINIC | Age: 50
End: 2020-02-12
Payer: COMMERCIAL

## 2020-02-12 DIAGNOSIS — F10.21 ALCOHOL USE DISORDER, SEVERE, IN SUSTAINED REMISSION, DEPENDENCE: Primary | ICD-10-CM

## 2020-02-12 PROCEDURE — 90853 PR GROUP PSYCHOTHERAPY: ICD-10-PCS | Mod: S$GLB,,, | Performed by: PSYCHOLOGIST

## 2020-02-12 PROCEDURE — 90853 GROUP PSYCHOTHERAPY: CPT | Mod: S$GLB,,, | Performed by: PSYCHOLOGIST

## 2020-02-12 NOTE — PROGRESS NOTES
Group Psychotherapy - ABU Aftercare Group    Site: [facility]    Clinical status of patient: Outpatient    2/12/2020    Length of service:[id number]    Referred by: Addictive Behavior Unit     Number of patients in attendance: 5     Target symptoms: alcohol abuse    Patient's response to intervention:  The patient's response to intervention is active listening, self-disclosure, feedback to other patients.    Progress toward goals and other mental status changes:  The patient's progress toward goals is good.    Interval history: Pt reports that she is doing well. This is her last regular group as she has completed her year requirement. She got nice feedback from others and described her own journey of recovery. She was encouraged to come back to group whenever she would like to even though she no longer has to.    Diagnosis: Alcohol Use Disorder, in sustained remission.    Plan: group psychotherapy    Return to clinic: No return date set as she has completed her required year of aftercare.

## 2020-02-14 ENCOUNTER — OCCUPATIONAL HEALTH (OUTPATIENT)
Dept: URGENT CARE | Facility: CLINIC | Age: 50
End: 2020-02-14

## 2020-02-14 DIAGNOSIS — Z02.83 ENCOUNTER FOR DRUG SCREENING: Primary | ICD-10-CM

## 2020-02-14 PROCEDURE — 80305 DRUG TEST PRSMV DIR OPT OBS: CPT | Mod: S$GLB,,, | Performed by: EMERGENCY MEDICINE

## 2020-02-14 PROCEDURE — 80305 PR COLLECTION ONLY DRUG SCREEN: ICD-10-PCS | Mod: S$GLB,,, | Performed by: EMERGENCY MEDICINE

## 2020-02-14 NOTE — PROGRESS NOTES
Subjective:       Patient ID: Glo Flores is a 49 y.o. female.    Chief Complaint: [facility]    Patient present for [facility] urine drug screen option 4. Con # [id number]    ROS     Objective:      Physical Exam    Assessment:       No diagnosis found.    Plan:                   No follow-ups on file.

## 2020-03-09 ENCOUNTER — OCCUPATIONAL HEALTH (OUTPATIENT)
Dept: URGENT CARE | Facility: CLINIC | Age: 50
End: 2020-03-09

## 2020-03-09 DIAGNOSIS — Z02.83 ENCOUNTER FOR DRUG SCREENING: Primary | ICD-10-CM

## 2020-03-09 PROCEDURE — 80305 OOH COLLECTION ONLY DRUG SCREEN: ICD-10-PCS | Mod: S$GLB,,, | Performed by: EMERGENCY MEDICINE

## 2020-03-09 PROCEDURE — 80305 DRUG TEST PRSMV DIR OPT OBS: CPT | Mod: S$GLB,,, | Performed by: EMERGENCY MEDICINE

## 2020-05-18 ENCOUNTER — PATIENT MESSAGE (OUTPATIENT)
Dept: PSYCHIATRY | Facility: CLINIC | Age: 50
End: 2020-05-18

## 2020-05-18 ENCOUNTER — OFFICE VISIT (OUTPATIENT)
Dept: PSYCHIATRY | Facility: CLINIC | Age: 50
End: 2020-05-18
Payer: COMMERCIAL

## 2020-05-18 DIAGNOSIS — F10.21 ALCOHOL USE DISORDER, SEVERE, IN SUSTAINED REMISSION: ICD-10-CM

## 2020-05-18 DIAGNOSIS — F33.42 RECURRENT MAJOR DEPRESSIVE DISORDER, IN FULL REMISSION: Primary | ICD-10-CM

## 2020-05-18 PROCEDURE — 99213 OFFICE O/P EST LOW 20 MIN: CPT | Mod: 95,,, | Performed by: NURSE PRACTITIONER

## 2020-05-18 PROCEDURE — 99213 PR OFFICE/OUTPT VISIT, EST, LEVL III, 20-29 MIN: ICD-10-PCS | Mod: 95,,, | Performed by: NURSE PRACTITIONER

## 2020-05-18 NOTE — PROGRESS NOTES
"Outpatient Psychiatry Follow-Up Visit (MD/NP)    5/18/2020   The patient location is: [facility] in [place], [place]  The chief complaint leading to consultation is: Alcohol dependence, depression and anxiety    Visit type: audiovisual   20 minutes of total time spent on the encounter, which includes face to face time and non-face to face time preparing to see the patient (eg, review of tests), Obtaining and/or reviewing separately obtained history, Documenting clinical information in the electronic or other health record, Independently interpreting results (not separately reported) and communicating results to the patient/family/caregiver, or Care coordination (not separately reported).         Each patient to whom he or she provides medical services by telemedicine is:  (1) informed of the relationship between the physician and patient and the respective role of any other health care provider with respect to management of the patient; and (2) notified that he or she may decline to receive medical services by telemedicine and may withdraw from such care at any time        Clinical Status of Patient:  Outpatient (Ambulatory)    Chief Complaint:  Glo Flores is a 49 y.o. female who presents today for follow-up of depression, anxiety and substance problems.  Met with patient.      Interval History and Content of Current Session:  Interim Events/Subjective Report/Content of Current Session:   She describes her mood as "great"and her affect is euthymic. 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 She reported enjoying her job. She states that she has been sober for 627 days and feels better about her life due to sobriety. She continues to attend AA meetings and RNP, ABU aftercare groups and adheres to all elements of her treatment plan. She continues to display good insights, judgement, dedication her recovery. She continues to utilize her effective coping skills in her daily life. " Encouraged patient to continue utilization of effective CBT skills, positive self-talk, cognitive reframing, mindfulness, deep breathing, and relaxations skills.  She reports medication compliance and denies any side effects to her current medication regimen. She reports improved depression, anxiety and sleep with her current medications of prozac 20mg daily and trazodone 50mg nightly. She stated that she will be having her OB GYN prescribing these medications for her in the future and will make appointments with our psych department as needed. Patient has successfully completed the ABU treatment program. Patient denies SI/HI/AH/VH and no delusion noted or reported. Patient denies ever having symptoms of wilma or hypomania. She reports no alcohol or drug use.      Psychotherapy:  · Target symptoms: alcohol abuse, depression, anxiety   · Why chosen therapy is appropriate versus another modality: relevant to diagnosis, patient responds to this modality, evidence based practice  · Outcome monitoring methods: self-report, observation  · Therapeutic intervention type: insight oriented psychotherapy, behavior modifying psychotherapy, supportive psychotherapy, interactive psychotherapy  · Topics discussed/themes: building skills sets for symptom management, symptom recognition  · The patient's response to the intervention is motivated. The patient's progress toward treatment goals is good.   · Duration of intervention: 5 minutes.    Review of Systems   · PSYCHIATRIC: Pertinant items are noted in the narrative.  · CONSTITUTIONAL: No weight gain or loss.   · MUSCULOSKELETAL: No pain or stiffness of the joints.  · NEUROLOGIC: No weakness, sensory changes, seizures, confusion, memory loss, tremor or other abnormal movements.  · ENDOCRINE: No polydipsia or polyuria.  · INTEGUMENTARY: No rashes or lacerations.  · EYES: No exophthalmos, jaundice or blindness.  · ENT: No dizziness, tinnitus or hearing loss.  · RESPIRATORY: No  "shortness of breath.  · CARDIOVASCULAR: No tachycardia or chest pain.  · GASTROINTESTINAL: No nausea, vomiting, pain, constipation or diarrhea.  · GENITOURINARY: No frequency, dysuria or sexual dysfunction.  · HEMATOLOGIC/LYMPHATIC: No excessive bleeding, prolonged or excessive bleeding after dental extraction/injury.  · ALLERGIC/IMMUNOLOGIC: No allergic response to materials, foods or animals at this time.    Past Medical, Family and Social History: The patient's past medical, family and social history have been reviewed and updated as appropriate within the electronic medical record - see encounter notes.    Compliance: yes    Side effects: None    Risk Parameters:  Patient reports no suicidal ideation  Patient reports no homicidal ideation  Patient reports no self-injurious behavior  Patient reports no violent behavior    Exam (detailed: at least 9 elements; comprehensive: all 15 elements)   Constitutional  Vitals:  Most recent vital signs, dated greater than 90 days prior to this appointment, were reviewed.   There were no vitals filed for this visit.     General:  unremarkable, age appropriate     Musculoskeletal  Muscle Strength/Tone:  no dystonia, no tremor, no tic   Gait & Station:  non-ataxic     Psychiatric  Speech:  no latency; no press   Mood & Affect:  "great"  congruent and appropriate      Thought Process:  normal and logical   Associations:  intact   Thought Content:  normal, no suicidality, no homicidality, delusions, or paranoia   Insight:  intact   Judgement: behavior is adequate to circumstances   Orientation:  grossly intact   Memory: intact for content of interview   Language: grossly intact   Attention Span & Concentration:  able to focus   Fund of Knowledge:  intact and appropriate to age and level of education     Assessment and Diagnosis   Status/Progress: Based on the examination today, the patient's problem(s) is/are improved.  New problems have not been presented today.   Co-morbidities, " Diagnostic uncertainty and Lack of compliance are not complicating management of the primary condition.  There are no active rule-out diagnoses for this patient at this time.     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 Remains sober and euthymic. 6/5/19 presented as upbeat and bright. She denied feeling depressed or anxious. She reported doing well at her job and enjoying her sobriety. She is 9 months sober, attends AA meetings, RNP, ABU aftercare, and compliant with her medications. No side effects reported or noted. Reported trazodone is effective in treating her insomnia. Will continue the same medication regimen. RTC in 3 months. 2/4/2020 stable and adheres to all elements of treatment. 5/18/2020 stable and sober and no changes in treatment regimens. She adheres to all elements of treatment.          ICD-10-CM ICD-9-CM   2. Recurrent major depressive disorder, in full remission F33.42 296.36   3. Alcohol use disorder, severe, in function remission, dependence F10.21 303.93   4. Insomnia, unspecified type G47.00 780.52       Intervention/Counseling/Treatment Plan   · Medication Management: Continue current medications. The risks and benefits of medication were discussed with the patient.   -continue prozac 20 mg daily for depression and anxiety  -continue trazodone 50 mg qhs for insomnia  -continue monitoring per RNP, other therapy, ABU aftercare and regular AA  -return in 3 months for follow up  -Discussed informed consent, diagnosis, risks and benefits of proposed treatment above vs alternative treatments vs no treatment, and potential side effects of these treatments. The patient expresses understanding of the above and displays the capacity to agree with this treatment given said understanding. Patient also agrees that, currently, the benefits outweigh the risks and  would like to pursue treatment at this time. Answered all questions and discussed follow up. Encouraged patient to contact us with any questions or concerns.   -Encouraged Patient to keep future appointments.  -Take medications as prescribed and abstain from substance abuse.  -Pt to present to ED for thoughts to harm herself or others         Return to Clinic: 6 months, as needed     MIGUEL Waldron-BC

## 2020-05-21 ENCOUNTER — CLINICAL SUPPORT (OUTPATIENT)
Dept: URGENT CARE | Facility: CLINIC | Age: 50
End: 2020-05-21

## 2020-05-21 DIAGNOSIS — Z02.83 ENCOUNTER FOR DRUG SCREENING: Primary | ICD-10-CM

## 2020-05-21 PROBLEM — F10.21 ALCOHOL USE DISORDER, SEVERE, IN EARLY REMISSION, DEPENDENCE: Status: RESOLVED | Noted: 2019-06-05 | Resolved: 2020-05-21

## 2020-05-21 PROBLEM — F10.21 ALCOHOL USE DISORDER, SEVERE, IN SUSTAINED REMISSION: Status: ACTIVE | Noted: 2019-01-25

## 2020-05-21 PROCEDURE — 80305 DRUG TEST PRSMV DIR OPT OBS: CPT | Mod: S$GLB,,, | Performed by: PHYSICIAN ASSISTANT

## 2020-05-21 PROCEDURE — 80305 PR COLLECTION ONLY DRUG SCREEN: ICD-10-PCS | Mod: S$GLB,,, | Performed by: PHYSICIAN ASSISTANT

## 2020-06-15 ENCOUNTER — CLINICAL SUPPORT (OUTPATIENT)
Dept: URGENT CARE | Facility: CLINIC | Age: 50
End: 2020-06-15

## 2020-06-15 DIAGNOSIS — Z02.83 ENCOUNTER FOR DRUG SCREENING: Primary | ICD-10-CM

## 2020-06-15 PROCEDURE — 80305 PR COLLECTION ONLY DRUG SCREEN: ICD-10-PCS | Mod: S$GLB,,, | Performed by: EMERGENCY MEDICINE

## 2020-06-15 PROCEDURE — 80305 DRUG TEST PRSMV DIR OPT OBS: CPT | Mod: S$GLB,,, | Performed by: EMERGENCY MEDICINE

## 2020-06-23 ENCOUNTER — OCCUPATIONAL HEALTH (OUTPATIENT)
Dept: URGENT CARE | Facility: CLINIC | Age: 50
End: 2020-06-23

## 2020-06-23 DIAGNOSIS — Z02.83 ENCOUNTER FOR DRUG SCREENING: Primary | ICD-10-CM

## 2020-06-23 PROCEDURE — 80305 PR COLLECTION ONLY DRUG SCREEN: ICD-10-PCS | Mod: S$GLB,,, | Performed by: PREVENTIVE MEDICINE

## 2020-06-23 PROCEDURE — 80305 DRUG TEST PRSMV DIR OPT OBS: CPT | Mod: S$GLB,,, | Performed by: PREVENTIVE MEDICINE

## 2020-06-23 NOTE — PROGRESS NOTES
Subjective:       Patient ID: Glo Flores is a 49 y.o. female.    Chief Complaint: [facility] Blood Option 12    [facility] blood option 12  First attempt: left ac. No blood return.  Second attempt: rt hand. [id number]    ROS     Objective:      Physical Exam    Assessment:       No diagnosis found.    Plan:                   No follow-ups on file.

## 2020-06-25 ENCOUNTER — CLINICAL SUPPORT (OUTPATIENT)
Dept: URGENT CARE | Facility: CLINIC | Age: 50
End: 2020-06-25

## 2020-06-25 DIAGNOSIS — Z02.83 ENCOUNTER FOR DRUG SCREENING: Primary | ICD-10-CM

## 2020-06-25 PROCEDURE — 80305 DRUG TEST PRSMV DIR OPT OBS: CPT | Mod: S$GLB,,, | Performed by: EMERGENCY MEDICINE

## 2020-06-25 PROCEDURE — 80305 PR COLLECTION ONLY DRUG SCREEN: ICD-10-PCS | Mod: S$GLB,,, | Performed by: EMERGENCY MEDICINE

## 2020-07-29 ENCOUNTER — OCCUPATIONAL HEALTH (OUTPATIENT)
Dept: URGENT CARE | Facility: CLINIC | Age: 50
End: 2020-07-29

## 2020-07-29 VITALS — TEMPERATURE: 99 F

## 2020-07-29 DIAGNOSIS — Z02.83 ENCOUNTER FOR DRUG SCREENING: Primary | ICD-10-CM

## 2020-07-29 PROCEDURE — 80305 DRUG TEST PRSMV DIR OPT OBS: CPT | Mod: S$GLB,,, | Performed by: PREVENTIVE MEDICINE

## 2020-07-29 PROCEDURE — 80305 PR COLLECTION ONLY DRUG SCREEN: ICD-10-PCS | Mod: S$GLB,,, | Performed by: PREVENTIVE MEDICINE

## 2020-07-29 NOTE — PROGRESS NOTES
Subjective:       Patient ID: Glo Flores is a 50 y.o. female.    Chief Complaint: [facility] UDS Option 7    [facility] uds option 7  [employer] conf # [id number]    ROS     Objective:      Physical Exam    Assessment:       No diagnosis found.    Plan:                   No follow-ups on file.

## 2020-08-14 ENCOUNTER — OCCUPATIONAL HEALTH (OUTPATIENT)
Dept: URGENT CARE | Facility: CLINIC | Age: 50
End: 2020-08-14

## 2020-08-14 DIAGNOSIS — Z02.83 ENCOUNTER FOR DRUG SCREENING: Primary | ICD-10-CM

## 2020-08-14 PROCEDURE — 80305 DRUG TEST PRSMV DIR OPT OBS: CPT | Mod: S$GLB,,, | Performed by: FAMILY MEDICINE

## 2020-08-14 PROCEDURE — 80305 OOH COLLECTION ONLY DRUG SCREEN: ICD-10-PCS | Mod: S$GLB,,, | Performed by: FAMILY MEDICINE

## 2020-08-14 NOTE — PROGRESS NOTES
Subjective:       Patient ID: Glo Flores is a 50 y.o. female.    Chief Complaint: [facility] Blood Option 12    HPI  ROS     Objective:      Physical Exam    Assessment:       1. Encounter for drug screening        Plan:                   No follow-ups on file.

## 2020-09-03 ENCOUNTER — CLINICAL SUPPORT (OUTPATIENT)
Dept: URGENT CARE | Facility: CLINIC | Age: 50
End: 2020-09-03

## 2020-09-03 VITALS — TEMPERATURE: 98 F | HEART RATE: 96 BPM | OXYGEN SATURATION: 98 %

## 2020-09-03 DIAGNOSIS — Z02.83 ENCOUNTER FOR DRUG SCREENING: Primary | ICD-10-CM

## 2020-09-03 PROCEDURE — 80305 PR COLLECTION ONLY DRUG SCREEN: ICD-10-PCS | Mod: S$GLB,,, | Performed by: EMERGENCY MEDICINE

## 2020-09-03 PROCEDURE — 80305 DRUG TEST PRSMV DIR OPT OBS: CPT | Mod: S$GLB,,, | Performed by: EMERGENCY MEDICINE

## 2020-09-30 ENCOUNTER — CLINICAL SUPPORT (OUTPATIENT)
Dept: URGENT CARE | Facility: CLINIC | Age: 50
End: 2020-09-30

## 2020-09-30 DIAGNOSIS — Z02.83 ENCOUNTER FOR DRUG SCREENING: Primary | ICD-10-CM

## 2020-09-30 PROCEDURE — 80305 DRUG TEST PRSMV DIR OPT OBS: CPT | Mod: S$GLB,,, | Performed by: FAMILY MEDICINE

## 2020-09-30 PROCEDURE — 80305 PR COLLECTION ONLY DRUG SCREEN: ICD-10-PCS | Mod: S$GLB,,, | Performed by: FAMILY MEDICINE

## 2020-10-12 ENCOUNTER — CLINICAL SUPPORT (OUTPATIENT)
Dept: URGENT CARE | Facility: CLINIC | Age: 50
End: 2020-10-12

## 2020-10-12 VITALS — OXYGEN SATURATION: 98 % | TEMPERATURE: 96 F | HEART RATE: 97 BPM

## 2020-10-12 DIAGNOSIS — Z02.83 ENCOUNTER FOR DRUG SCREENING: Primary | ICD-10-CM

## 2020-10-12 PROCEDURE — 80305 PR COLLECTION ONLY DRUG SCREEN: ICD-10-PCS | Mod: S$GLB,,, | Performed by: NURSE PRACTITIONER

## 2020-10-12 PROCEDURE — 80305 DRUG TEST PRSMV DIR OPT OBS: CPT | Mod: S$GLB,,, | Performed by: NURSE PRACTITIONER

## 2020-10-12 NOTE — PROGRESS NOTES
Subjective:       Patient ID: Glo Flores is a 50 y.o. female.    Chief Complaint: [facility] UDS Option 7    [facility] uds option 7      ROS     Objective:      Physical Exam    Assessment:       No diagnosis found.    Plan:                   No follow-ups on file.

## 2020-10-23 ENCOUNTER — CLINICAL SUPPORT (OUTPATIENT)
Dept: URGENT CARE | Facility: CLINIC | Age: 50
End: 2020-10-23

## 2020-10-23 DIAGNOSIS — Z02.83 ENCOUNTER FOR DRUG SCREENING: Primary | ICD-10-CM

## 2020-10-23 PROCEDURE — 80305 DRUG TEST PRSMV DIR OPT OBS: CPT | Mod: S$GLB,,, | Performed by: FAMILY MEDICINE

## 2020-10-23 PROCEDURE — 80305 PR COLLECTION ONLY DRUG SCREEN: ICD-10-PCS | Mod: S$GLB,,, | Performed by: FAMILY MEDICINE

## 2020-10-23 NOTE — PROGRESS NOTES
Subjective:       Patient ID: Glo Flores is a 50 y.o. female.    Vitals:  vitals were not taken for this visit.     Chief Complaint: [facility] Urine Opt 2    Urine drug screen option 2  [employer] called and will  today.    ROS    Objective:      Physical Exam      Assessment:       1. Encounter for drug screening        Plan:         Encounter for drug screening  -     Collection Only Drug Screen

## 2020-11-09 ENCOUNTER — OCCUPATIONAL HEALTH (OUTPATIENT)
Dept: URGENT CARE | Facility: CLINIC | Age: 50
End: 2020-11-09

## 2020-11-09 DIAGNOSIS — Z02.83 ENCOUNTER FOR DRUG SCREENING: Primary | ICD-10-CM

## 2020-11-09 PROCEDURE — 80305 PR COLLECTION ONLY DRUG SCREEN: ICD-10-PCS | Mod: S$GLB,,, | Performed by: FAMILY MEDICINE

## 2020-11-09 PROCEDURE — 80305 DRUG TEST PRSMV DIR OPT OBS: CPT | Mod: S$GLB,,, | Performed by: FAMILY MEDICINE

## 2020-11-09 NOTE — PROGRESS NOTES
Subjective:       Patient ID: Glo Flores is a 50 y.o. female.    Chief Complaint: [facility] UDS Option 4    [facility] UDS option 4 collected by [facility].    ROS     Objective:      Physical Exam    Assessment:       1. Encounter for drug screening        Plan:                   No follow-ups on file.

## 2020-11-17 ENCOUNTER — CLINICAL SUPPORT (OUTPATIENT)
Dept: URGENT CARE | Facility: CLINIC | Age: 50
End: 2020-11-17

## 2020-11-17 DIAGNOSIS — Z02.83 ENCOUNTER FOR DRUG SCREENING: Primary | ICD-10-CM

## 2020-11-17 PROCEDURE — 80305 DRUG TEST PRSMV DIR OPT OBS: CPT | Mod: S$GLB,,, | Performed by: NURSE PRACTITIONER

## 2020-11-17 PROCEDURE — 80305 PR COLLECTION ONLY DRUG SCREEN: ICD-10-PCS | Mod: S$GLB,,, | Performed by: NURSE PRACTITIONER

## 2020-12-02 ENCOUNTER — CLINICAL SUPPORT (OUTPATIENT)
Dept: URGENT CARE | Facility: CLINIC | Age: 50
End: 2020-12-02

## 2020-12-02 DIAGNOSIS — Z02.83 ENCOUNTER FOR DRUG SCREENING: Primary | ICD-10-CM

## 2020-12-02 NOTE — PROGRESS NOTES
Subjective:       Patient ID: Glo Flores is a 50 y.o. female.    Chief Complaint: [facility] UDS Option 4    [facility] UDS Option 4  Will be picked up with other drug screen. praveen BOLAÑOS     Objective:      Physical Exam    Assessment:       No diagnosis found.    Plan:                   No follow-ups on file.

## 2020-12-10 ENCOUNTER — CLINICAL SUPPORT (OUTPATIENT)
Dept: URGENT CARE | Facility: CLINIC | Age: 50
End: 2020-12-10

## 2020-12-10 DIAGNOSIS — Z02.83 ENCOUNTER FOR DRUG SCREENING: Primary | ICD-10-CM

## 2020-12-10 PROCEDURE — 80305 PR COLLECTION ONLY DRUG SCREEN: ICD-10-PCS | Mod: S$GLB,,, | Performed by: NURSE PRACTITIONER

## 2020-12-10 PROCEDURE — 80305 DRUG TEST PRSMV DIR OPT OBS: CPT | Mod: S$GLB,,, | Performed by: NURSE PRACTITIONER

## 2021-01-04 ENCOUNTER — CLINICAL SUPPORT (OUTPATIENT)
Dept: URGENT CARE | Facility: CLINIC | Age: 51
End: 2021-01-04

## 2021-01-04 DIAGNOSIS — Z02.83 ENCOUNTER FOR DRUG SCREENING: Primary | ICD-10-CM

## 2021-01-13 ENCOUNTER — CLINICAL SUPPORT (OUTPATIENT)
Dept: URGENT CARE | Facility: CLINIC | Age: 51
End: 2021-01-13

## 2021-01-13 DIAGNOSIS — Z02.83 ENCOUNTER FOR DRUG SCREENING: Primary | ICD-10-CM

## 2021-01-22 ENCOUNTER — CLINICAL SUPPORT (OUTPATIENT)
Dept: URGENT CARE | Facility: CLINIC | Age: 51
End: 2021-01-22

## 2021-02-26 ENCOUNTER — CLINICAL SUPPORT (OUTPATIENT)
Dept: URGENT CARE | Facility: CLINIC | Age: 51
End: 2021-02-26

## 2021-02-26 DIAGNOSIS — Z02.83 ENCOUNTER FOR DRUG SCREENING: Primary | ICD-10-CM

## 2021-02-26 PROCEDURE — 80305 DRUG TEST PRSMV DIR OPT OBS: CPT | Mod: S$GLB,,, | Performed by: NURSE PRACTITIONER

## 2021-02-26 PROCEDURE — 80305 PR COLLECTION ONLY DRUG SCREEN: ICD-10-PCS | Mod: S$GLB,,, | Performed by: NURSE PRACTITIONER

## 2021-03-15 ENCOUNTER — OCCUPATIONAL HEALTH (OUTPATIENT)
Dept: URGENT CARE | Facility: CLINIC | Age: 51
End: 2021-03-15

## 2021-03-30 ENCOUNTER — CLINICAL SUPPORT (OUTPATIENT)
Dept: URGENT CARE | Facility: CLINIC | Age: 51
End: 2021-03-30

## 2021-03-30 DIAGNOSIS — Z02.83 ENCOUNTER FOR DRUG SCREENING: Primary | ICD-10-CM

## 2021-03-30 PROCEDURE — 80305 DRUG TEST PRSMV DIR OPT OBS: CPT | Mod: S$GLB,,, | Performed by: FAMILY MEDICINE

## 2021-03-30 PROCEDURE — 80305 PR COLLECTION ONLY DRUG SCREEN: ICD-10-PCS | Mod: S$GLB,,, | Performed by: FAMILY MEDICINE

## 2021-04-08 ENCOUNTER — CLINICAL SUPPORT (OUTPATIENT)
Dept: URGENT CARE | Facility: CLINIC | Age: 51
End: 2021-04-08

## 2021-04-08 DIAGNOSIS — Z02.83 ENCOUNTER FOR DRUG SCREENING: Primary | ICD-10-CM

## 2021-04-08 PROCEDURE — 80305 PR COLLECTION ONLY DRUG SCREEN: ICD-10-PCS | Mod: S$GLB,,, | Performed by: SURGERY

## 2021-04-08 PROCEDURE — 80305 DRUG TEST PRSMV DIR OPT OBS: CPT | Mod: S$GLB,,, | Performed by: SURGERY

## 2021-04-16 ENCOUNTER — PATIENT MESSAGE (OUTPATIENT)
Dept: RESEARCH | Facility: HOSPITAL | Age: 51
End: 2021-04-16

## 2021-04-22 ENCOUNTER — CLINICAL SUPPORT (OUTPATIENT)
Dept: URGENT CARE | Facility: CLINIC | Age: 51
End: 2021-04-22

## 2021-04-22 DIAGNOSIS — Z02.83 ENCOUNTER FOR DRUG SCREENING: Primary | ICD-10-CM

## 2021-04-26 ENCOUNTER — CLINICAL SUPPORT (OUTPATIENT)
Dept: URGENT CARE | Facility: CLINIC | Age: 51
End: 2021-04-26

## 2021-04-26 DIAGNOSIS — Z02.83 ENCOUNTER FOR DRUG SCREENING: Primary | ICD-10-CM

## 2021-05-20 ENCOUNTER — CLINICAL SUPPORT (OUTPATIENT)
Dept: URGENT CARE | Facility: CLINIC | Age: 51
End: 2021-05-20

## 2021-05-20 DIAGNOSIS — Z02.83 ENCOUNTER FOR DRUG SCREENING: Primary | ICD-10-CM

## 2021-05-20 PROCEDURE — 80305 DRUG TEST PRSMV DIR OPT OBS: CPT | Mod: S$GLB,,, | Performed by: SURGERY

## 2021-05-20 PROCEDURE — 80305 PR COLLECTION ONLY DRUG SCREEN: ICD-10-PCS | Mod: S$GLB,,, | Performed by: SURGERY

## 2021-06-03 ENCOUNTER — TELEPHONE (OUTPATIENT)
Dept: URGENT CARE | Facility: CLINIC | Age: 51
End: 2021-06-03

## 2021-06-25 ENCOUNTER — CLINICAL SUPPORT (OUTPATIENT)
Dept: URGENT CARE | Facility: CLINIC | Age: 51
End: 2021-06-25

## 2021-06-25 DIAGNOSIS — Z02.83 ENCOUNTER FOR DRUG SCREENING: Primary | ICD-10-CM

## 2021-07-29 ENCOUNTER — CLINICAL SUPPORT (OUTPATIENT)
Dept: URGENT CARE | Facility: CLINIC | Age: 51
End: 2021-07-29

## 2021-07-29 DIAGNOSIS — Z02.83 ENCOUNTER FOR DRUG SCREENING: Primary | ICD-10-CM

## 2021-08-24 ENCOUNTER — CLINICAL SUPPORT (OUTPATIENT)
Dept: URGENT CARE | Facility: CLINIC | Age: 51
End: 2021-08-24

## 2021-08-24 DIAGNOSIS — Z02.83 ENCOUNTER FOR DRUG SCREENING: Primary | ICD-10-CM

## 2021-08-24 PROCEDURE — 80305 DRUG TEST PRSMV DIR OPT OBS: CPT | Mod: S$GLB,,, | Performed by: SURGERY

## 2021-08-24 PROCEDURE — 80305 PR COLLECTION ONLY DRUG SCREEN: ICD-10-PCS | Mod: S$GLB,,, | Performed by: SURGERY

## 2021-08-24 PROCEDURE — 99499 NO LOS: ICD-10-PCS | Mod: S$GLB,,, | Performed by: SURGERY

## 2021-08-24 PROCEDURE — 99499 UNLISTED E&M SERVICE: CPT | Mod: S$GLB,,, | Performed by: SURGERY

## 2021-09-21 ENCOUNTER — OCCUPATIONAL HEALTH (OUTPATIENT)
Dept: URGENT CARE | Facility: CLINIC | Age: 51
End: 2021-09-21

## 2021-09-21 DIAGNOSIS — Z02.83 ENCOUNTER FOR DRUG SCREENING: Primary | ICD-10-CM

## 2021-09-21 PROCEDURE — 80305 DRUG TEST PRSMV DIR OPT OBS: CPT | Mod: S$GLB,,, | Performed by: FAMILY MEDICINE

## 2021-09-21 PROCEDURE — 80305 OOH COLLECTION ONLY DRUG SCREEN: ICD-10-PCS | Mod: S$GLB,,, | Performed by: FAMILY MEDICINE

## 2021-10-04 ENCOUNTER — OCCUPATIONAL HEALTH (OUTPATIENT)
Dept: URGENT CARE | Facility: CLINIC | Age: 51
End: 2021-10-04
Payer: COMMERCIAL

## 2021-10-04 DIAGNOSIS — Z02.83 ENCOUNTER FOR DRUG SCREENING: Primary | ICD-10-CM

## 2021-10-06 ENCOUNTER — OCCUPATIONAL HEALTH (OUTPATIENT)
Dept: URGENT CARE | Facility: CLINIC | Age: 51
End: 2021-10-06
Payer: COMMERCIAL

## 2021-10-06 DIAGNOSIS — Z02.83 ENCOUNTER FOR DRUG SCREENING: Primary | ICD-10-CM

## 2021-11-09 ENCOUNTER — CLINICAL SUPPORT (OUTPATIENT)
Dept: URGENT CARE | Facility: CLINIC | Age: 51
End: 2021-11-09
Payer: COMMERCIAL

## 2021-11-09 DIAGNOSIS — Z02.83 ENCOUNTER FOR DRUG SCREENING: Primary | ICD-10-CM

## 2022-02-15 ENCOUNTER — CLINICAL SUPPORT (OUTPATIENT)
Dept: URGENT CARE | Facility: CLINIC | Age: 52
End: 2022-02-15

## 2022-02-15 DIAGNOSIS — Z02.83 ENCOUNTER FOR DRUG SCREENING: Primary | ICD-10-CM

## 2022-02-15 PROCEDURE — 80305 OOH COLLECTION ONLY DRUG SCREEN: ICD-10-PCS | Mod: S$GLB,,, | Performed by: FAMILY MEDICINE

## 2022-02-15 PROCEDURE — 80305 DRUG TEST PRSMV DIR OPT OBS: CPT | Mod: S$GLB,,, | Performed by: FAMILY MEDICINE

## 2022-03-25 ENCOUNTER — CLINICAL SUPPORT (OUTPATIENT)
Dept: URGENT CARE | Facility: CLINIC | Age: 52
End: 2022-03-25

## 2022-03-25 DIAGNOSIS — Z02.83 ENCOUNTER FOR DRUG SCREENING: Primary | ICD-10-CM

## 2022-04-06 ENCOUNTER — CLINICAL SUPPORT (OUTPATIENT)
Dept: URGENT CARE | Facility: CLINIC | Age: 52
End: 2022-04-06

## 2022-04-06 DIAGNOSIS — Z02.83 ENCOUNTER FOR DRUG SCREENING: Primary | ICD-10-CM

## 2022-04-06 PROCEDURE — 80305 DRUG TEST PRSMV DIR OPT OBS: CPT | Mod: S$GLB,,, | Performed by: PREVENTIVE MEDICINE

## 2022-04-06 PROCEDURE — 80305 PR COLLECTION ONLY DRUG SCREEN: ICD-10-PCS | Mod: S$GLB,,, | Performed by: PREVENTIVE MEDICINE

## 2022-04-12 ENCOUNTER — CLINICAL SUPPORT (OUTPATIENT)
Dept: URGENT CARE | Facility: CLINIC | Age: 52
End: 2022-04-12
Payer: COMMERCIAL

## 2022-04-12 DIAGNOSIS — Z02.83 ENCOUNTER FOR DRUG SCREENING: Primary | ICD-10-CM

## 2022-04-12 PROCEDURE — 80305 DRUG TEST PRSMV DIR OPT OBS: CPT | Mod: S$GLB,,, | Performed by: PHYSICIAN ASSISTANT

## 2022-04-12 PROCEDURE — 80305 PR COLLECTION ONLY DRUG SCREEN: ICD-10-PCS | Mod: S$GLB,,, | Performed by: PHYSICIAN ASSISTANT

## 2022-05-23 ENCOUNTER — OCCUPATIONAL HEALTH (OUTPATIENT)
Dept: URGENT CARE | Facility: CLINIC | Age: 52
End: 2022-05-23

## 2022-05-23 DIAGNOSIS — Z02.83 ENCOUNTER FOR DRUG SCREENING: Primary | ICD-10-CM

## 2022-05-23 PROCEDURE — 80305 PR COLLECTION ONLY DRUG SCREEN: ICD-10-PCS | Mod: S$GLB,,, | Performed by: PHYSICIAN ASSISTANT

## 2022-05-23 PROCEDURE — 80305 DRUG TEST PRSMV DIR OPT OBS: CPT | Mod: S$GLB,,, | Performed by: PHYSICIAN ASSISTANT

## 2022-06-14 ENCOUNTER — OCCUPATIONAL HEALTH (OUTPATIENT)
Dept: URGENT CARE | Facility: CLINIC | Age: 52
End: 2022-06-14

## 2022-06-14 DIAGNOSIS — Z02.83 ENCOUNTER FOR DRUG SCREENING: Primary | ICD-10-CM

## 2022-06-14 PROCEDURE — 80305 DRUG TEST PRSMV DIR OPT OBS: CPT | Mod: S$GLB,,, | Performed by: PREVENTIVE MEDICINE

## 2022-06-14 PROCEDURE — 80305 PR COLLECTION ONLY DRUG SCREEN: ICD-10-PCS | Mod: S$GLB,,, | Performed by: PREVENTIVE MEDICINE

## 2022-07-07 ENCOUNTER — OCCUPATIONAL HEALTH (OUTPATIENT)
Dept: URGENT CARE | Facility: CLINIC | Age: 52
End: 2022-07-07

## 2022-07-07 DIAGNOSIS — Z02.83 ENCOUNTER FOR DRUG SCREENING: Primary | ICD-10-CM

## 2022-07-07 PROCEDURE — 80305 DRUG TEST PRSMV DIR OPT OBS: CPT | Mod: S$GLB,,, | Performed by: PHYSICIAN ASSISTANT

## 2022-07-07 PROCEDURE — 80305 OOH COLLECTION ONLY DRUG SCREEN: ICD-10-PCS | Mod: S$GLB,,, | Performed by: PHYSICIAN ASSISTANT

## 2022-08-19 ENCOUNTER — OCCUPATIONAL HEALTH (OUTPATIENT)
Dept: URGENT CARE | Facility: CLINIC | Age: 52
End: 2022-08-19

## 2022-08-19 DIAGNOSIS — Z02.83 ENCOUNTER FOR DRUG SCREENING: Primary | ICD-10-CM

## 2022-08-19 PROCEDURE — 80305 PR COLLECTION ONLY DRUG SCREEN: ICD-10-PCS | Mod: S$GLB,,,

## 2022-08-19 PROCEDURE — 80305 DRUG TEST PRSMV DIR OPT OBS: CPT | Mod: S$GLB,,,

## 2022-09-16 ENCOUNTER — OCCUPATIONAL HEALTH (OUTPATIENT)
Dept: URGENT CARE | Facility: CLINIC | Age: 52
End: 2022-09-16

## 2022-09-16 DIAGNOSIS — Z02.83 ENCOUNTER FOR DRUG SCREENING: Primary | ICD-10-CM

## 2022-09-16 PROCEDURE — 80305 DRUG TEST PRSMV DIR OPT OBS: CPT | Mod: S$GLB,,, | Performed by: NURSE PRACTITIONER

## 2022-09-16 PROCEDURE — 80305 OOH COLLECTION ONLY DRUG SCREEN: ICD-10-PCS | Mod: S$GLB,,, | Performed by: NURSE PRACTITIONER

## 2022-09-26 ENCOUNTER — OCCUPATIONAL HEALTH (OUTPATIENT)
Dept: URGENT CARE | Facility: CLINIC | Age: 52
End: 2022-09-26

## 2022-09-26 DIAGNOSIS — Z02.83 ENCOUNTER FOR DRUG SCREENING: Primary | ICD-10-CM

## 2022-09-26 PROCEDURE — 80305 DRUG TEST PRSMV DIR OPT OBS: CPT | Mod: S$GLB,,, | Performed by: FAMILY MEDICINE

## 2022-09-26 PROCEDURE — 80305 OOH DOT DRUG SCREEN: ICD-10-PCS | Mod: S$GLB,,, | Performed by: FAMILY MEDICINE

## 2022-11-03 ENCOUNTER — OCCUPATIONAL HEALTH (OUTPATIENT)
Dept: URGENT CARE | Facility: CLINIC | Age: 52
End: 2022-11-03

## 2022-11-03 DIAGNOSIS — Z02.83 ENCOUNTER FOR DRUG SCREENING: Primary | ICD-10-CM

## 2022-11-03 PROCEDURE — 80305 OOH COLLECTION ONLY DRUG SCREEN: ICD-10-PCS | Mod: S$GLB,,, | Performed by: NURSE PRACTITIONER

## 2022-11-03 PROCEDURE — 80305 DRUG TEST PRSMV DIR OPT OBS: CPT | Mod: S$GLB,,, | Performed by: NURSE PRACTITIONER

## 2022-11-03 NOTE — PROGRESS NOTES
Subjective:       Patient ID: Glo Flores is a 52 y.o. female.    Vitals:  vitals were not taken for this visit.     Chief Complaint: drug test     Patient here for drug screening   ROS    Objective:      Physical Exam      Assessment:       1. Encounter for drug screening            Plan:         Encounter for drug screening  -     [facility] Collection Only Drug Screen

## 2022-12-02 ENCOUNTER — OCCUPATIONAL HEALTH (OUTPATIENT)
Dept: URGENT CARE | Facility: CLINIC | Age: 52
End: 2022-12-02

## 2022-12-02 DIAGNOSIS — Z02.83 ENCOUNTER FOR DRUG SCREENING: Primary | ICD-10-CM

## 2022-12-02 PROCEDURE — 80305 DRUG TEST PRSMV DIR OPT OBS: CPT | Mod: S$GLB,,, | Performed by: NURSE PRACTITIONER

## 2022-12-02 PROCEDURE — 80305 OOH DOT DRUG SCREEN: ICD-10-PCS | Mod: S$GLB,,, | Performed by: NURSE PRACTITIONER

## 2023-01-03 ENCOUNTER — OCCUPATIONAL HEALTH (OUTPATIENT)
Dept: URGENT CARE | Facility: CLINIC | Age: 53
End: 2023-01-03

## 2023-01-03 PROCEDURE — 80305 DRUG TEST PRSMV DIR OPT OBS: CPT | Mod: S$GLB,,, | Performed by: NURSE PRACTITIONER

## 2023-01-03 PROCEDURE — 80305 PR COLLECTION ONLY DRUG SCREEN: ICD-10-PCS | Mod: S$GLB,,, | Performed by: NURSE PRACTITIONER

## 2023-02-24 ENCOUNTER — OCCUPATIONAL HEALTH (OUTPATIENT)
Dept: URGENT CARE | Facility: CLINIC | Age: 53
End: 2023-02-24

## 2023-02-24 DIAGNOSIS — Z56.89 WORK-RELATED CONDITION: Primary | ICD-10-CM

## 2023-02-24 DIAGNOSIS — Z02.83 ENCOUNTER FOR DRUG SCREENING: ICD-10-CM

## 2023-02-24 PROCEDURE — 80305 DRUG TEST PRSMV DIR OPT OBS: CPT | Mod: S$GLB,,, | Performed by: SURGERY

## 2023-02-24 PROCEDURE — 80305 OOH NON-DOT DRUG SCREEN: ICD-10-PCS | Mod: S$GLB,,, | Performed by: SURGERY

## 2023-03-23 ENCOUNTER — OCCUPATIONAL HEALTH (OUTPATIENT)
Dept: URGENT CARE | Facility: CLINIC | Age: 53
End: 2023-03-23

## 2023-03-23 DIAGNOSIS — Z02.83 ENCOUNTER FOR DRUG SCREENING: Primary | ICD-10-CM

## 2023-03-23 PROCEDURE — 80305 DRUG TEST PRSMV DIR OPT OBS: CPT | Mod: S$GLB,,, | Performed by: NURSE PRACTITIONER

## 2023-03-23 PROCEDURE — 80305 OOH COLLECTION ONLY DRUG SCREEN: ICD-10-PCS | Mod: S$GLB,,, | Performed by: NURSE PRACTITIONER

## 2023-03-28 ENCOUNTER — OCCUPATIONAL HEALTH (OUTPATIENT)
Dept: URGENT CARE | Facility: CLINIC | Age: 53
End: 2023-03-28

## 2023-03-28 DIAGNOSIS — Z02.83 ENCOUNTER FOR DRUG SCREENING: Primary | ICD-10-CM

## 2023-03-28 PROCEDURE — 80305 OOH COLLECTION ONLY DRUG SCREEN: ICD-10-PCS | Mod: S$GLB,,, | Performed by: NURSE PRACTITIONER

## 2023-03-28 PROCEDURE — 80305 DRUG TEST PRSMV DIR OPT OBS: CPT | Mod: S$GLB,,, | Performed by: NURSE PRACTITIONER

## 2023-04-05 ENCOUNTER — OCCUPATIONAL HEALTH (OUTPATIENT)
Dept: URGENT CARE | Facility: CLINIC | Age: 53
End: 2023-04-05

## 2023-04-05 DIAGNOSIS — Z02.83 ENCOUNTER FOR DRUG SCREENING: Primary | ICD-10-CM

## 2023-04-05 PROCEDURE — 80305 PR COLLECTION ONLY DRUG SCREEN: ICD-10-PCS | Mod: S$GLB,,, | Performed by: INTERNAL MEDICINE

## 2023-04-05 PROCEDURE — 80305 DRUG TEST PRSMV DIR OPT OBS: CPT | Mod: S$GLB,,, | Performed by: INTERNAL MEDICINE

## 2023-05-23 ENCOUNTER — OCCUPATIONAL HEALTH (OUTPATIENT)
Dept: URGENT CARE | Facility: CLINIC | Age: 53
End: 2023-05-23

## 2023-05-23 DIAGNOSIS — Z02.83 ENCOUNTER FOR DRUG SCREENING: Primary | ICD-10-CM

## 2023-05-23 PROCEDURE — 80305 DRUG TEST PRSMV DIR OPT OBS: CPT | Mod: S$GLB,,, | Performed by: FAMILY MEDICINE

## 2023-05-23 PROCEDURE — 80305 OOH COLLECTION ONLY DRUG SCREEN: ICD-10-PCS | Mod: S$GLB,,, | Performed by: FAMILY MEDICINE

## 2023-06-05 ENCOUNTER — OCCUPATIONAL HEALTH (OUTPATIENT)
Dept: URGENT CARE | Facility: CLINIC | Age: 53
End: 2023-06-05

## 2023-06-05 DIAGNOSIS — Z02.83 ENCOUNTER FOR DRUG SCREENING: Primary | ICD-10-CM

## 2023-06-05 PROCEDURE — 80305 DRUG TEST PRSMV DIR OPT OBS: CPT | Mod: S$GLB,,, | Performed by: SURGERY

## 2023-06-05 PROCEDURE — 80305 OOH COLLECTION ONLY DRUG SCREEN: ICD-10-PCS | Mod: S$GLB,,, | Performed by: SURGERY

## 2023-06-21 ENCOUNTER — OCCUPATIONAL HEALTH (OUTPATIENT)
Dept: URGENT CARE | Facility: CLINIC | Age: 53
End: 2023-06-21

## 2023-06-21 DIAGNOSIS — Z13.9 ENCOUNTER FOR SCREENING: Primary | ICD-10-CM

## 2023-06-21 PROCEDURE — 80305 OOH COLLECTION ONLY DRUG SCREEN: ICD-10-PCS | Mod: S$GLB,,, | Performed by: PHYSICIAN ASSISTANT

## 2023-06-21 PROCEDURE — 80305 DRUG TEST PRSMV DIR OPT OBS: CPT | Mod: S$GLB,,, | Performed by: PHYSICIAN ASSISTANT

## 2023-07-28 ENCOUNTER — OCCUPATIONAL HEALTH (OUTPATIENT)
Dept: URGENT CARE | Facility: CLINIC | Age: 53
End: 2023-07-28

## 2023-07-28 DIAGNOSIS — Z02.83 ENCOUNTER FOR DRUG SCREENING: Primary | ICD-10-CM

## 2023-07-28 PROCEDURE — 80305 OOH COLLECTION ONLY DRUG SCREEN: ICD-10-PCS | Mod: S$GLB,,, | Performed by: SURGERY

## 2023-07-28 PROCEDURE — 80305 DRUG TEST PRSMV DIR OPT OBS: CPT | Mod: S$GLB,,, | Performed by: SURGERY

## 2023-08-25 ENCOUNTER — OCCUPATIONAL HEALTH (OUTPATIENT)
Dept: URGENT CARE | Facility: CLINIC | Age: 53
End: 2023-08-25

## 2023-08-25 DIAGNOSIS — Z02.83 ENCOUNTER FOR DRUG SCREENING: Primary | ICD-10-CM

## 2023-08-25 PROCEDURE — 80305 OOH COLLECTION ONLY DRUG SCREEN: ICD-10-PCS | Mod: S$GLB,,, | Performed by: FAMILY MEDICINE

## 2023-08-25 PROCEDURE — 80305 DRUG TEST PRSMV DIR OPT OBS: CPT | Mod: S$GLB,,, | Performed by: FAMILY MEDICINE

## 2023-09-18 ENCOUNTER — OCCUPATIONAL HEALTH (OUTPATIENT)
Dept: URGENT CARE | Facility: CLINIC | Age: 53
End: 2023-09-18

## 2023-09-18 DIAGNOSIS — Z02.83 ENCOUNTER FOR DRUG SCREENING: Primary | ICD-10-CM

## 2023-10-24 ENCOUNTER — OCCUPATIONAL HEALTH (OUTPATIENT)
Dept: URGENT CARE | Facility: CLINIC | Age: 53
End: 2023-10-24

## 2023-10-24 DIAGNOSIS — Z02.83 ENCOUNTER FOR DRUG SCREENING: Primary | ICD-10-CM

## 2023-10-24 PROCEDURE — 80305 DRUG TEST PRSMV DIR OPT OBS: CPT | Mod: S$GLB,,, | Performed by: NURSE PRACTITIONER

## 2023-10-24 PROCEDURE — 80305 OOH COLLECTION ONLY DRUG SCREEN: ICD-10-PCS | Mod: S$GLB,,, | Performed by: NURSE PRACTITIONER

## 2023-12-18 ENCOUNTER — OCCUPATIONAL HEALTH (OUTPATIENT)
Dept: URGENT CARE | Facility: CLINIC | Age: 53
End: 2023-12-18

## 2023-12-18 DIAGNOSIS — Z02.83 ENCOUNTER FOR DRUG SCREENING: Primary | ICD-10-CM

## 2023-12-18 PROCEDURE — 80305 OOH COLLECTION ONLY DRUG SCREEN: ICD-10-PCS | Mod: S$GLB,,, | Performed by: FAMILY MEDICINE

## 2023-12-18 PROCEDURE — 80305 DRUG TEST PRSMV DIR OPT OBS: CPT | Mod: S$GLB,,, | Performed by: FAMILY MEDICINE

## 2023-12-29 ENCOUNTER — OCCUPATIONAL HEALTH (OUTPATIENT)
Dept: URGENT CARE | Facility: CLINIC | Age: 53
End: 2023-12-29

## 2023-12-29 DIAGNOSIS — Z02.83 ENCOUNTER FOR DRUG SCREENING: Primary | ICD-10-CM

## 2023-12-29 NOTE — PROGRESS NOTES
Subjective:      Patient ID: Glo Flores is a 53 y.o. female.    Vitals:  vitals were not taken for this visit.     Chief Complaint: No chief complaint on file.    [facility] drug screen    ROS   Objective:     Physical Exam    Assessment:     1. Encounter for drug screening        Plan:       Encounter for drug screening  -     ABO/Rh